# Patient Record
Sex: FEMALE | Race: WHITE | NOT HISPANIC OR LATINO | Employment: OTHER | URBAN - METROPOLITAN AREA
[De-identification: names, ages, dates, MRNs, and addresses within clinical notes are randomized per-mention and may not be internally consistent; named-entity substitution may affect disease eponyms.]

---

## 2017-01-03 ENCOUNTER — APPOINTMENT (OUTPATIENT)
Dept: PHYSICAL THERAPY | Facility: CLINIC | Age: 82
End: 2017-01-03
Payer: COMMERCIAL

## 2017-01-03 PROCEDURE — 97110 THERAPEUTIC EXERCISES: CPT

## 2017-01-03 PROCEDURE — 97112 NEUROMUSCULAR REEDUCATION: CPT

## 2017-01-04 ENCOUNTER — APPOINTMENT (EMERGENCY)
Dept: CT IMAGING | Facility: HOSPITAL | Age: 82
End: 2017-01-04
Payer: COMMERCIAL

## 2017-01-04 ENCOUNTER — HOSPITAL ENCOUNTER (EMERGENCY)
Facility: HOSPITAL | Age: 82
Discharge: HOME | End: 2017-01-04
Attending: EMERGENCY MEDICINE
Payer: COMMERCIAL

## 2017-01-04 ENCOUNTER — APPOINTMENT (EMERGENCY)
Dept: RADIOLOGY | Facility: HOSPITAL | Age: 82
End: 2017-01-04
Payer: COMMERCIAL

## 2017-01-04 ENCOUNTER — APPOINTMENT (OUTPATIENT)
Dept: PHYSICAL THERAPY | Facility: CLINIC | Age: 82
End: 2017-01-04
Payer: COMMERCIAL

## 2017-01-04 VITALS
TEMPERATURE: 98.4 F | OXYGEN SATURATION: 95 % | RESPIRATION RATE: 18 BRPM | DIASTOLIC BLOOD PRESSURE: 74 MMHG | SYSTOLIC BLOOD PRESSURE: 141 MMHG | HEART RATE: 59 BPM

## 2017-01-04 DIAGNOSIS — S50.01XA CONTUSION OF RIGHT ELBOW, INITIAL ENCOUNTER: ICD-10-CM
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
## 2017-01-04 DIAGNOSIS — S81.811A NONINFECTED SKIN TEAR OF LOWER EXTREMITY, RIGHT, INITIAL ENCOUNTER: ICD-10-CM

## 2017-01-04 DIAGNOSIS — S00.33XA CONTUSION OF NOSE, INITIAL ENCOUNTER: ICD-10-CM

## 2017-01-04 PROCEDURE — 73564 X-RAY EXAM KNEE 4 OR MORE: CPT

## 2017-01-04 PROCEDURE — 90715 TDAP VACCINE 7 YRS/> IM: CPT | Performed by: PHYSICIAN ASSISTANT

## 2017-01-04 PROCEDURE — 70160 X-RAY EXAM OF NASAL BONES: CPT

## 2017-01-04 PROCEDURE — 70450 CT HEAD/BRAIN W/O DYE: CPT

## 2017-01-04 PROCEDURE — 99284 EMERGENCY DEPT VISIT MOD MDM: CPT

## 2017-01-04 PROCEDURE — 73080 X-RAY EXAM OF ELBOW: CPT

## 2017-01-04 PROCEDURE — 90471 IMMUNIZATION ADMIN: CPT

## 2017-01-04 RX ADMIN — TETANUS TOXOID, REDUCED DIPHTHERIA TOXOID AND ACELLULAR PERTUSSIS VACCINE, ADSORBED 0.5 ML: 5; 2.5; 8; 8; 2.5 SUSPENSION INTRAMUSCULAR at 16:08

## 2017-01-06 ENCOUNTER — APPOINTMENT (OUTPATIENT)
Dept: PHYSICAL THERAPY | Facility: CLINIC | Age: 82
End: 2017-01-06
Payer: COMMERCIAL

## 2017-01-09 ENCOUNTER — OFFICE VISIT (OUTPATIENT)
Dept: URGENT CARE | Facility: MEDICAL CENTER | Age: 82
End: 2017-01-09
Payer: COMMERCIAL

## 2017-01-09 PROCEDURE — G0463 HOSPITAL OUTPT CLINIC VISIT: HCPCS

## 2017-01-09 PROCEDURE — 99213 OFFICE O/P EST LOW 20 MIN: CPT

## 2017-01-10 ENCOUNTER — APPOINTMENT (OUTPATIENT)
Dept: PHYSICAL THERAPY | Facility: CLINIC | Age: 82
End: 2017-01-10
Payer: COMMERCIAL

## 2017-01-10 ENCOUNTER — LAB REQUISITION (OUTPATIENT)
Dept: LAB | Facility: HOSPITAL | Age: 82
End: 2017-01-10
Payer: COMMERCIAL

## 2017-01-10 DIAGNOSIS — L03.90 CELLULITIS: ICD-10-CM

## 2017-01-10 PROCEDURE — 87147 CULTURE TYPE IMMUNOLOGIC: CPT | Performed by: EMERGENCY MEDICINE

## 2017-01-10 PROCEDURE — 87205 SMEAR GRAM STAIN: CPT | Performed by: EMERGENCY MEDICINE

## 2017-01-10 PROCEDURE — 87186 SC STD MICRODIL/AGAR DIL: CPT | Performed by: EMERGENCY MEDICINE

## 2017-01-10 PROCEDURE — 87070 CULTURE OTHR SPECIMN AEROBIC: CPT | Performed by: EMERGENCY MEDICINE

## 2017-01-11 ENCOUNTER — APPOINTMENT (OUTPATIENT)
Dept: PHYSICAL THERAPY | Facility: CLINIC | Age: 82
End: 2017-01-11
Payer: COMMERCIAL

## 2017-01-13 ENCOUNTER — APPOINTMENT (OUTPATIENT)
Dept: PHYSICAL THERAPY | Facility: CLINIC | Age: 82
End: 2017-01-13
Payer: COMMERCIAL

## 2017-01-13 LAB
BACTERIA WND AEROBE CULT: NORMAL
BACTERIA WND AEROBE CULT: NORMAL
GRAM STN SPEC: NORMAL
GRAM STN SPEC: NORMAL

## 2017-01-17 ENCOUNTER — APPOINTMENT (OUTPATIENT)
Dept: PHYSICAL THERAPY | Facility: CLINIC | Age: 82
End: 2017-01-17
Payer: COMMERCIAL

## 2017-01-17 PROCEDURE — G8990 OTHER PT/OT CURRENT STATUS: HCPCS

## 2017-01-17 PROCEDURE — G8991 OTHER PT/OT GOAL STATUS: HCPCS

## 2017-01-17 PROCEDURE — 97164 PT RE-EVAL EST PLAN CARE: CPT

## 2017-01-18 ENCOUNTER — APPOINTMENT (OUTPATIENT)
Dept: PHYSICAL THERAPY | Facility: CLINIC | Age: 82
End: 2017-01-18
Payer: COMMERCIAL

## 2017-01-18 PROCEDURE — 97110 THERAPEUTIC EXERCISES: CPT

## 2017-01-18 PROCEDURE — 97112 NEUROMUSCULAR REEDUCATION: CPT

## 2017-01-20 ENCOUNTER — APPOINTMENT (OUTPATIENT)
Dept: PHYSICAL THERAPY | Facility: CLINIC | Age: 82
End: 2017-01-20
Payer: COMMERCIAL

## 2017-01-20 PROCEDURE — 97110 THERAPEUTIC EXERCISES: CPT

## 2017-01-20 PROCEDURE — 97112 NEUROMUSCULAR REEDUCATION: CPT

## 2017-01-21 ENCOUNTER — HOSPITAL ENCOUNTER (EMERGENCY)
Facility: HOSPITAL | Age: 82
Discharge: HOME | End: 2017-01-21
Attending: EMERGENCY MEDICINE | Admitting: EMERGENCY MEDICINE
Payer: COMMERCIAL

## 2017-01-21 ENCOUNTER — APPOINTMENT (EMERGENCY)
Dept: RADIOLOGY | Facility: HOSPITAL | Age: 82
End: 2017-01-21
Payer: COMMERCIAL

## 2017-01-21 VITALS
SYSTOLIC BLOOD PRESSURE: 149 MMHG | HEART RATE: 74 BPM | WEIGHT: 134.26 LBS | BODY MASS INDEX: 29.05 KG/M2 | RESPIRATION RATE: 18 BRPM | TEMPERATURE: 98.8 F | OXYGEN SATURATION: 95 % | DIASTOLIC BLOOD PRESSURE: 82 MMHG

## 2017-01-21 DIAGNOSIS — S81.801A OPEN WOUND OF RIGHT LOWER LEG, INITIAL ENCOUNTER: Primary | ICD-10-CM

## 2017-01-21 PROCEDURE — 99283 EMERGENCY DEPT VISIT LOW MDM: CPT

## 2017-01-21 PROCEDURE — 73590 X-RAY EXAM OF LOWER LEG: CPT

## 2017-01-24 ENCOUNTER — APPOINTMENT (OUTPATIENT)
Dept: PHYSICAL THERAPY | Facility: CLINIC | Age: 82
End: 2017-01-24
Payer: COMMERCIAL

## 2017-01-24 PROCEDURE — 97112 NEUROMUSCULAR REEDUCATION: CPT

## 2017-01-24 PROCEDURE — 97110 THERAPEUTIC EXERCISES: CPT

## 2017-01-25 ENCOUNTER — APPOINTMENT (OUTPATIENT)
Dept: PHYSICAL THERAPY | Facility: CLINIC | Age: 82
End: 2017-01-25
Payer: COMMERCIAL

## 2017-01-25 PROCEDURE — 97112 NEUROMUSCULAR REEDUCATION: CPT

## 2017-01-25 PROCEDURE — 97110 THERAPEUTIC EXERCISES: CPT

## 2017-01-27 ENCOUNTER — APPOINTMENT (OUTPATIENT)
Dept: PHYSICAL THERAPY | Facility: CLINIC | Age: 82
End: 2017-01-27
Payer: COMMERCIAL

## 2017-01-27 PROCEDURE — 97110 THERAPEUTIC EXERCISES: CPT

## 2017-01-27 PROCEDURE — 97140 MANUAL THERAPY 1/> REGIONS: CPT

## 2017-01-27 PROCEDURE — 97112 NEUROMUSCULAR REEDUCATION: CPT

## 2017-01-31 ENCOUNTER — APPOINTMENT (OUTPATIENT)
Dept: PHYSICAL THERAPY | Facility: CLINIC | Age: 82
End: 2017-01-31
Payer: COMMERCIAL

## 2017-01-31 PROCEDURE — 97112 NEUROMUSCULAR REEDUCATION: CPT

## 2017-02-01 ENCOUNTER — APPOINTMENT (OUTPATIENT)
Dept: PHYSICAL THERAPY | Facility: CLINIC | Age: 82
End: 2017-02-01
Payer: COMMERCIAL

## 2017-02-01 PROCEDURE — 97110 THERAPEUTIC EXERCISES: CPT

## 2017-02-01 PROCEDURE — 97112 NEUROMUSCULAR REEDUCATION: CPT

## 2017-02-03 ENCOUNTER — APPOINTMENT (OUTPATIENT)
Dept: PHYSICAL THERAPY | Facility: CLINIC | Age: 82
End: 2017-02-03
Payer: COMMERCIAL

## 2017-02-07 ENCOUNTER — APPOINTMENT (OUTPATIENT)
Dept: PHYSICAL THERAPY | Facility: CLINIC | Age: 82
End: 2017-02-07
Payer: COMMERCIAL

## 2017-02-07 PROCEDURE — 97110 THERAPEUTIC EXERCISES: CPT

## 2017-02-07 PROCEDURE — 97112 NEUROMUSCULAR REEDUCATION: CPT

## 2017-02-07 PROCEDURE — 97140 MANUAL THERAPY 1/> REGIONS: CPT

## 2017-02-08 ENCOUNTER — APPOINTMENT (OUTPATIENT)
Dept: PHYSICAL THERAPY | Facility: CLINIC | Age: 82
End: 2017-02-08
Payer: COMMERCIAL

## 2017-02-08 PROCEDURE — 97140 MANUAL THERAPY 1/> REGIONS: CPT

## 2017-02-08 PROCEDURE — 97110 THERAPEUTIC EXERCISES: CPT

## 2017-02-08 PROCEDURE — 97112 NEUROMUSCULAR REEDUCATION: CPT

## 2017-02-10 ENCOUNTER — APPOINTMENT (OUTPATIENT)
Dept: PHYSICAL THERAPY | Facility: CLINIC | Age: 82
End: 2017-02-10
Payer: COMMERCIAL

## 2017-02-10 PROCEDURE — 97112 NEUROMUSCULAR REEDUCATION: CPT

## 2017-02-10 PROCEDURE — 97110 THERAPEUTIC EXERCISES: CPT

## 2017-02-14 ENCOUNTER — APPOINTMENT (OUTPATIENT)
Dept: PHYSICAL THERAPY | Facility: CLINIC | Age: 82
End: 2017-02-14
Payer: COMMERCIAL

## 2017-02-14 PROCEDURE — 97112 NEUROMUSCULAR REEDUCATION: CPT

## 2017-02-14 PROCEDURE — 97140 MANUAL THERAPY 1/> REGIONS: CPT

## 2017-02-14 PROCEDURE — 97110 THERAPEUTIC EXERCISES: CPT

## 2017-02-15 ENCOUNTER — APPOINTMENT (OUTPATIENT)
Dept: PHYSICAL THERAPY | Facility: CLINIC | Age: 82
End: 2017-02-15
Payer: COMMERCIAL

## 2017-02-17 ENCOUNTER — APPOINTMENT (OUTPATIENT)
Dept: PHYSICAL THERAPY | Facility: CLINIC | Age: 82
End: 2017-02-17
Payer: COMMERCIAL

## 2017-02-17 PROCEDURE — 97140 MANUAL THERAPY 1/> REGIONS: CPT

## 2017-02-17 PROCEDURE — 97014 ELECTRIC STIMULATION THERAPY: CPT

## 2017-02-17 PROCEDURE — 97110 THERAPEUTIC EXERCISES: CPT

## 2017-02-17 PROCEDURE — 97112 NEUROMUSCULAR REEDUCATION: CPT

## 2017-02-21 ENCOUNTER — APPOINTMENT (OUTPATIENT)
Dept: PHYSICAL THERAPY | Facility: CLINIC | Age: 82
End: 2017-02-21
Payer: COMMERCIAL

## 2017-02-21 PROCEDURE — 97112 NEUROMUSCULAR REEDUCATION: CPT

## 2017-02-21 PROCEDURE — 97110 THERAPEUTIC EXERCISES: CPT

## 2017-02-21 PROCEDURE — 97140 MANUAL THERAPY 1/> REGIONS: CPT

## 2017-02-21 PROCEDURE — 97014 ELECTRIC STIMULATION THERAPY: CPT

## 2017-02-22 ENCOUNTER — APPOINTMENT (OUTPATIENT)
Dept: PHYSICAL THERAPY | Facility: CLINIC | Age: 82
End: 2017-02-22
Payer: COMMERCIAL

## 2017-02-22 PROCEDURE — 97140 MANUAL THERAPY 1/> REGIONS: CPT

## 2017-02-22 PROCEDURE — 97110 THERAPEUTIC EXERCISES: CPT

## 2017-02-22 PROCEDURE — 97112 NEUROMUSCULAR REEDUCATION: CPT

## 2017-02-24 ENCOUNTER — APPOINTMENT (OUTPATIENT)
Dept: PHYSICAL THERAPY | Facility: CLINIC | Age: 82
End: 2017-02-24
Payer: COMMERCIAL

## 2017-02-28 ENCOUNTER — APPOINTMENT (OUTPATIENT)
Dept: PHYSICAL THERAPY | Facility: CLINIC | Age: 82
End: 2017-02-28
Payer: COMMERCIAL

## 2017-02-28 ENCOUNTER — APPOINTMENT (OUTPATIENT)
Dept: RADIATION ONCOLOGY | Facility: HOSPITAL | Age: 82
End: 2017-02-28
Attending: RADIOLOGY
Payer: COMMERCIAL

## 2017-02-28 ENCOUNTER — GENERIC CONVERSION - ENCOUNTER (OUTPATIENT)
Dept: OTHER | Facility: OTHER | Age: 82
End: 2017-02-28

## 2017-02-28 PROCEDURE — 99213 OFFICE O/P EST LOW 20 MIN: CPT | Performed by: RADIOLOGY

## 2017-03-01 ENCOUNTER — APPOINTMENT (OUTPATIENT)
Dept: PHYSICAL THERAPY | Facility: CLINIC | Age: 82
End: 2017-03-01
Payer: COMMERCIAL

## 2017-03-01 PROCEDURE — 97110 THERAPEUTIC EXERCISES: CPT

## 2017-03-01 PROCEDURE — 97140 MANUAL THERAPY 1/> REGIONS: CPT

## 2017-03-03 ENCOUNTER — APPOINTMENT (OUTPATIENT)
Dept: PHYSICAL THERAPY | Facility: CLINIC | Age: 82
End: 2017-03-03
Payer: COMMERCIAL

## 2017-03-07 ENCOUNTER — APPOINTMENT (OUTPATIENT)
Dept: PHYSICAL THERAPY | Facility: CLINIC | Age: 82
End: 2017-03-07
Payer: COMMERCIAL

## 2017-03-07 PROCEDURE — G8991 OTHER PT/OT GOAL STATUS: HCPCS

## 2017-03-07 PROCEDURE — 97112 NEUROMUSCULAR REEDUCATION: CPT

## 2017-03-07 PROCEDURE — 97110 THERAPEUTIC EXERCISES: CPT

## 2017-03-07 PROCEDURE — G8990 OTHER PT/OT CURRENT STATUS: HCPCS

## 2017-03-08 ENCOUNTER — APPOINTMENT (OUTPATIENT)
Dept: PHYSICAL THERAPY | Facility: CLINIC | Age: 82
End: 2017-03-08
Payer: COMMERCIAL

## 2017-03-08 PROCEDURE — 97110 THERAPEUTIC EXERCISES: CPT

## 2017-03-08 PROCEDURE — 97112 NEUROMUSCULAR REEDUCATION: CPT

## 2017-03-10 ENCOUNTER — APPOINTMENT (OUTPATIENT)
Dept: PHYSICAL THERAPY | Facility: CLINIC | Age: 82
End: 2017-03-10
Payer: COMMERCIAL

## 2017-03-10 PROCEDURE — 97110 THERAPEUTIC EXERCISES: CPT

## 2017-03-10 PROCEDURE — 97112 NEUROMUSCULAR REEDUCATION: CPT

## 2017-03-14 ENCOUNTER — APPOINTMENT (OUTPATIENT)
Dept: PHYSICAL THERAPY | Facility: CLINIC | Age: 82
End: 2017-03-14
Payer: COMMERCIAL

## 2017-03-15 ENCOUNTER — APPOINTMENT (OUTPATIENT)
Dept: PHYSICAL THERAPY | Facility: CLINIC | Age: 82
End: 2017-03-15
Payer: COMMERCIAL

## 2017-03-17 ENCOUNTER — APPOINTMENT (OUTPATIENT)
Dept: PHYSICAL THERAPY | Facility: CLINIC | Age: 82
End: 2017-03-17
Payer: COMMERCIAL

## 2017-03-21 ENCOUNTER — APPOINTMENT (OUTPATIENT)
Dept: PHYSICAL THERAPY | Facility: CLINIC | Age: 82
End: 2017-03-21
Payer: COMMERCIAL

## 2017-03-21 PROCEDURE — 97014 ELECTRIC STIMULATION THERAPY: CPT

## 2017-03-21 PROCEDURE — 97110 THERAPEUTIC EXERCISES: CPT

## 2017-03-22 ENCOUNTER — APPOINTMENT (OUTPATIENT)
Dept: PHYSICAL THERAPY | Facility: CLINIC | Age: 82
End: 2017-03-22
Payer: COMMERCIAL

## 2017-03-24 ENCOUNTER — APPOINTMENT (OUTPATIENT)
Dept: PHYSICAL THERAPY | Facility: CLINIC | Age: 82
End: 2017-03-24
Payer: COMMERCIAL

## 2017-03-28 ENCOUNTER — APPOINTMENT (OUTPATIENT)
Dept: PHYSICAL THERAPY | Facility: CLINIC | Age: 82
End: 2017-03-28
Payer: COMMERCIAL

## 2017-03-29 ENCOUNTER — APPOINTMENT (OUTPATIENT)
Dept: PHYSICAL THERAPY | Facility: CLINIC | Age: 82
End: 2017-03-29
Payer: COMMERCIAL

## 2017-03-31 ENCOUNTER — APPOINTMENT (OUTPATIENT)
Dept: PHYSICAL THERAPY | Facility: CLINIC | Age: 82
End: 2017-03-31
Payer: COMMERCIAL

## 2017-04-02 ENCOUNTER — APPOINTMENT (EMERGENCY)
Dept: RADIOLOGY | Facility: HOSPITAL | Age: 82
DRG: 378 | End: 2017-04-02
Payer: COMMERCIAL

## 2017-04-02 ENCOUNTER — HOSPITAL ENCOUNTER (INPATIENT)
Facility: HOSPITAL | Age: 82
LOS: 6 days | Discharge: NURSING, REHAB OR LONG-TERM CARE | DRG: 378 | End: 2017-04-08
Attending: EMERGENCY MEDICINE | Admitting: COLON & RECTAL SURGERY
Payer: COMMERCIAL

## 2017-04-02 DIAGNOSIS — K92.2 LOWER GI BLEED: ICD-10-CM

## 2017-04-02 DIAGNOSIS — E87.2 LACTIC ACIDOSIS: ICD-10-CM

## 2017-04-02 DIAGNOSIS — R10.9 ABDOMINAL PAIN: Primary | ICD-10-CM

## 2017-04-02 PROBLEM — N17.9 ACUTE KIDNEY INJURY (HCC): Status: ACTIVE | Noted: 2017-04-02

## 2017-04-02 LAB
ABO GROUP BLD: NORMAL
ALBUMIN SERPL BCP-MCNC: 2.8 G/DL (ref 3.5–5)
ALP SERPL-CCNC: 85 U/L (ref 46–116)
ALT SERPL W P-5'-P-CCNC: 14 U/L (ref 12–78)
ANION GAP BLD CALC-SCNC: 19 MMOL/L (ref 4–13)
ANION GAP SERPL CALCULATED.3IONS-SCNC: 14 MMOL/L (ref 4–13)
APTT PPP: 28 SECONDS (ref 24–36)
AST SERPL W P-5'-P-CCNC: 13 U/L (ref 5–45)
ATRIAL RATE: 300 BPM
BASOPHILS # BLD AUTO: 0.03 THOUSANDS/ΜL (ref 0–0.1)
BASOPHILS NFR BLD AUTO: 0 % (ref 0–1)
BILIRUB SERPL-MCNC: 0.41 MG/DL (ref 0.2–1)
BLD GP AB SCN SERPL QL: NEGATIVE
BUN BLD-MCNC: 47 MG/DL (ref 5–25)
BUN SERPL-MCNC: 43 MG/DL (ref 5–25)
CA-I BLD-SCNC: 1.14 MMOL/L (ref 1.12–1.32)
CALCIUM SERPL-MCNC: 8.8 MG/DL (ref 8.3–10.1)
CHLORIDE BLD-SCNC: 113 MMOL/L (ref 100–108)
CHLORIDE SERPL-SCNC: 112 MMOL/L (ref 100–108)
CO2 SERPL-SCNC: 19 MMOL/L (ref 21–32)
CREAT BLD-MCNC: 1.5 MG/DL (ref 0.6–1.3)
CREAT SERPL-MCNC: 1.86 MG/DL (ref 0.6–1.3)
EOSINOPHIL # BLD AUTO: 0.06 THOUSAND/ΜL (ref 0–0.61)
EOSINOPHIL NFR BLD AUTO: 0 % (ref 0–6)
ERYTHROCYTE [DISTWIDTH] IN BLOOD BY AUTOMATED COUNT: 13.5 % (ref 11.6–15.1)
GFR SERPL CREATININE-BSD FRML MDRD: 25.3 ML/MIN/1.73SQ M
GFR SERPL CREATININE-BSD FRML MDRD: 32.4 ML/MIN/1.73SQ M
GLUCOSE SERPL-MCNC: 145 MG/DL (ref 65–140)
GLUCOSE SERPL-MCNC: 150 MG/DL (ref 65–140)
HCT VFR BLD AUTO: 36.1 % (ref 34.8–46.1)
HCT VFR BLD CALC: 36 % (ref 34.8–46.1)
HGB BLD-MCNC: 11.8 G/DL (ref 11.5–15.4)
HGB BLDA-MCNC: 12.2 G/DL (ref 11.5–15.4)
INR PPP: 1.17 (ref 0.86–1.16)
LACTATE SERPL-SCNC: 6.1 MMOL/L (ref 0.5–2)
LYMPHOCYTES # BLD AUTO: 2.34 THOUSANDS/ΜL (ref 0.6–4.47)
LYMPHOCYTES NFR BLD AUTO: 11 % (ref 14–44)
MCH RBC QN AUTO: 31.6 PG (ref 26.8–34.3)
MCHC RBC AUTO-ENTMCNC: 32.7 G/DL (ref 31.4–37.4)
MCV RBC AUTO: 97 FL (ref 82–98)
MONOCYTES # BLD AUTO: 0.8 THOUSAND/ΜL (ref 0.17–1.22)
MONOCYTES NFR BLD AUTO: 4 % (ref 4–12)
NEUTROPHILS # BLD AUTO: 17.3 THOUSANDS/ΜL (ref 1.85–7.62)
NEUTS SEG NFR BLD AUTO: 85 % (ref 43–75)
NRBC BLD AUTO-RTO: 0 /100 WBCS
PCO2 BLD: 18 MMOL/L (ref 21–32)
PLATELET # BLD AUTO: 256 THOUSANDS/UL (ref 149–390)
PMV BLD AUTO: 10.9 FL (ref 8.9–12.7)
POTASSIUM BLD-SCNC: 4.4 MMOL/L (ref 3.5–5.3)
POTASSIUM SERPL-SCNC: 4.3 MMOL/L (ref 3.5–5.3)
PROT SERPL-MCNC: 7.5 G/DL (ref 6.4–8.2)
PROTHROMBIN TIME: 15 SECONDS (ref 12–14.3)
QRS AXIS: -68 DEGREES
QRSD INTERVAL: 80 MS
QT INTERVAL: 344 MS
QTC INTERVAL: 401 MS
RBC # BLD AUTO: 3.73 MILLION/UL (ref 3.81–5.12)
RH BLD: POSITIVE
SODIUM BLD-SCNC: 145 MMOL/L (ref 136–145)
SODIUM SERPL-SCNC: 145 MMOL/L (ref 136–145)
SPECIMEN SOURCE: ABNORMAL
SPECIMEN SOURCE: NORMAL
T WAVE AXIS: -9 DEGREES
TROPONIN I BLD-MCNC: 0.01 NG/ML (ref 0–0.08)
VENTRICULAR RATE: 82 BPM
WBC # BLD AUTO: 20.8 THOUSAND/UL (ref 4.31–10.16)

## 2017-04-02 PROCEDURE — 86901 BLOOD TYPING SEROLOGIC RH(D): CPT | Performed by: EMERGENCY MEDICINE

## 2017-04-02 PROCEDURE — 85014 HEMATOCRIT: CPT

## 2017-04-02 PROCEDURE — 74176 CT ABD & PELVIS W/O CONTRAST: CPT

## 2017-04-02 PROCEDURE — 93005 ELECTROCARDIOGRAM TRACING: CPT | Performed by: EMERGENCY MEDICINE

## 2017-04-02 PROCEDURE — 86850 RBC ANTIBODY SCREEN: CPT | Performed by: EMERGENCY MEDICINE

## 2017-04-02 PROCEDURE — 85025 COMPLETE CBC W/AUTO DIFF WBC: CPT | Performed by: EMERGENCY MEDICINE

## 2017-04-02 PROCEDURE — 85730 THROMBOPLASTIN TIME PARTIAL: CPT | Performed by: EMERGENCY MEDICINE

## 2017-04-02 PROCEDURE — 83605 ASSAY OF LACTIC ACID: CPT | Performed by: EMERGENCY MEDICINE

## 2017-04-02 PROCEDURE — 80053 COMPREHEN METABOLIC PANEL: CPT | Performed by: EMERGENCY MEDICINE

## 2017-04-02 PROCEDURE — 36415 COLL VENOUS BLD VENIPUNCTURE: CPT | Performed by: EMERGENCY MEDICINE

## 2017-04-02 PROCEDURE — 84484 ASSAY OF TROPONIN QUANT: CPT

## 2017-04-02 PROCEDURE — 80047 BASIC METABLC PNL IONIZED CA: CPT

## 2017-04-02 PROCEDURE — 93005 ELECTROCARDIOGRAM TRACING: CPT

## 2017-04-02 PROCEDURE — 86900 BLOOD TYPING SEROLOGIC ABO: CPT | Performed by: EMERGENCY MEDICINE

## 2017-04-02 PROCEDURE — 85610 PROTHROMBIN TIME: CPT | Performed by: EMERGENCY MEDICINE

## 2017-04-02 RX ORDER — ESCITALOPRAM OXALATE 10 MG/1
5 TABLET ORAL DAILY
Status: DISCONTINUED | OUTPATIENT
Start: 2017-04-03 | End: 2017-04-08 | Stop reason: HOSPADM

## 2017-04-02 RX ORDER — ONDANSETRON 2 MG/ML
4 INJECTION INTRAMUSCULAR; INTRAVENOUS EVERY 6 HOURS PRN
Status: DISCONTINUED | OUTPATIENT
Start: 2017-04-02 | End: 2017-04-08 | Stop reason: HOSPADM

## 2017-04-02 RX ORDER — TRAMADOL HYDROCHLORIDE 50 MG/1
50 TABLET ORAL EVERY 6 HOURS PRN
COMMUNITY
End: 2020-07-27 | Stop reason: ALTCHOICE

## 2017-04-02 RX ORDER — ALLOPURINOL 100 MG/1
100 TABLET ORAL DAILY
Status: DISCONTINUED | OUTPATIENT
Start: 2017-04-03 | End: 2017-04-08 | Stop reason: HOSPADM

## 2017-04-02 RX ORDER — POLYETHYLENE GLYCOL 3350 17 G/17G
238 POWDER, FOR SOLUTION ORAL ONCE
Status: COMPLETED | OUTPATIENT
Start: 2017-04-02 | End: 2017-04-03

## 2017-04-02 RX ORDER — SODIUM CHLORIDE 9 MG/ML
125 INJECTION, SOLUTION INTRAVENOUS CONTINUOUS
Status: DISCONTINUED | OUTPATIENT
Start: 2017-04-02 | End: 2017-04-03

## 2017-04-03 ENCOUNTER — ANESTHESIA (INPATIENT)
Dept: GASTROENTEROLOGY | Facility: HOSPITAL | Age: 82
DRG: 378 | End: 2017-04-03
Payer: COMMERCIAL

## 2017-04-03 ENCOUNTER — ANESTHESIA EVENT (INPATIENT)
Dept: GASTROENTEROLOGY | Facility: HOSPITAL | Age: 82
DRG: 378 | End: 2017-04-03
Payer: COMMERCIAL

## 2017-04-03 LAB
ANION GAP SERPL CALCULATED.3IONS-SCNC: 10 MMOL/L (ref 4–13)
BUN SERPL-MCNC: 46 MG/DL (ref 5–25)
CALCIUM SERPL-MCNC: 8.3 MG/DL (ref 8.3–10.1)
CHLORIDE SERPL-SCNC: 117 MMOL/L (ref 100–108)
CO2 SERPL-SCNC: 19 MMOL/L (ref 21–32)
CREAT SERPL-MCNC: 1.51 MG/DL (ref 0.6–1.3)
GFR SERPL CREATININE-BSD FRML MDRD: 32.2 ML/MIN/1.73SQ M
GLUCOSE SERPL-MCNC: 95 MG/DL (ref 65–140)
HCT VFR BLD AUTO: 30 % (ref 34.8–46.1)
HCT VFR BLD AUTO: 30.7 % (ref 34.8–46.1)
HCT VFR BLD AUTO: 31.3 % (ref 34.8–46.1)
HGB BLD-MCNC: 10.1 G/DL (ref 11.5–15.4)
HGB BLD-MCNC: 9.8 G/DL (ref 11.5–15.4)
HGB BLD-MCNC: 9.9 G/DL (ref 11.5–15.4)
LACTATE SERPL-SCNC: 0.9 MMOL/L (ref 0.5–2)
POTASSIUM SERPL-SCNC: 4.3 MMOL/L (ref 3.5–5.3)
SODIUM SERPL-SCNC: 146 MMOL/L (ref 136–145)

## 2017-04-03 PROCEDURE — 0DJD8ZZ INSPECTION OF LOWER INTESTINAL TRACT, VIA NATURAL OR ARTIFICIAL OPENING ENDOSCOPIC: ICD-10-PCS | Performed by: COLON & RECTAL SURGERY

## 2017-04-03 PROCEDURE — 97163 PT EVAL HIGH COMPLEX 45 MIN: CPT

## 2017-04-03 PROCEDURE — 85014 HEMATOCRIT: CPT | Performed by: SURGERY

## 2017-04-03 PROCEDURE — G8979 MOBILITY GOAL STATUS: HCPCS

## 2017-04-03 PROCEDURE — 80048 BASIC METABOLIC PNL TOTAL CA: CPT | Performed by: SURGERY

## 2017-04-03 PROCEDURE — 0DJ08ZZ INSPECTION OF UPPER INTESTINAL TRACT, VIA NATURAL OR ARTIFICIAL OPENING ENDOSCOPIC: ICD-10-PCS | Performed by: INTERNAL MEDICINE

## 2017-04-03 PROCEDURE — 97166 OT EVAL MOD COMPLEX 45 MIN: CPT

## 2017-04-03 PROCEDURE — G8978 MOBILITY CURRENT STATUS: HCPCS

## 2017-04-03 PROCEDURE — 83605 ASSAY OF LACTIC ACID: CPT | Performed by: SURGERY

## 2017-04-03 PROCEDURE — C9113 INJ PANTOPRAZOLE SODIUM, VIA: HCPCS | Performed by: NURSE PRACTITIONER

## 2017-04-03 PROCEDURE — G8988 SELF CARE GOAL STATUS: HCPCS

## 2017-04-03 PROCEDURE — 85018 HEMOGLOBIN: CPT | Performed by: SURGERY

## 2017-04-03 PROCEDURE — 99285 EMERGENCY DEPT VISIT HI MDM: CPT

## 2017-04-03 PROCEDURE — G8987 SELF CARE CURRENT STATUS: HCPCS

## 2017-04-03 RX ORDER — PROPOFOL 10 MG/ML
INJECTION, EMULSION INTRAVENOUS AS NEEDED
Status: DISCONTINUED | OUTPATIENT
Start: 2017-04-03 | End: 2017-04-03 | Stop reason: SURG

## 2017-04-03 RX ORDER — SODIUM CHLORIDE, SODIUM GLUCONATE, SODIUM ACETATE, POTASSIUM CHLORIDE, MAGNESIUM CHLORIDE, SODIUM PHOSPHATE, DIBASIC, AND POTASSIUM PHOSPHATE .53; .5; .37; .037; .03; .012; .00082 G/100ML; G/100ML; G/100ML; G/100ML; G/100ML; G/100ML; G/100ML
100 INJECTION, SOLUTION INTRAVENOUS CONTINUOUS
Status: DISCONTINUED | OUTPATIENT
Start: 2017-04-03 | End: 2017-04-04

## 2017-04-03 RX ORDER — OXYCODONE HYDROCHLORIDE 10 MG/1
10 TABLET ORAL EVERY 4 HOURS PRN
Status: DISCONTINUED | OUTPATIENT
Start: 2017-04-03 | End: 2017-04-08 | Stop reason: HOSPADM

## 2017-04-03 RX ORDER — SODIUM CHLORIDE 9 MG/ML
INJECTION, SOLUTION INTRAVENOUS CONTINUOUS PRN
Status: DISCONTINUED | OUTPATIENT
Start: 2017-04-03 | End: 2017-04-03 | Stop reason: SURG

## 2017-04-03 RX ORDER — OXYCODONE HYDROCHLORIDE 5 MG/1
5 TABLET ORAL EVERY 4 HOURS PRN
Status: DISCONTINUED | OUTPATIENT
Start: 2017-04-03 | End: 2017-04-08 | Stop reason: HOSPADM

## 2017-04-03 RX ORDER — DOCUSATE SODIUM 100 MG/1
100 CAPSULE, LIQUID FILLED ORAL
COMMUNITY
End: 2020-07-27 | Stop reason: ALTCHOICE

## 2017-04-03 RX ORDER — PANTOPRAZOLE SODIUM 40 MG/1
40 INJECTION, POWDER, FOR SOLUTION INTRAVENOUS EVERY 12 HOURS SCHEDULED
Status: DISCONTINUED | OUTPATIENT
Start: 2017-04-03 | End: 2017-04-08 | Stop reason: HOSPADM

## 2017-04-03 RX ORDER — PANTOPRAZOLE SODIUM 40 MG/1
40 INJECTION, POWDER, FOR SOLUTION INTRAVENOUS
Status: DISCONTINUED | OUTPATIENT
Start: 2017-04-03 | End: 2017-04-03

## 2017-04-03 RX ADMIN — PANTOPRAZOLE SODIUM 40 MG: 40 INJECTION, POWDER, FOR SOLUTION INTRAVENOUS at 22:02

## 2017-04-03 RX ADMIN — PROPOFOL 50 MG: 10 INJECTION, EMULSION INTRAVENOUS at 12:43

## 2017-04-03 RX ADMIN — PROPOFOL 30 MG: 10 INJECTION, EMULSION INTRAVENOUS at 13:12

## 2017-04-03 RX ADMIN — ALLOPURINOL 100 MG: 100 TABLET ORAL at 08:30

## 2017-04-03 RX ADMIN — SODIUM CHLORIDE 125 ML/HR: 0.9 INJECTION, SOLUTION INTRAVENOUS at 01:30

## 2017-04-03 RX ADMIN — ESCITALOPRAM OXALATE 5 MG: 10 TABLET ORAL at 08:30

## 2017-04-03 RX ADMIN — PROPOFOL 20 MG: 10 INJECTION, EMULSION INTRAVENOUS at 12:45

## 2017-04-03 RX ADMIN — PANTOPRAZOLE SODIUM 40 MG: 40 INJECTION, POWDER, FOR SOLUTION INTRAVENOUS at 15:00

## 2017-04-03 RX ADMIN — PROPOFOL 30 MG: 10 INJECTION, EMULSION INTRAVENOUS at 12:56

## 2017-04-03 RX ADMIN — PROPOFOL 30 MG: 10 INJECTION, EMULSION INTRAVENOUS at 12:47

## 2017-04-03 RX ADMIN — PROPOFOL 20 MG: 10 INJECTION, EMULSION INTRAVENOUS at 12:53

## 2017-04-03 RX ADMIN — PROPOFOL 20 MG: 10 INJECTION, EMULSION INTRAVENOUS at 12:51

## 2017-04-03 RX ADMIN — PROPOFOL 30 MG: 10 INJECTION, EMULSION INTRAVENOUS at 13:04

## 2017-04-03 RX ADMIN — POLYETHYLENE GLYCOL 3350 238 G: 17 POWDER, FOR SOLUTION ORAL at 01:05

## 2017-04-03 RX ADMIN — PROPOFOL 30 MG: 10 INJECTION, EMULSION INTRAVENOUS at 13:09

## 2017-04-03 RX ADMIN — SODIUM CHLORIDE, SODIUM GLUCONATE, SODIUM ACETATE, POTASSIUM CHLORIDE, MAGNESIUM CHLORIDE, SODIUM PHOSPHATE, DIBASIC, AND POTASSIUM PHOSPHATE 100 ML/HR: .53; .5; .37; .037; .03; .012; .00082 INJECTION, SOLUTION INTRAVENOUS at 15:00

## 2017-04-03 RX ADMIN — PROPOFOL 30 MG: 10 INJECTION, EMULSION INTRAVENOUS at 12:49

## 2017-04-03 RX ADMIN — SODIUM CHLORIDE: 0.9 INJECTION, SOLUTION INTRAVENOUS at 12:05

## 2017-04-04 ENCOUNTER — GENERIC CONVERSION - ENCOUNTER (OUTPATIENT)
Dept: OTHER | Facility: OTHER | Age: 82
End: 2017-04-04

## 2017-04-04 LAB
ANION GAP SERPL CALCULATED.3IONS-SCNC: 8 MMOL/L (ref 4–13)
BUN SERPL-MCNC: 23 MG/DL (ref 5–25)
CALCIUM SERPL-MCNC: 8.1 MG/DL (ref 8.3–10.1)
CHLORIDE SERPL-SCNC: 116 MMOL/L (ref 100–108)
CO2 SERPL-SCNC: 24 MMOL/L (ref 21–32)
CREAT SERPL-MCNC: 1.18 MG/DL (ref 0.6–1.3)
ERYTHROCYTE [DISTWIDTH] IN BLOOD BY AUTOMATED COUNT: 13.8 % (ref 11.6–15.1)
GFR SERPL CREATININE-BSD FRML MDRD: 42.7 ML/MIN/1.73SQ M
GLUCOSE SERPL-MCNC: 93 MG/DL (ref 65–140)
HCT VFR BLD AUTO: 26 % (ref 34.8–46.1)
HGB BLD-MCNC: 8.7 G/DL (ref 11.5–15.4)
MCH RBC QN AUTO: 32 PG (ref 26.8–34.3)
MCHC RBC AUTO-ENTMCNC: 33.5 G/DL (ref 31.4–37.4)
MCV RBC AUTO: 96 FL (ref 82–98)
PLATELET # BLD AUTO: 178 THOUSANDS/UL (ref 149–390)
PMV BLD AUTO: 10.8 FL (ref 8.9–12.7)
POTASSIUM SERPL-SCNC: 4 MMOL/L (ref 3.5–5.3)
RBC # BLD AUTO: 2.72 MILLION/UL (ref 3.81–5.12)
SODIUM SERPL-SCNC: 148 MMOL/L (ref 136–145)
WBC # BLD AUTO: 12.86 THOUSAND/UL (ref 4.31–10.16)

## 2017-04-04 PROCEDURE — C9113 INJ PANTOPRAZOLE SODIUM, VIA: HCPCS | Performed by: NURSE PRACTITIONER

## 2017-04-04 PROCEDURE — 80048 BASIC METABOLIC PNL TOTAL CA: CPT | Performed by: SURGERY

## 2017-04-04 PROCEDURE — 97530 THERAPEUTIC ACTIVITIES: CPT

## 2017-04-04 PROCEDURE — 85027 COMPLETE CBC AUTOMATED: CPT | Performed by: SURGERY

## 2017-04-04 PROCEDURE — 97116 GAIT TRAINING THERAPY: CPT

## 2017-04-04 PROCEDURE — 97110 THERAPEUTIC EXERCISES: CPT

## 2017-04-04 RX ADMIN — ALLOPURINOL 100 MG: 100 TABLET ORAL at 08:24

## 2017-04-04 RX ADMIN — PANTOPRAZOLE SODIUM 40 MG: 40 INJECTION, POWDER, FOR SOLUTION INTRAVENOUS at 21:18

## 2017-04-04 RX ADMIN — ESCITALOPRAM OXALATE 5 MG: 10 TABLET ORAL at 08:22

## 2017-04-04 RX ADMIN — PANTOPRAZOLE SODIUM 40 MG: 40 INJECTION, POWDER, FOR SOLUTION INTRAVENOUS at 08:22

## 2017-04-04 RX ADMIN — SODIUM CHLORIDE, SODIUM GLUCONATE, SODIUM ACETATE, POTASSIUM CHLORIDE, MAGNESIUM CHLORIDE, SODIUM PHOSPHATE, DIBASIC, AND POTASSIUM PHOSPHATE 100 ML/HR: .53; .5; .37; .037; .03; .012; .00082 INJECTION, SOLUTION INTRAVENOUS at 01:08

## 2017-04-05 LAB
ANION GAP SERPL CALCULATED.3IONS-SCNC: 6 MMOL/L (ref 4–13)
BASOPHILS # BLD AUTO: 0.03 THOUSANDS/ΜL (ref 0–0.1)
BASOPHILS NFR BLD AUTO: 0 % (ref 0–1)
BUN SERPL-MCNC: 20 MG/DL (ref 5–25)
CALCIUM SERPL-MCNC: 8.3 MG/DL (ref 8.3–10.1)
CHLORIDE SERPL-SCNC: 113 MMOL/L (ref 100–108)
CO2 SERPL-SCNC: 23 MMOL/L (ref 21–32)
CREAT SERPL-MCNC: 1.14 MG/DL (ref 0.6–1.3)
EOSINOPHIL # BLD AUTO: 0.16 THOUSAND/ΜL (ref 0–0.61)
EOSINOPHIL NFR BLD AUTO: 1 % (ref 0–6)
ERYTHROCYTE [DISTWIDTH] IN BLOOD BY AUTOMATED COUNT: 13.6 % (ref 11.6–15.1)
GFR SERPL CREATININE-BSD FRML MDRD: 44.5 ML/MIN/1.73SQ M
GLUCOSE SERPL-MCNC: 116 MG/DL (ref 65–140)
HCT VFR BLD AUTO: 21.3 % (ref 34.8–46.1)
HCT VFR BLD AUTO: 21.9 % (ref 34.8–46.1)
HCT VFR BLD AUTO: 23 % (ref 34.8–46.1)
HGB BLD-MCNC: 7.2 G/DL (ref 11.5–15.4)
HGB BLD-MCNC: 7.2 G/DL (ref 11.5–15.4)
HGB BLD-MCNC: 7.6 G/DL (ref 11.5–15.4)
LYMPHOCYTES # BLD AUTO: 2.93 THOUSANDS/ΜL (ref 0.6–4.47)
LYMPHOCYTES NFR BLD AUTO: 21 % (ref 14–44)
MCH RBC QN AUTO: 31.7 PG (ref 26.8–34.3)
MCHC RBC AUTO-ENTMCNC: 33 G/DL (ref 31.4–37.4)
MCV RBC AUTO: 96 FL (ref 82–98)
MONOCYTES # BLD AUTO: 1.16 THOUSAND/ΜL (ref 0.17–1.22)
MONOCYTES NFR BLD AUTO: 8 % (ref 4–12)
NEUTROPHILS # BLD AUTO: 9.46 THOUSANDS/ΜL (ref 1.85–7.62)
NEUTS SEG NFR BLD AUTO: 70 % (ref 43–75)
NRBC BLD AUTO-RTO: 0 /100 WBCS
PLATELET # BLD AUTO: 172 THOUSANDS/UL (ref 149–390)
PMV BLD AUTO: 10.7 FL (ref 8.9–12.7)
POTASSIUM SERPL-SCNC: 4.4 MMOL/L (ref 3.5–5.3)
RBC # BLD AUTO: 2.4 MILLION/UL (ref 3.81–5.12)
SODIUM SERPL-SCNC: 142 MMOL/L (ref 136–145)
WBC # BLD AUTO: 13.79 THOUSAND/UL (ref 4.31–10.16)

## 2017-04-05 PROCEDURE — 85018 HEMOGLOBIN: CPT | Performed by: SURGERY

## 2017-04-05 PROCEDURE — 80048 BASIC METABOLIC PNL TOTAL CA: CPT | Performed by: COLON & RECTAL SURGERY

## 2017-04-05 PROCEDURE — 85014 HEMATOCRIT: CPT | Performed by: SURGERY

## 2017-04-05 PROCEDURE — 85025 COMPLETE CBC W/AUTO DIFF WBC: CPT | Performed by: COLON & RECTAL SURGERY

## 2017-04-05 PROCEDURE — 97116 GAIT TRAINING THERAPY: CPT

## 2017-04-05 PROCEDURE — C9113 INJ PANTOPRAZOLE SODIUM, VIA: HCPCS | Performed by: NURSE PRACTITIONER

## 2017-04-05 RX ORDER — SODIUM CHLORIDE 9 MG/ML
40 INJECTION, SOLUTION INTRAVENOUS CONTINUOUS
Status: DISCONTINUED | OUTPATIENT
Start: 2017-04-05 | End: 2017-04-06

## 2017-04-05 RX ADMIN — PANTOPRAZOLE SODIUM 40 MG: 40 INJECTION, POWDER, FOR SOLUTION INTRAVENOUS at 08:53

## 2017-04-05 RX ADMIN — PANTOPRAZOLE SODIUM 40 MG: 40 INJECTION, POWDER, FOR SOLUTION INTRAVENOUS at 21:02

## 2017-04-05 RX ADMIN — SODIUM CHLORIDE 40 ML/HR: 0.9 INJECTION, SOLUTION INTRAVENOUS at 07:29

## 2017-04-06 ENCOUNTER — APPOINTMENT (INPATIENT)
Dept: OCCUPATIONAL THERAPY | Facility: HOSPITAL | Age: 82
DRG: 378 | End: 2017-04-06
Payer: COMMERCIAL

## 2017-04-06 LAB
ANION GAP SERPL CALCULATED.3IONS-SCNC: 8 MMOL/L (ref 4–13)
BUN SERPL-MCNC: 20 MG/DL (ref 5–25)
CALCIUM SERPL-MCNC: 8.9 MG/DL (ref 8.3–10.1)
CHLORIDE SERPL-SCNC: 110 MMOL/L (ref 100–108)
CO2 SERPL-SCNC: 24 MMOL/L (ref 21–32)
CREAT SERPL-MCNC: 1.21 MG/DL (ref 0.6–1.3)
ERYTHROCYTE [DISTWIDTH] IN BLOOD BY AUTOMATED COUNT: 13.8 % (ref 11.6–15.1)
GFR SERPL CREATININE-BSD FRML MDRD: 41.5 ML/MIN/1.73SQ M
GLUCOSE SERPL-MCNC: 87 MG/DL (ref 65–140)
HCT VFR BLD AUTO: 21.9 % (ref 34.8–46.1)
HGB BLD-MCNC: 7.2 G/DL (ref 11.5–15.4)
MCH RBC QN AUTO: 31.6 PG (ref 26.8–34.3)
MCHC RBC AUTO-ENTMCNC: 32.9 G/DL (ref 31.4–37.4)
MCV RBC AUTO: 96 FL (ref 82–98)
PLATELET # BLD AUTO: 174 THOUSANDS/UL (ref 149–390)
PMV BLD AUTO: 11.1 FL (ref 8.9–12.7)
POTASSIUM SERPL-SCNC: 4.3 MMOL/L (ref 3.5–5.3)
RBC # BLD AUTO: 2.28 MILLION/UL (ref 3.81–5.12)
SODIUM SERPL-SCNC: 142 MMOL/L (ref 136–145)
WBC # BLD AUTO: 11 THOUSAND/UL (ref 4.31–10.16)

## 2017-04-06 PROCEDURE — C9113 INJ PANTOPRAZOLE SODIUM, VIA: HCPCS | Performed by: NURSE PRACTITIONER

## 2017-04-06 PROCEDURE — 80048 BASIC METABOLIC PNL TOTAL CA: CPT | Performed by: SURGERY

## 2017-04-06 PROCEDURE — 97530 THERAPEUTIC ACTIVITIES: CPT

## 2017-04-06 PROCEDURE — 97535 SELF CARE MNGMENT TRAINING: CPT

## 2017-04-06 PROCEDURE — 85027 COMPLETE CBC AUTOMATED: CPT | Performed by: SURGERY

## 2017-04-06 RX ADMIN — OXYCODONE HYDROCHLORIDE 10 MG: 10 TABLET ORAL at 10:23

## 2017-04-06 RX ADMIN — SODIUM CHLORIDE 40 ML/HR: 0.9 INJECTION, SOLUTION INTRAVENOUS at 06:09

## 2017-04-06 RX ADMIN — ESCITALOPRAM OXALATE 5 MG: 10 TABLET ORAL at 10:12

## 2017-04-06 RX ADMIN — PANTOPRAZOLE SODIUM 40 MG: 40 INJECTION, POWDER, FOR SOLUTION INTRAVENOUS at 10:12

## 2017-04-06 RX ADMIN — ALLOPURINOL 100 MG: 100 TABLET ORAL at 10:12

## 2017-04-06 RX ADMIN — PANTOPRAZOLE SODIUM 40 MG: 40 INJECTION, POWDER, FOR SOLUTION INTRAVENOUS at 21:53

## 2017-04-07 ENCOUNTER — APPOINTMENT (INPATIENT)
Dept: PHYSICAL THERAPY | Facility: HOSPITAL | Age: 82
DRG: 378 | End: 2017-04-07
Payer: COMMERCIAL

## 2017-04-07 LAB
ABO GROUP BLD: NORMAL
BASOPHILS # BLD AUTO: 0.03 THOUSANDS/ΜL (ref 0–0.1)
BASOPHILS NFR BLD AUTO: 0 % (ref 0–1)
BLD GP AB SCN SERPL QL: NEGATIVE
EOSINOPHIL # BLD AUTO: 0.18 THOUSAND/ΜL (ref 0–0.61)
EOSINOPHIL NFR BLD AUTO: 2 % (ref 0–6)
ERYTHROCYTE [DISTWIDTH] IN BLOOD BY AUTOMATED COUNT: 13.8 % (ref 11.6–15.1)
HCT VFR BLD AUTO: 21.1 % (ref 34.8–46.1)
HCT VFR BLD AUTO: 21.6 % (ref 34.8–46.1)
HGB BLD-MCNC: 6.9 G/DL (ref 11.5–15.4)
HGB BLD-MCNC: 7 G/DL (ref 11.5–15.4)
LYMPHOCYTES # BLD AUTO: 1.82 THOUSANDS/ΜL (ref 0.6–4.47)
LYMPHOCYTES NFR BLD AUTO: 21 % (ref 14–44)
MCH RBC QN AUTO: 31.2 PG (ref 26.8–34.3)
MCHC RBC AUTO-ENTMCNC: 32.7 G/DL (ref 31.4–37.4)
MCV RBC AUTO: 96 FL (ref 82–98)
MONOCYTES # BLD AUTO: 0.65 THOUSAND/ΜL (ref 0.17–1.22)
MONOCYTES NFR BLD AUTO: 8 % (ref 4–12)
NEUTROPHILS # BLD AUTO: 5.92 THOUSANDS/ΜL (ref 1.85–7.62)
NEUTS SEG NFR BLD AUTO: 69 % (ref 43–75)
NRBC BLD AUTO-RTO: 0 /100 WBCS
PLATELET # BLD AUTO: 209 THOUSANDS/UL (ref 149–390)
PMV BLD AUTO: 10.7 FL (ref 8.9–12.7)
RBC # BLD AUTO: 2.21 MILLION/UL (ref 3.81–5.12)
RH BLD: POSITIVE
WBC # BLD AUTO: 8.67 THOUSAND/UL (ref 4.31–10.16)

## 2017-04-07 PROCEDURE — 97535 SELF CARE MNGMENT TRAINING: CPT

## 2017-04-07 PROCEDURE — 85018 HEMOGLOBIN: CPT | Performed by: SURGERY

## 2017-04-07 PROCEDURE — 85014 HEMATOCRIT: CPT | Performed by: SURGERY

## 2017-04-07 PROCEDURE — 86900 BLOOD TYPING SEROLOGIC ABO: CPT | Performed by: SURGERY

## 2017-04-07 PROCEDURE — 86901 BLOOD TYPING SEROLOGIC RH(D): CPT | Performed by: SURGERY

## 2017-04-07 PROCEDURE — 85025 COMPLETE CBC W/AUTO DIFF WBC: CPT | Performed by: SURGERY

## 2017-04-07 PROCEDURE — 97530 THERAPEUTIC ACTIVITIES: CPT

## 2017-04-07 PROCEDURE — C9113 INJ PANTOPRAZOLE SODIUM, VIA: HCPCS | Performed by: NURSE PRACTITIONER

## 2017-04-07 PROCEDURE — 86850 RBC ANTIBODY SCREEN: CPT | Performed by: SURGERY

## 2017-04-07 PROCEDURE — 97116 GAIT TRAINING THERAPY: CPT

## 2017-04-07 RX ORDER — POLYETHYLENE GLYCOL 3350 17 G/17G
17 POWDER, FOR SOLUTION ORAL DAILY
Status: DISCONTINUED | OUTPATIENT
Start: 2017-04-07 | End: 2017-04-08 | Stop reason: HOSPADM

## 2017-04-07 RX ORDER — ACETAMINOPHEN 325 MG/1
650 TABLET ORAL EVERY 6 HOURS PRN
Status: DISCONTINUED | OUTPATIENT
Start: 2017-04-07 | End: 2017-04-08 | Stop reason: HOSPADM

## 2017-04-07 RX ORDER — DOCUSATE SODIUM 100 MG/1
100 CAPSULE, LIQUID FILLED ORAL 2 TIMES DAILY
Status: DISCONTINUED | OUTPATIENT
Start: 2017-04-07 | End: 2017-04-08 | Stop reason: HOSPADM

## 2017-04-07 RX ADMIN — OXYCODONE HYDROCHLORIDE 10 MG: 10 TABLET ORAL at 08:33

## 2017-04-07 RX ADMIN — PANTOPRAZOLE SODIUM 40 MG: 40 INJECTION, POWDER, FOR SOLUTION INTRAVENOUS at 21:33

## 2017-04-07 RX ADMIN — DOCUSATE SODIUM 100 MG: 100 CAPSULE, LIQUID FILLED ORAL at 08:33

## 2017-04-07 RX ADMIN — ESCITALOPRAM OXALATE 5 MG: 10 TABLET ORAL at 08:34

## 2017-04-07 RX ADMIN — ACETAMINOPHEN 650 MG: 325 TABLET, FILM COATED ORAL at 22:36

## 2017-04-07 RX ADMIN — ALLOPURINOL 100 MG: 100 TABLET ORAL at 08:33

## 2017-04-07 RX ADMIN — DOCUSATE SODIUM 100 MG: 100 CAPSULE, LIQUID FILLED ORAL at 17:56

## 2017-04-08 VITALS
HEIGHT: 58 IN | BODY MASS INDEX: 25.87 KG/M2 | RESPIRATION RATE: 20 BRPM | TEMPERATURE: 99.3 F | HEART RATE: 66 BPM | WEIGHT: 123.24 LBS | SYSTOLIC BLOOD PRESSURE: 142 MMHG | OXYGEN SATURATION: 95 % | DIASTOLIC BLOOD PRESSURE: 61 MMHG

## 2017-04-08 PROCEDURE — C9113 INJ PANTOPRAZOLE SODIUM, VIA: HCPCS | Performed by: NURSE PRACTITIONER

## 2017-04-08 RX ADMIN — DOCUSATE SODIUM 100 MG: 100 CAPSULE, LIQUID FILLED ORAL at 09:41

## 2017-04-08 RX ADMIN — POLYETHYLENE GLYCOL 3350 17 G: 17 POWDER, FOR SOLUTION ORAL at 09:40

## 2017-04-08 RX ADMIN — PANTOPRAZOLE SODIUM 40 MG: 40 INJECTION, POWDER, FOR SOLUTION INTRAVENOUS at 09:41

## 2017-04-08 RX ADMIN — ESCITALOPRAM OXALATE 5 MG: 10 TABLET ORAL at 09:41

## 2017-04-08 RX ADMIN — ALLOPURINOL 100 MG: 100 TABLET ORAL at 09:41

## 2017-08-29 ENCOUNTER — GENERIC CONVERSION - ENCOUNTER (OUTPATIENT)
Dept: OTHER | Facility: OTHER | Age: 82
End: 2017-08-29

## 2017-10-03 ENCOUNTER — GENERIC CONVERSION - ENCOUNTER (OUTPATIENT)
Dept: OTHER | Facility: OTHER | Age: 82
End: 2017-10-03

## 2017-10-03 ENCOUNTER — APPOINTMENT (OUTPATIENT)
Dept: RADIATION ONCOLOGY | Facility: HOSPITAL | Age: 82
End: 2017-10-03
Attending: RADIOLOGY
Payer: COMMERCIAL

## 2017-10-03 PROCEDURE — 99214 OFFICE O/P EST MOD 30 MIN: CPT | Performed by: RADIOLOGY

## 2018-01-09 NOTE — PROGRESS NOTES
Discussion/Summary  Social [facility] Patient is being seen for a distress screenning assessment  LSW reviewed pt's distress thermometer completed by pt on 6/21/2016  Pt rated their distress as a 6/10 and named the following problems: transportation, insurance/financial, dealing with close friend/relative, depression, dears, sadness, and worry  LSW met with pt in person on 6/28/2016 to asses pt's psychosocial needs  LSW introduced herself and her role as a cancer care counselor  Pt denied insurance/financial problems are currently are problem  LSW encouraged pt to contact LSW if these problems arise  In regards to her transportation problem, pt is using [facility] [employer] therefore this issue is resolved  In regards to her reports of dealing with close friend//realtive, pt reports her problems is related to caring for her dogs when her daughter is at work  Pt is a retired psychologist and professor  She currently lives with her adult daughter  Pt reports feeling well supported by her daughter  In regards to her emotional problems, pt discussed how these problems are related to aging, problems hearing and getting around  LSW offered emotional support during this visit  It does not appear pt has challenges coping with her current cancer treatment  Pt stated she has a "normal amount" of negative feelings  Pt denied interest in counseling at this time  LSW's information was provided to pt  LSW is available if pt expresses interest in cancer care counseling        Signatures   Electronically signed by : RAGHU Moore; Jun 29 2016 11:08AM EST                       (Author)

## 2018-01-13 VITALS
HEIGHT: 55 IN | SYSTOLIC BLOOD PRESSURE: 120 MMHG | OXYGEN SATURATION: 96 % | BODY MASS INDEX: 29.85 KG/M2 | WEIGHT: 129 LBS | HEART RATE: 60 BPM | RESPIRATION RATE: 18 BRPM | DIASTOLIC BLOOD PRESSURE: 64 MMHG | TEMPERATURE: 98 F

## 2018-01-13 VITALS
OXYGEN SATURATION: 96 % | BODY MASS INDEX: 27.77 KG/M2 | RESPIRATION RATE: 16 BRPM | SYSTOLIC BLOOD PRESSURE: 110 MMHG | DIASTOLIC BLOOD PRESSURE: 60 MMHG | TEMPERATURE: 97.3 F | HEART RATE: 71 BPM | WEIGHT: 120 LBS | HEIGHT: 55 IN

## 2018-01-13 NOTE — MISCELLANEOUS
Message   Recorded as Task   Date: 04/03/2017 03:42 PM, Created By: Madeline Miles   Task Name: Care Coordination   Assigned To: [facility],Team   Regarding Patient: Frank Almaguer, Status: In Progress   Nora Green - 03 Apr 2017 3:42 PM     TASK CREATED  received message from answering service to call pt about scheduling an appt  LMOM for return call  Juana Quintero - 03 Apr 2017 3:42 PM     TASK IN PROGRESS   Junaa Daniels - 04 Apr 2017 2:27 PM     TASK EDITED  Pt is currently in the hospital   Pts daughter will c/b when pt is released  Active Problems    1  Actinic keratosis (702 0) (L57 0)   2  Breast cancer, left (174 9) (C50 912)   3  Cellulitis (682 9) (L03 90)   4  Chronic kidney disease, stage 3 (585 3) (N18 3)   5  DJD (degenerative joint disease) (715 90) (M19 90)   6  Encounter for pessary maintenance (V53 99) (Z46 89)   7  Eye infection, left (360 00) (H44 002)   8  Hearing loss (389 9) (H91 90)   9  Heart murmur (785 2) (R01 1)   10  Hyperlipidemia (272 4) (E78 5)   11  Osteoarthritis (715 90) (M19 90)   12  Osteoporosis (733 00) (M81 0)   13  Squamous cell carcinoma of skin (173 92) (C44 92)   14  Vitamin D deficiency (268 9) (E55 9)    Current Meds   1  Allopurinol 100 MG Oral Tablet; TAKE TABLET Every morning; Therapy: (Recorded:25Oct2016) to Recorded   2  Escitalopram Oxalate 5 MG Oral Tablet; TAKE 1 TABLET DAILY; Therapy: (Recorded:[id number]) to Recorded   3  Lisinopril 10 MG Oral Tablet; TAKE 1 TABLET DAILY; Therapy: (Recorded:[id number]) to Recorded   4  Prolia 60 MG/ML Subcutaneous Solution; INJECT SUBCUTANEOUSLY  60 MG / 1 ML   EVERY 6 MONTHS; Therapy: (Recorded:34Lyg0975) to Recorded   5  TobraDex ST SUSP; Therapy: (Recorded:52Ozh9389) to Recorded    Allergies    1  No Known Drug Allergies    Signatures   Electronically signed by :  Aury Berrios, ; Apr 4 2017  2:27PM EST                       (Author)

## 2020-04-28 RX ORDER — METHYLPHENIDATE HYDROCHLORIDE 5 MG/1
TABLET ORAL
COMMUNITY
Start: 2020-02-17 | End: 2020-05-04

## 2020-04-28 RX ORDER — FEBUXOSTAT 40 MG/1
TABLET, FILM COATED ORAL
COMMUNITY
Start: 2020-02-14 | End: 2020-08-13

## 2020-04-28 RX ORDER — LISINOPRIL 10 MG/1
1 TABLET ORAL DAILY
COMMUNITY
End: 2020-07-27 | Stop reason: ALTCHOICE

## 2020-04-28 RX ORDER — DONEPEZIL HYDROCHLORIDE 10 MG/1
TABLET, FILM COATED ORAL
COMMUNITY
Start: 2020-04-16 | End: 2020-09-17 | Stop reason: HOSPADM

## 2020-04-28 RX ORDER — ACETAMINOPHEN AND CODEINE PHOSPHATE 300; 60 MG/1; MG/1
TABLET ORAL
COMMUNITY
Start: 2020-03-03 | End: 2020-07-27 | Stop reason: ALTCHOICE

## 2020-04-28 RX ORDER — CEPHALEXIN 500 MG/1
CAPSULE ORAL
COMMUNITY
Start: 2020-03-27 | End: 2020-07-27 | Stop reason: ALTCHOICE

## 2020-04-28 RX ORDER — OXYCODONE HYDROCHLORIDE 5 MG/1
TABLET ORAL
COMMUNITY
Start: 2020-02-03 | End: 2020-07-27 | Stop reason: ALTCHOICE

## 2020-04-28 RX ORDER — MEMANTINE HYDROCHLORIDE 10 MG/1
TABLET ORAL
COMMUNITY
Start: 2020-03-13

## 2020-04-28 RX ORDER — OXYCODONE HYDROCHLORIDE AND ACETAMINOPHEN 5; 325 MG/1; MG/1
TABLET ORAL
COMMUNITY
Start: 2020-02-11 | End: 2020-07-27 | Stop reason: ALTCHOICE

## 2020-04-28 RX ORDER — LINACLOTIDE 72 UG/1
CAPSULE, GELATIN COATED ORAL
COMMUNITY
Start: 2020-02-14 | End: 2020-07-13

## 2020-04-28 RX ORDER — FLUOXETINE HYDROCHLORIDE 20 MG/1
CAPSULE ORAL
COMMUNITY
Start: 2020-02-06 | End: 2020-05-04

## 2020-05-04 DIAGNOSIS — F32.A DEPRESSION, UNSPECIFIED DEPRESSION TYPE: Primary | ICD-10-CM

## 2020-05-04 RX ORDER — NIFEDIPINE 30 MG/1
TABLET, EXTENDED RELEASE ORAL
COMMUNITY
End: 2020-07-27 | Stop reason: ALTCHOICE

## 2020-05-04 RX ORDER — SIMVASTATIN 5 MG
TABLET ORAL
COMMUNITY
End: 2020-07-27 | Stop reason: ALTCHOICE

## 2020-05-04 RX ORDER — FLUOXETINE HYDROCHLORIDE 20 MG/1
CAPSULE ORAL
Qty: 90 CAPSULE | Refills: 0 | Status: SHIPPED | OUTPATIENT
Start: 2020-05-04 | End: 2020-07-06

## 2020-05-04 RX ORDER — OMEGA-3-ACID ETHYL ESTERS 1 G/1
CAPSULE, LIQUID FILLED ORAL
COMMUNITY
End: 2020-07-27 | Stop reason: ALTCHOICE

## 2020-05-04 RX ORDER — HYDROCODONE BITARTRATE AND ACETAMINOPHEN 5; 325 MG/1; MG/1
TABLET ORAL
COMMUNITY
End: 2020-07-27 | Stop reason: ALTCHOICE

## 2020-05-04 RX ORDER — METHYLPHENIDATE HYDROCHLORIDE 5 MG/1
TABLET ORAL
Qty: 90 TABLET | Refills: 0 | Status: SHIPPED | OUTPATIENT
Start: 2020-05-04 | End: 2020-07-13

## 2020-05-04 RX ORDER — ACYCLOVIR 50 MG/G
CREAM TOPICAL
COMMUNITY
End: 2020-07-27 | Stop reason: ALTCHOICE

## 2020-05-04 RX ORDER — FLUTICASONE FUROATE AND VILANTEROL 100; 25 UG/1; UG/1
POWDER RESPIRATORY (INHALATION)
COMMUNITY
End: 2020-07-27 | Stop reason: ALTCHOICE

## 2020-05-04 RX ORDER — CIPROFLOXACIN 500 MG/1
500 TABLET, FILM COATED ORAL 2 TIMES DAILY
COMMUNITY
End: 2020-07-27 | Stop reason: ALTCHOICE

## 2020-05-04 RX ORDER — OMEPRAZOLE 20 MG/1
CAPSULE, DELAYED RELEASE ORAL
COMMUNITY
End: 2020-07-27 | Stop reason: ALTCHOICE

## 2020-05-04 RX ORDER — METRONIDAZOLE 500 MG/1
TABLET ORAL
COMMUNITY
End: 2020-07-27 | Stop reason: ALTCHOICE

## 2020-05-04 RX ORDER — FLUCONAZOLE 100 MG/1
TABLET ORAL
COMMUNITY
End: 2020-07-27 | Stop reason: ALTCHOICE

## 2020-05-04 RX ORDER — NEOMYCIN AND POLYMYXIN B SULFATES AND BACITRACIN ZINC 400; 3.5; 1 [USP'U]/G; MG/G; [USP'U]/G
OINTMENT OPHTHALMIC
COMMUNITY
End: 2020-07-27 | Stop reason: ALTCHOICE

## 2020-05-04 RX ORDER — VALACYCLOVIR HYDROCHLORIDE 1 G/1
TABLET, FILM COATED ORAL
COMMUNITY
End: 2020-07-27 | Stop reason: ALTCHOICE

## 2020-05-04 RX ORDER — MIRTAZAPINE 15 MG/1
TABLET, FILM COATED ORAL
COMMUNITY
End: 2020-07-27 | Stop reason: ALTCHOICE

## 2020-05-04 RX ORDER — FLUTICASONE PROPIONATE 50 MCG
SPRAY, SUSPENSION (ML) NASAL
COMMUNITY
End: 2020-07-27 | Stop reason: ALTCHOICE

## 2020-05-04 RX ORDER — NEOMYCIN SULFATE, POLYMYXIN B SULFATE, AND DEXAMETHASONE 3.5; 10000; 1 MG/G; [USP'U]/G; MG/G
OINTMENT OPHTHALMIC
COMMUNITY
End: 2020-07-27 | Stop reason: ALTCHOICE

## 2020-05-04 RX ORDER — LABETALOL 100 MG/1
TABLET, FILM COATED ORAL
COMMUNITY
End: 2020-07-27 | Stop reason: ALTCHOICE

## 2020-05-04 RX ORDER — LIDOCAINE 50 MG/G
PATCH TOPICAL
COMMUNITY
End: 2020-07-27 | Stop reason: ALTCHOICE

## 2020-06-25 ENCOUNTER — APPOINTMENT (OUTPATIENT)
Dept: LAB | Facility: CLINIC | Age: 85
End: 2020-06-25
Payer: COMMERCIAL

## 2020-06-25 ENCOUNTER — TRANSCRIBE ORDERS (OUTPATIENT)
Dept: LAB | Facility: CLINIC | Age: 85
End: 2020-06-25

## 2020-06-25 DIAGNOSIS — M10.00 ACUTE IDIOPATHIC GOUT, UNSPECIFIED SITE: ICD-10-CM

## 2020-06-25 DIAGNOSIS — E55.9 VITAMIN D DEFICIENCY DISEASE: ICD-10-CM

## 2020-06-25 DIAGNOSIS — M81.0 SENILE OSTEOPOROSIS: ICD-10-CM

## 2020-06-25 DIAGNOSIS — M81.0 SENILE OSTEOPOROSIS: Primary | ICD-10-CM

## 2020-06-25 LAB
25(OH)D3 SERPL-MCNC: 25.9 NG/ML (ref 30–100)
ALBUMIN SERPL BCP-MCNC: 3.3 G/DL (ref 3.5–5)
ALP SERPL-CCNC: 68 U/L (ref 46–116)
ALT SERPL W P-5'-P-CCNC: 14 U/L (ref 12–78)
ANION GAP SERPL CALCULATED.3IONS-SCNC: 7 MMOL/L (ref 4–13)
AST SERPL W P-5'-P-CCNC: 16 U/L (ref 5–45)
BASOPHILS # BLD AUTO: 0.07 THOUSANDS/ΜL (ref 0–0.1)
BASOPHILS NFR BLD AUTO: 1 % (ref 0–1)
BILIRUB SERPL-MCNC: 0.5 MG/DL (ref 0.2–1)
BUN SERPL-MCNC: 23 MG/DL (ref 5–25)
CALCIUM ALBUM COR SERPL-MCNC: 10.9 MG/DL (ref 8.3–10.1)
CALCIUM SERPL-MCNC: 10.3 MG/DL (ref 8.3–10.1)
CHLORIDE SERPL-SCNC: 111 MMOL/L (ref 100–108)
CO2 SERPL-SCNC: 23 MMOL/L (ref 21–32)
CREAT SERPL-MCNC: 1.91 MG/DL (ref 0.6–1.3)
EOSINOPHIL # BLD AUTO: 0.11 THOUSAND/ΜL (ref 0–0.61)
EOSINOPHIL NFR BLD AUTO: 1 % (ref 0–6)
ERYTHROCYTE [DISTWIDTH] IN BLOOD BY AUTOMATED COUNT: 15.7 % (ref 11.6–15.1)
GFR SERPL CREATININE-BSD FRML MDRD: 22 ML/MIN/1.73SQ M
GLUCOSE SERPL-MCNC: 153 MG/DL (ref 65–140)
HCT VFR BLD AUTO: 40.6 % (ref 34.8–46.1)
HGB BLD-MCNC: 12.2 G/DL (ref 11.5–15.4)
IMM GRANULOCYTES # BLD AUTO: 0.04 THOUSAND/UL (ref 0–0.2)
IMM GRANULOCYTES NFR BLD AUTO: 0 % (ref 0–2)
LYMPHOCYTES # BLD AUTO: 4.58 THOUSANDS/ΜL (ref 0.6–4.47)
LYMPHOCYTES NFR BLD AUTO: 38 % (ref 14–44)
MCH RBC QN AUTO: 28.6 PG (ref 26.8–34.3)
MCHC RBC AUTO-ENTMCNC: 30 G/DL (ref 31.4–37.4)
MCV RBC AUTO: 95 FL (ref 82–98)
MONOCYTES # BLD AUTO: 1.07 THOUSAND/ΜL (ref 0.17–1.22)
MONOCYTES NFR BLD AUTO: 9 % (ref 4–12)
NEUTROPHILS # BLD AUTO: 6.24 THOUSANDS/ΜL (ref 1.85–7.62)
NEUTS SEG NFR BLD AUTO: 51 % (ref 43–75)
NRBC BLD AUTO-RTO: 0 /100 WBCS
PLATELET # BLD AUTO: 191 THOUSANDS/UL (ref 149–390)
PMV BLD AUTO: 11.8 FL (ref 8.9–12.7)
POTASSIUM SERPL-SCNC: 3.6 MMOL/L (ref 3.5–5.3)
PROT SERPL-MCNC: 8.2 G/DL (ref 6.4–8.2)
RBC # BLD AUTO: 4.26 MILLION/UL (ref 3.81–5.12)
SODIUM SERPL-SCNC: 141 MMOL/L (ref 136–145)
URATE SERPL-MCNC: 3.9 MG/DL (ref 2–6.8)
WBC # BLD AUTO: 12.11 THOUSAND/UL (ref 4.31–10.16)

## 2020-06-25 PROCEDURE — 80053 COMPREHEN METABOLIC PANEL: CPT

## 2020-06-25 PROCEDURE — 82306 VITAMIN D 25 HYDROXY: CPT

## 2020-06-25 PROCEDURE — 85025 COMPLETE CBC W/AUTO DIFF WBC: CPT

## 2020-06-25 PROCEDURE — 84550 ASSAY OF BLOOD/URIC ACID: CPT

## 2020-06-25 PROCEDURE — 36415 COLL VENOUS BLD VENIPUNCTURE: CPT

## 2020-07-06 ENCOUNTER — TELEPHONE (OUTPATIENT)
Dept: FAMILY MEDICINE CLINIC | Facility: CLINIC | Age: 85
End: 2020-07-06

## 2020-07-06 DIAGNOSIS — F32.A DEPRESSION, UNSPECIFIED DEPRESSION TYPE: Primary | ICD-10-CM

## 2020-07-06 DIAGNOSIS — F32.A DEPRESSION, UNSPECIFIED DEPRESSION TYPE: ICD-10-CM

## 2020-07-06 RX ORDER — FLUOXETINE HYDROCHLORIDE 20 MG/1
CAPSULE ORAL
Qty: 90 CAPSULE | Refills: 0 | Status: SHIPPED | OUTPATIENT
Start: 2020-07-06 | End: 2020-07-06

## 2020-07-06 RX ORDER — FLUOXETINE HYDROCHLORIDE 40 MG/1
40 CAPSULE ORAL DAILY
Qty: 90 CAPSULE | Refills: 1 | Status: SHIPPED | OUTPATIENT
Start: 2020-07-06

## 2020-07-06 RX ORDER — PANTOPRAZOLE SODIUM 40 MG/1
TABLET, DELAYED RELEASE ORAL
Status: ON HOLD | COMMUNITY
Start: 2020-06-05 | End: 2020-08-18 | Stop reason: SDUPTHER

## 2020-07-06 NOTE — TELEPHONE ENCOUNTER
Flaco Maynard from [facility] called and said the script you sent over was for 20mg but patient's daughter said the last you told her was to be on 40mg? He just wanted to clarify and if so can you send a new script over please

## 2020-07-13 ENCOUNTER — TELEPHONE (OUTPATIENT)
Dept: FAMILY MEDICINE CLINIC | Facility: CLINIC | Age: 85
End: 2020-07-13

## 2020-07-13 DIAGNOSIS — F32.A DEPRESSION, UNSPECIFIED DEPRESSION TYPE: Primary | ICD-10-CM

## 2020-07-13 RX ORDER — METHYLPHENIDATE HYDROCHLORIDE 10 MG/1
10 TABLET ORAL
Qty: 90 TABLET | Refills: 0 | Status: SHIPPED | OUTPATIENT
Start: 2020-07-13 | End: 2020-08-18 | Stop reason: HOSPADM

## 2020-07-13 NOTE — TELEPHONE ENCOUNTER
July Paredes is requesting a refill for Linzess 145 mcg and Methylphenidate 10mg since the dosage of both meds was increased

## 2020-07-13 NOTE — TELEPHONE ENCOUNTER
Carroll Urena, patient's daughter called to let the doctor know how Genevive Marine is doing  She states that Genevive Marine is down to 123/124 lbs and that she is giving them a hard time about eating again  She is impossible to get up, she said since she started doubling up on the medication Linzest and methylphenidate

## 2020-07-24 ENCOUNTER — TELEPHONE (OUTPATIENT)
Dept: FAMILY MEDICINE CLINIC | Facility: CLINIC | Age: 85
End: 2020-07-24

## 2020-07-24 NOTE — TELEPHONE ENCOUNTER
Connor Ruiz (daughter) is calling for Donnaadry Brando  She states that Garrick Michaels is not eating  When they give her the food she throws it on the floor  Would like to know if there is a medication she can take to help her appetite

## 2020-07-27 ENCOUNTER — HOSPITAL ENCOUNTER (EMERGENCY)
Facility: HOSPITAL | Age: 85
Discharge: HOME | End: 2020-07-27
Attending: EMERGENCY MEDICINE | Admitting: EMERGENCY MEDICINE
Payer: COMMERCIAL

## 2020-07-27 ENCOUNTER — TELEPHONE (OUTPATIENT)
Dept: FAMILY MEDICINE CLINIC | Facility: CLINIC | Age: 85
End: 2020-07-27

## 2020-07-27 VITALS
HEART RATE: 51 BPM | SYSTOLIC BLOOD PRESSURE: 170 MMHG | TEMPERATURE: 98.8 F | DIASTOLIC BLOOD PRESSURE: 72 MMHG | RESPIRATION RATE: 18 BRPM | OXYGEN SATURATION: 96 %

## 2020-07-27 DIAGNOSIS — R82.90 URINE ABNORMALITY: ICD-10-CM

## 2020-07-27 DIAGNOSIS — F03.90 DEMENTIA (HCC): ICD-10-CM

## 2020-07-27 DIAGNOSIS — E86.0 MILD DEHYDRATION: Primary | ICD-10-CM

## 2020-07-27 LAB
ALBUMIN SERPL BCP-MCNC: 3.2 G/DL (ref 3.5–5)
ALP SERPL-CCNC: 71 U/L (ref 46–116)
ALT SERPL W P-5'-P-CCNC: 24 U/L (ref 12–78)
ANION GAP SERPL CALCULATED.3IONS-SCNC: 10 MMOL/L (ref 4–13)
APTT PPP: 31 SECONDS (ref 23–37)
AST SERPL W P-5'-P-CCNC: 47 U/L (ref 5–45)
BACTERIA UR QL AUTO: ABNORMAL /HPF
BASOPHILS # BLD AUTO: 0.07 THOUSANDS/ΜL (ref 0–0.1)
BASOPHILS NFR BLD AUTO: 1 % (ref 0–1)
BILIRUB SERPL-MCNC: 0.56 MG/DL (ref 0.2–1)
BILIRUB UR QL STRIP: NEGATIVE
BUN SERPL-MCNC: 32 MG/DL (ref 5–25)
CALCIUM SERPL-MCNC: 9.3 MG/DL (ref 8.3–10.1)
CHLORIDE SERPL-SCNC: 103 MMOL/L (ref 100–108)
CLARITY UR: ABNORMAL
CO2 SERPL-SCNC: 25 MMOL/L (ref 21–32)
COLOR UR: YELLOW
CREAT SERPL-MCNC: 2.12 MG/DL (ref 0.6–1.3)
EOSINOPHIL # BLD AUTO: 0.09 THOUSAND/ΜL (ref 0–0.61)
EOSINOPHIL NFR BLD AUTO: 1 % (ref 0–6)
ERYTHROCYTE [DISTWIDTH] IN BLOOD BY AUTOMATED COUNT: 15.2 % (ref 11.6–15.1)
GFR SERPL CREATININE-BSD FRML MDRD: 19 ML/MIN/1.73SQ M
GLUCOSE SERPL-MCNC: 124 MG/DL (ref 65–140)
GLUCOSE UR STRIP-MCNC: NEGATIVE MG/DL
HCT VFR BLD AUTO: 42.1 % (ref 34.8–46.1)
HGB BLD-MCNC: 12.8 G/DL (ref 11.5–15.4)
HGB UR QL STRIP.AUTO: ABNORMAL
IMM GRANULOCYTES # BLD AUTO: 0.02 THOUSAND/UL (ref 0–0.2)
IMM GRANULOCYTES NFR BLD AUTO: 0 % (ref 0–2)
INR PPP: 1.17 (ref 0.84–1.19)
KETONES UR STRIP-MCNC: NEGATIVE MG/DL
LACTATE SERPL-SCNC: 1.9 MMOL/L (ref 0.5–2)
LEUKOCYTE ESTERASE UR QL STRIP: NEGATIVE
LYMPHOCYTES # BLD AUTO: 2.31 THOUSANDS/ΜL (ref 0.6–4.47)
LYMPHOCYTES NFR BLD AUTO: 25 % (ref 14–44)
MCH RBC QN AUTO: 29.4 PG (ref 26.8–34.3)
MCHC RBC AUTO-ENTMCNC: 30.4 G/DL (ref 31.4–37.4)
MCV RBC AUTO: 97 FL (ref 82–98)
MONOCYTES # BLD AUTO: 0.82 THOUSAND/ΜL (ref 0.17–1.22)
MONOCYTES NFR BLD AUTO: 9 % (ref 4–12)
NEUTROPHILS # BLD AUTO: 5.97 THOUSANDS/ΜL (ref 1.85–7.62)
NEUTS SEG NFR BLD AUTO: 64 % (ref 43–75)
NITRITE UR QL STRIP: NEGATIVE
NON-SQ EPI CELLS URNS QL MICRO: ABNORMAL /HPF
NRBC BLD AUTO-RTO: 0 /100 WBCS
PH UR STRIP.AUTO: 5.5 [PH]
PLATELET # BLD AUTO: 207 THOUSANDS/UL (ref 149–390)
PMV BLD AUTO: 10.9 FL (ref 8.9–12.7)
POTASSIUM SERPL-SCNC: 4 MMOL/L (ref 3.5–5.3)
PROT SERPL-MCNC: 7.8 G/DL (ref 6.4–8.2)
PROT UR STRIP-MCNC: NEGATIVE MG/DL
PROTHROMBIN TIME: 14.2 SECONDS (ref 11.6–14.5)
RBC # BLD AUTO: 4.36 MILLION/UL (ref 3.81–5.12)
RBC #/AREA URNS AUTO: ABNORMAL /HPF
SODIUM SERPL-SCNC: 138 MMOL/L (ref 136–145)
SP GR UR STRIP.AUTO: 1.02 (ref 1–1.03)
UROBILINOGEN UR QL STRIP.AUTO: 0.2 E.U./DL
WBC # BLD AUTO: 9.28 THOUSAND/UL (ref 4.31–10.16)
WBC #/AREA URNS AUTO: ABNORMAL /HPF

## 2020-07-27 PROCEDURE — 80053 COMPREHEN METABOLIC PANEL: CPT | Performed by: EMERGENCY MEDICINE

## 2020-07-27 PROCEDURE — 36415 COLL VENOUS BLD VENIPUNCTURE: CPT | Performed by: EMERGENCY MEDICINE

## 2020-07-27 PROCEDURE — 96361 HYDRATE IV INFUSION ADD-ON: CPT

## 2020-07-27 PROCEDURE — 87040 BLOOD CULTURE FOR BACTERIA: CPT | Performed by: EMERGENCY MEDICINE

## 2020-07-27 PROCEDURE — 83605 ASSAY OF LACTIC ACID: CPT | Performed by: EMERGENCY MEDICINE

## 2020-07-27 PROCEDURE — 85610 PROTHROMBIN TIME: CPT | Performed by: EMERGENCY MEDICINE

## 2020-07-27 PROCEDURE — 85730 THROMBOPLASTIN TIME PARTIAL: CPT | Performed by: EMERGENCY MEDICINE

## 2020-07-27 PROCEDURE — 99284 EMERGENCY DEPT VISIT MOD MDM: CPT | Performed by: EMERGENCY MEDICINE

## 2020-07-27 PROCEDURE — 87086 URINE CULTURE/COLONY COUNT: CPT | Performed by: EMERGENCY MEDICINE

## 2020-07-27 PROCEDURE — 96360 HYDRATION IV INFUSION INIT: CPT

## 2020-07-27 PROCEDURE — 84145 PROCALCITONIN (PCT): CPT | Performed by: EMERGENCY MEDICINE

## 2020-07-27 PROCEDURE — 99283 EMERGENCY DEPT VISIT LOW MDM: CPT

## 2020-07-27 PROCEDURE — 81001 URINALYSIS AUTO W/SCOPE: CPT | Performed by: EMERGENCY MEDICINE

## 2020-07-27 PROCEDURE — 85025 COMPLETE CBC W/AUTO DIFF WBC: CPT | Performed by: EMERGENCY MEDICINE

## 2020-07-27 RX ORDER — ERGOCALCIFEROL 1.25 MG/1
CAPSULE ORAL
COMMUNITY
Start: 2020-07-06

## 2020-07-27 RX ADMIN — SODIUM CHLORIDE 1000 ML: 0.9 INJECTION, SOLUTION INTRAVENOUS at 18:45

## 2020-07-27 NOTE — ED PROVIDER NOTES
History  Chief Complaint   Patient presents with    Possible UTI     PT presents w/UTI x several days  urine sample gave to NP @ [facility], tested +, then sent here     Patient is a 66-year-old female who presents to the emergency department for evaluation with her daughter  Daughter provides history and notes that in the past 2 days the patient has consumed only 1 in Ensure can and 4 cups of beverage-mainly orange crush  She will not eat or drink anything and has a UTI  Yesterday she was extremely confused-consistent with that experienced with prior urinary tract infections  Yesterday she sat on the toilet for 4 hours repeatedly stating that she needed to urinate  A urine specimen was obtained today and brought into her primary care physician's office where urine dip was done and found to be abnormal   (Results unavailable for review)  Daughter notes that the provider said she had a UTI and that she should go to the emergency department  Patient relates that in the past she would call the office when a UTI was present and antibiotic (oral) would be prescribed  Patient's daughter notes that PCP is on paternity leave  Patient has advanced Alzheimer's and has had significant decrease in appetite and oral intake for quite some time  This is progressively worsening  When foods and beverages are put in front of her she often starts gagging right away despite being coaxed and  to eat and drink  She has been resting in bed for gradually longer quantities of time-most recently up to 18 hours a day  She has been much weaker than usual and required 2 people to assist her back to bed yesterday  Daughter notes that patient was hospitalized for close to 2 weeks in the fall with the UTI and dehydration  Hospice services have been pursued on multiple occasions-most recently early this year prior to Angela ram    Daughter notes the patient has been declined services having been informed she does not meet criteria  She has not lost 20 lb and does not have other changes in status necessary to enroll her in this  Daughter expresses concern that her only calories are coming from sugar  Even favorite foods such as ice cream are often declined and patient will fold these up and put them in her pocket  When patient is questioned how she is feeling she indicates that she is fine  She denies having any pain or nausea  (Daughter notes that she always indicates that she is fine to medical staff )    Daughter initially expressed that she wished an antibiotic had just been prescribed for her mother today  She expresses understanding that her mother's advanced Alzheimer's is causing her lack of appetite /declining interest in eating for which palliative or hospice care would be appropriate  She also expresses concern for dehydration & desires that patient be hydrated  She is fearful for mother to return home and worsen  She desires reinforcement from staff as she informs her mother she needs to eat & drink more  She notes pt  May be more receptive to this  Prior to Admission Medications   Prescriptions Last Dose Informant Patient Reported? Taking?    FLUoxetine (PROzac) 40 MG capsule 7/26/2020 at Unknown time  No Yes   Sig: Take 1 capsule (40 mg total) by mouth daily   donepezil (ARICEPT) 10 mg tablet 7/26/2020 at Unknown time  Yes Yes   ergocalciferol (VITAMIN D2) 50,000 units   Yes Yes   febuxostat (ULORIC) 40 mg tablet 7/26/2020 at Unknown time  Yes Yes   linaCLOtide 145 MCG CAPS 7/26/2020 at Unknown time  No Yes   Sig: Take 1 capsule (145 mcg total) by mouth daily   memantine (NAMENDA) 10 mg tablet 7/26/2020 at Unknown time  Yes Yes   methylphenidate (RITALIN) 10 mg tablet 7/26/2020 at Unknown time  No Yes   Sig: Take 1 tablet (10 mg total) by mouth 2 (two) times a day before breakfast and lunchMax Daily Amount: 20 mg   pantoprazole (PROTONIX) 40 mg tablet 7/26/2020 at Unknown time  Yes Yes Facility-Administered Medications: None       Past Medical History:   Diagnosis Date    Arthritis     reynaulds arthritis    Cancer ([facility] [address] )     Hyperlipidemia     Hypertension     Raynaud's disease     Renal disorder     pt states she had kidney disorders long ago, but cant recall the doctor or the problem       Past Surgical History:   Procedure Laterality Date    BREAST LUMPECTOMY  01/01/1970    lumpectomy    BREAST RECONSTRUCTION  01/01/1980    Bilaterally    CHOLECYSTECTOMY      COLONOSCOPY N/A 4/3/2017    Procedure: COLONOSCOPY;  Surgeon: Nelly Sal MD;  [facility]; Service:     ELBOW SURGERY Right     ESOPHAGOGASTRODUODENOSCOPY N/A 4/3/2017    Procedure: ESOPHAGOGASTRODUODENOSCOPY (EGD); Surgeon: Nelly Sal MD;  [facility]; Service:     FEMUR SURGERY Right     FRACTURE SURGERY      prem to r femur    JOINT REPLACEMENT Right 01/01/2004    knee    JOINT REPLACEMENT Right     hip    OTHER SURGICAL HISTORY  09/2018    Squamous cells removed from nose    REPLACEMENT TOTAL KNEE Right        Family History   Problem Relation Age of Onset    Cancer Mother     Cancer Father     Other Father         prostate disorder     I have reviewed and agree with the history as documented  E-Cigarette/Vaping    E-Cigarette Use Never User      E-Cigarette/Vaping Substances     Social History     Tobacco Use    Smoking status: Never Smoker    Smokeless tobacco: Never Used   Substance Use Topics    Alcohol use: Yes     Comment: occationally    Drug use: No       Review of Systems   Constitutional: Positive for activity change (has gradually been more sedentary)  Negative for fever and unexpected weight change (Patient weight was 124 lb 2 weeks ago at home  )  HENT: Negative for rhinorrhea  Respiratory: Negative for shortness of breath  Cardiovascular: Negative for leg swelling     Gastrointestinal: Positive for abdominal pain ( lower abdominal) and constipation ( small amount of stool passed within the last day , on Linzess)  Negative for blood in stool  Genitourinary: Negative for dysuria  Musculoskeletal: Negative for back pain  Skin: Negative for rash  All other systems reviewed and are negative  Physical Exam  Physical Exam   Constitutional: She appears well-developed and well-nourished  HENT:   Head: Normocephalic  Hard of hearing  Has hearing aides in  Eyes: Conjunctivae and EOM are normal    Cardiovascular: Normal rate and regular rhythm  Pulmonary/Chest: Effort normal and breath sounds normal    Abdominal: Soft  Bowel sounds are normal  There is tenderness (mild suprapubic)  There is no guarding  Musculoskeletal: Normal range of motion  She exhibits no edema or tenderness  Neurological: She is alert  Skin: Skin is warm and dry  Psychiatric: She has a normal mood and affect  Her behavior is normal    Nursing note and vitals reviewed        Vital Signs  ED Triage Vitals [07/27/20 1539]   Temperature Pulse Respirations Blood Pressure SpO2   98 8 °F (37 1 °C) 64 18 (!) 188/79 95 %      Temp Source Heart Rate Source Patient Position - Orthostatic VS BP Location FiO2 (%)   Oral Monitor Sitting Right arm --      Pain Score       --           Vitals:    07/27/20 1539 07/27/20 1900   BP: (!) 188/79 170/72   Pulse: 64 (!) 51   Patient Position - Orthostatic VS: Sitting Lying         Visual Acuity      ED Medications  Medications   sodium chloride 0 9 % bolus 1,000 mL (0 mL Intravenous Stopped 7/27/20 2030)       Diagnostic Studies  Results Reviewed     Procedure Component Value Units Date/Time    Urine Microscopic [[id number]]  (Abnormal) Collected:  07/27/20 1900    Lab Status:  Final result Specimen:  Urine, Straight Cath Updated:  07/27/20 2000     RBC, UA 4-10 /hpf      WBC, UA None Seen /hpf      Epithelial Cells Moderate /hpf      Bacteria, UA Occasional /hpf     Lactic acid [[id number]]  (Normal) Collected:  07/27/20 1831    Lab Status:  Final result Specimen:  Blood from Arm, Right Updated:  07/27/20 1915     LACTIC ACID 1 9 mmol/L     Narrative:       Result may be elevated if tourniquet was used during collection  UA (URINE) with reflex to Scope [[id number]]  (Abnormal) Collected:  07/27/20 1900    Lab Status:  Final result Specimen:  Urine, Straight Cath Updated:  07/27/20 1914     Color, UA Yellow     Clarity, UA Slightly Cloudy     Specific Gravity, UA 1 025     pH, UA 5 5     Leukocytes, UA Negative     Nitrite, UA Negative     Protein, UA Negative mg/dl      Glucose, UA Negative mg/dl      Ketones, UA Negative mg/dl      Urobilinogen, UA 0 2 E U /dl      Bilirubin, UA Negative     Blood, UA Trace-Intact    Comprehensive metabolic panel [[id number]]  (Abnormal) Collected:  07/27/20 1831    Lab Status:  Final result Specimen:  Blood from Arm, Right Updated:  07/27/20 1912     Sodium 138 mmol/L      Potassium 4 0 mmol/L      Chloride 103 mmol/L      CO2 25 mmol/L      ANION GAP 10 mmol/L      BUN 32 mg/dL      Creatinine 2 12 mg/dL      Glucose 124 mg/dL      Calcium 9 3 mg/dL      AST 47 U/L      ALT 24 U/L      Alkaline Phosphatase 71 U/L      Total Protein 7 8 g/dL      Albumin 3 2 g/dL      Total Bilirubin 0 56 mg/dL      eGFR 19 ml/min/1 73sq m     Narrative:       Taisha guidelines for Chronic Kidney Disease (CKD):     Stage 1 with normal or high GFR (GFR > 90 mL/min/1 73 square meters)    Stage 2 Mild CKD (GFR = 60-89 mL/min/1 73 square meters)    Stage 3A Moderate CKD (GFR = 45-59 mL/min/1 73 square meters)    Stage 3B Moderate CKD (GFR = 30-44 mL/min/1 73 square meters)    Stage 4 Severe CKD (GFR = 15-29 mL/min/1 73 square meters)    Stage 5 End Stage CKD (GFR <15 mL/min/1 73 square meters)  Note: GFR calculation is accurate only with a steady state creatinine    Urine culture [[id number]] Collected:  07/27/20 1900    Lab Status:   In process Specimen:  Urine, Other Updated:  07/27/20 1904    Protime-INR [[id number]]  (Normal) Collected:  07/27/20 1831    Lab Status:  Final result Specimen:  Blood from Arm, Right Updated:  07/27/20 1901     Protime 14 2 seconds      INR 1 17    APTT [[id number]]  (Normal) Collected:  07/27/20 1831    Lab Status:  Final result Specimen:  Blood from Arm, Right Updated:  07/27/20 1901     PTT 31 seconds     CBC and differential [[id number]]  (Abnormal) Collected:  07/27/20 1831    Lab Status:  Final result Specimen:  Blood from Arm, Right Updated:  07/27/20 1852     WBC 9 28 Thousand/uL      RBC 4 36 Million/uL      Hemoglobin 12 8 g/dL      Hematocrit 42 1 %      MCV 97 fL      MCH 29 4 pg      MCHC 30 4 g/dL      RDW 15 2 %      MPV 10 9 fL      Platelets 456 Thousands/uL      nRBC 0 /100 WBCs      Neutrophils Relative 64 %      Immat GRANS % 0 %      Lymphocytes Relative 25 %      Monocytes Relative 9 %      Eosinophils Relative 1 %      Basophils Relative 1 %      Neutrophils Absolute 5 97 Thousands/µL      Immature Grans Absolute 0 02 Thousand/uL      Lymphocytes Absolute 2 31 Thousands/µL      Monocytes Absolute 0 82 Thousand/µL      Eosinophils Absolute 0 09 Thousand/µL      Basophils Absolute 0 07 Thousands/µL     Procalcitonin [[id number]] Collected:  07/27/20 1837    Lab Status: In process Specimen:  Blood from Arm, Right Updated:  07/27/20 1848    Blood culture #2 [[id number]] Collected:  07/27/20 1831    Lab Status: In process Specimen:  Blood from Arm, Right Updated:  07/27/20 1846                 No orders to display              Procedures  Procedures         ED Course         ED Course as of Jul 27 2234 Mon Jul 27, 2020 1851 Blood work obtained and sent to lab  Straight catheterization will be performed to obtain urine specimen as patient was unable to provide this  1919 Urine dip is remarkable only for presence of trace blood  Leukocyte esterase and nitrite negative - not suggestive of UTI  Specific gravity wnl & ketones are absent  Urine microscopy pending  CBCs unremarkable  CMP is notable for creatinine of 2 1  This is only slightly higher than creatinine from 1 month ago  200 Study results reviewed with patient and daughter  Daughter pleased that chemistry and CBC are unremarkable as well as the fact that urine is not concentrated nor containing ketones  She notes that she was ultimately able to get her mother to drink a few cups fluid after providing the urine specimen at the doctor's office  I explained the discrepancy in urine testing could be attributable to the amount of concentration there earlier or potential contamination as patient had provided earlier specimen by voiding into a container and easily skin cells and other contamination from the skin could be in that urine  Daughter is comfortable with patient discharge to home and follow-up contact if urine does reveal presence of infection  Microscopy and culture remain pending  She tried emphasizing to her mother the need for her to drink fluid and eat food and encouraged me to try conveying this to her  She expresses frustration over her mother's minimal activity which contributes to her constipation  She states that she understands that this is from the Alzheimer's but based on prior experiences with attempt at Enrolling her in hospice does not feel that she would be accepted there or to palliative care  She states she last tried pursuing palliative care w/ [facility] in early March  She is unable to get her mother out to appointments easily  3183 I did contact the on-call provider for palliative care, Marilee Art and expressed that I feel she would benefit from palliative services/ consult  Office staff will reach out to daughter  A virtual appointment can be held  Daughter was appreciative of Palliative information    I began differentiating between palliative vs  Hospice services and explained that even if her mother did not meet criteria for hospice care that there would definitely be a role for palliative measures  I explained the provider would talk about this more  MDM      Disposition  Final diagnoses:   Mild dehydration   Urine abnormality   Dementia ([address] )     Time reflects when diagnosis was documented in both MDM as applicable and the Disposition within this note     Time User Action Codes Description Comment    7/27/2020  7:41 PM Mckenna Duarte A Add [E86 0] Mild dehydration     7/27/2020  7:42 PM Mckenna Duarte A Add [R82 90] Urine abnormality     7/27/2020  7:51 PM Mckenna Duarte A Add [F03 90] Dementia [facility])       ED Disposition     ED Disposition Condition Date/Time Comment    Discharge Stable Mon Jul 27, 2020  7:41 PM [place] discharge to home/self care              Follow-up Information     Follow up With Specialties Details Why Contact Info Additional Information    [address]  Call  To inquire regarding [address], [address], [place], [place], [address]    Brain MD [facility] Schedule an appointment as soon as possible for a visit   [address] [address]  [address]  [address]  [phone]             Discharge Medication List as of 7/27/2020  7:51 PM      CONTINUE these medications which have NOT CHANGED    Details   donepezil (ARICEPT) 10 mg tablet Starting u 4/16/2020, Historical Med      ergocalciferol (VITAMIN D2) 50,000 units Starting Mon 7/6/2020, Historical Med      febuxostat (ULORIC) 40 mg tablet Starting Fri 2/14/2020, Historical Med      FLUoxetine (PROzac) 40 MG capsule Take 1 capsule (40 mg total) by mouth daily, Starting Mon 7/6/2020, Normal      linaCLOtide 145 MCG CAPS Take 1 capsule (145 mcg total) by mouth daily, Starting Mon 7/13/2020, Normal      memantine (NAMENDA) 10 mg tablet Starting Fri 3/13/2020, Historical Med methylphenidate (RITALIN) 10 mg tablet Take 1 tablet (10 mg total) by mouth 2 (two) times a day before breakfast and lunchMax Daily Amount: 20 mg, Starting Mon 7/13/2020, Normal      pantoprazole (PROTONIX) 40 mg tablet Starting Fri 6/5/2020, Historical Med           No discharge procedures on file      PDMP Review       Value Time User    PDMP Reviewed  Yes 7/13/2020  2:48 PM Jessee Ortiz MD          ED Provider  Electronically Signed by           Danny Ibrahim MD  07/27/20 6643

## 2020-07-27 NOTE — DISCHARGE INSTRUCTIONS
Drink Ensure daily  Drink 6 glasses of beverage daily along with eating foods  As discussed, urine has been sent for extra testing  If a urinary tract infection is found you will be called at home and a prescription for antibiotic called in to your pharmacy

## 2020-07-27 NOTE — ED NOTES
Per Dr Jerrica Villarreal, hold on second set of blood cultures       [facility], RN  07/27/20 6623

## 2020-07-27 NOTE — TELEPHONE ENCOUNTER
Pt's daughter Dana Storey called back and is now concerned she has a UTI  She sits on the toilet for 4 hours, she's delusional and she cannot physically get her to an urgent care or doctors office  She said normally an antibiotic is called in for her?

## 2020-07-27 NOTE — TELEPHONE ENCOUNTER
Pt's daughter brought in a urine sample and I ran it through but was wondering if you could check it for me and I have to call the daughter back as well

## 2020-07-28 LAB
BACTERIA UR CULT: NORMAL
PROCALCITONIN SERPL-MCNC: <0.05 NG/ML

## 2020-07-28 NOTE — ED NOTES
PT awake and alert, no distress noted  No other questions upon d/c       April Lakesha Watts, RN  07/27/20 2041

## 2020-07-28 NOTE — ED NOTES
Patient had bowel movement on bedpan  Patient was cleaned and brief was put back on       April Masha Epstien, DALE  07/27/20 2041

## 2020-08-02 LAB — BACTERIA BLD CULT: NORMAL

## 2020-08-05 ENCOUNTER — TRANSCRIBE ORDERS (OUTPATIENT)
Dept: ADMINISTRATIVE | Facility: HOSPITAL | Age: 85
End: 2020-08-05

## 2020-08-05 DIAGNOSIS — M81.0 SENILE OSTEOPOROSIS: Primary | ICD-10-CM

## 2020-08-10 ENCOUNTER — TELEPHONE (OUTPATIENT)
Dept: FAMILY MEDICINE CLINIC | Facility: CLINIC | Age: 85
End: 2020-08-10

## 2020-08-10 NOTE — TELEPHONE ENCOUNTER
Pt's daughter wants to discuss with you her eating/drinking habits (they're getting worse about less than 500 calories a day)  The pharmacist gave her two recommendations that she could possibly take to increase her appetite  She also gets very nasty towards them  Questions regarding that as well   You have no visits available so she requested I send a message

## 2020-08-12 ENCOUNTER — HOSPITAL ENCOUNTER (OUTPATIENT)
Dept: RADIOLOGY | Facility: HOSPITAL | Age: 85
Discharge: HOME | End: 2020-08-12
Payer: COMMERCIAL

## 2020-08-12 DIAGNOSIS — M81.0 SENILE OSTEOPOROSIS: ICD-10-CM

## 2020-08-12 PROCEDURE — 77080 DXA BONE DENSITY AXIAL: CPT

## 2020-08-12 NOTE — TELEPHONE ENCOUNTER
Daughter called again in regards to this    I did schedule her for an OV 8/21    that was the first available appointment

## 2020-08-13 ENCOUNTER — APPOINTMENT (EMERGENCY)
Dept: CT IMAGING | Facility: HOSPITAL | Age: 85
DRG: 641 | End: 2020-08-13
Payer: COMMERCIAL

## 2020-08-13 ENCOUNTER — HOSPITAL ENCOUNTER (INPATIENT)
Facility: HOSPITAL | Age: 85
LOS: 5 days | Discharge: HOME | DRG: 641 | End: 2020-08-18
Attending: EMERGENCY MEDICINE | Admitting: INTERNAL MEDICINE
Payer: COMMERCIAL

## 2020-08-13 ENCOUNTER — APPOINTMENT (EMERGENCY)
Dept: RADIOLOGY | Facility: HOSPITAL | Age: 85
DRG: 641 | End: 2020-08-13
Payer: COMMERCIAL

## 2020-08-13 DIAGNOSIS — R62.51 FAILURE TO THRIVE (0-17): Primary | ICD-10-CM

## 2020-08-13 DIAGNOSIS — R53.1 ASTHENIA: ICD-10-CM

## 2020-08-13 DIAGNOSIS — R00.1 BRADYCARDIA: ICD-10-CM

## 2020-08-13 DIAGNOSIS — R62.7 FAILURE TO THRIVE IN ADULT: ICD-10-CM

## 2020-08-13 DIAGNOSIS — N28.9 RENAL INSUFFICIENCY: ICD-10-CM

## 2020-08-13 DIAGNOSIS — R10.13 EPIGASTRIC PAIN: ICD-10-CM

## 2020-08-13 DIAGNOSIS — E86.0 DEHYDRATION: ICD-10-CM

## 2020-08-13 DIAGNOSIS — F03.90 DEMENTIA (HCC): ICD-10-CM

## 2020-08-13 DIAGNOSIS — M10.9 GOUT, UNSPECIFIED CAUSE, UNSPECIFIED CHRONICITY, UNSPECIFIED SITE: Primary | ICD-10-CM

## 2020-08-13 DIAGNOSIS — R10.13 DYSPEPSIA: ICD-10-CM

## 2020-08-13 PROBLEM — N18.4 CKD (CHRONIC KIDNEY DISEASE) STAGE 4, GFR 15-29 ML/MIN (HCC): Status: ACTIVE | Noted: 2020-08-13

## 2020-08-13 LAB
ALBUMIN SERPL BCP-MCNC: 3.2 G/DL (ref 3.5–5)
ALP SERPL-CCNC: 65 U/L (ref 46–116)
ALT SERPL W P-5'-P-CCNC: 18 U/L (ref 12–78)
AMORPH URATE CRY URNS QL MICRO: ABNORMAL /HPF
ANION GAP SERPL CALCULATED.3IONS-SCNC: 11 MMOL/L (ref 4–13)
APTT PPP: 29 SECONDS (ref 23–37)
AST SERPL W P-5'-P-CCNC: 27 U/L (ref 5–45)
BACTERIA UR QL AUTO: ABNORMAL /HPF
BASOPHILS # BLD AUTO: 0.05 THOUSANDS/ΜL (ref 0–0.1)
BASOPHILS NFR BLD AUTO: 1 % (ref 0–1)
BILIRUB SERPL-MCNC: 0.35 MG/DL (ref 0.2–1)
BILIRUB UR QL STRIP: NEGATIVE
BUN SERPL-MCNC: 26 MG/DL (ref 5–25)
CALCIUM SERPL-MCNC: 9.5 MG/DL (ref 8.3–10.1)
CHLORIDE SERPL-SCNC: 109 MMOL/L (ref 100–108)
CLARITY UR: CLEAR
CO2 SERPL-SCNC: 24 MMOL/L (ref 21–32)
COLOR UR: YELLOW
CREAT SERPL-MCNC: 2.03 MG/DL (ref 0.6–1.3)
EOSINOPHIL # BLD AUTO: 0.05 THOUSAND/ΜL (ref 0–0.61)
EOSINOPHIL NFR BLD AUTO: 1 % (ref 0–6)
ERYTHROCYTE [DISTWIDTH] IN BLOOD BY AUTOMATED COUNT: 15.2 % (ref 11.6–15.1)
GFR SERPL CREATININE-BSD FRML MDRD: 20 ML/MIN/1.73SQ M
GLUCOSE SERPL-MCNC: 102 MG/DL (ref 65–140)
GLUCOSE UR STRIP-MCNC: NEGATIVE MG/DL
HCT VFR BLD AUTO: 41.4 % (ref 34.8–46.1)
HGB BLD-MCNC: 12.9 G/DL (ref 11.5–15.4)
HGB UR QL STRIP.AUTO: ABNORMAL
HYALINE CASTS #/AREA URNS LPF: ABNORMAL /LPF
IMM GRANULOCYTES # BLD AUTO: 0.02 THOUSAND/UL (ref 0–0.2)
IMM GRANULOCYTES NFR BLD AUTO: 0 % (ref 0–2)
INR PPP: 1.06 (ref 0.84–1.19)
KETONES UR STRIP-MCNC: NEGATIVE MG/DL
LACTATE SERPL-SCNC: 1.1 MMOL/L (ref 0.5–2)
LEUKOCYTE ESTERASE UR QL STRIP: NEGATIVE
LYMPHOCYTES # BLD AUTO: 1.32 THOUSANDS/ΜL (ref 0.6–4.47)
LYMPHOCYTES NFR BLD AUTO: 14 % (ref 14–44)
MAGNESIUM SERPL-MCNC: 1.8 MG/DL (ref 1.6–2.6)
MCH RBC QN AUTO: 29.5 PG (ref 26.8–34.3)
MCHC RBC AUTO-ENTMCNC: 31.2 G/DL (ref 31.4–37.4)
MCV RBC AUTO: 95 FL (ref 82–98)
MONOCYTES # BLD AUTO: 0.97 THOUSAND/ΜL (ref 0.17–1.22)
MONOCYTES NFR BLD AUTO: 11 % (ref 4–12)
MUCOUS THREADS UR QL AUTO: ABNORMAL
NEUTROPHILS # BLD AUTO: 6.86 THOUSANDS/ΜL (ref 1.85–7.62)
NEUTS SEG NFR BLD AUTO: 73 % (ref 43–75)
NITRITE UR QL STRIP: NEGATIVE
NON-SQ EPI CELLS URNS QL MICRO: ABNORMAL /HPF
NRBC BLD AUTO-RTO: 0 /100 WBCS
PH UR STRIP.AUTO: 5.5 [PH] (ref 4.5–8)
PLATELET # BLD AUTO: 211 THOUSANDS/UL (ref 149–390)
PMV BLD AUTO: 11.4 FL (ref 8.9–12.7)
POTASSIUM SERPL-SCNC: 3.8 MMOL/L (ref 3.5–5.3)
PROT SERPL-MCNC: 7.7 G/DL (ref 6.4–8.2)
PROT UR STRIP-MCNC: ABNORMAL MG/DL
PROTHROMBIN TIME: 13.9 SECONDS (ref 11.6–14.5)
RBC # BLD AUTO: 4.38 MILLION/UL (ref 3.81–5.12)
RBC #/AREA URNS AUTO: ABNORMAL /HPF
SODIUM SERPL-SCNC: 144 MMOL/L (ref 136–145)
SP GR UR STRIP.AUTO: 1.02 (ref 1–1.03)
TROPONIN I SERPL-MCNC: <0.02 NG/ML
TSH SERPL DL<=0.05 MIU/L-ACNC: 0.36 UIU/ML (ref 0.36–3.74)
UROBILINOGEN UR QL STRIP.AUTO: 0.2 E.U./DL
WBC # BLD AUTO: 9.27 THOUSAND/UL (ref 4.31–10.16)
WBC #/AREA URNS AUTO: ABNORMAL /HPF

## 2020-08-13 PROCEDURE — 80053 COMPREHEN METABOLIC PANEL: CPT | Performed by: PHYSICIAN ASSISTANT

## 2020-08-13 PROCEDURE — 36415 COLL VENOUS BLD VENIPUNCTURE: CPT | Performed by: PHYSICIAN ASSISTANT

## 2020-08-13 PROCEDURE — G1004 CDSM NDSC: HCPCS

## 2020-08-13 PROCEDURE — 93005 ELECTROCARDIOGRAM TRACING: CPT

## 2020-08-13 PROCEDURE — 84443 ASSAY THYROID STIM HORMONE: CPT | Performed by: PHYSICIAN ASSISTANT

## 2020-08-13 PROCEDURE — 83605 ASSAY OF LACTIC ACID: CPT | Performed by: PHYSICIAN ASSISTANT

## 2020-08-13 PROCEDURE — 96361 HYDRATE IV INFUSION ADD-ON: CPT

## 2020-08-13 PROCEDURE — 74176 CT ABD & PELVIS W/O CONTRAST: CPT

## 2020-08-13 PROCEDURE — 99223 1ST HOSP IP/OBS HIGH 75: CPT | Performed by: NURSE PRACTITIONER

## 2020-08-13 PROCEDURE — 87040 BLOOD CULTURE FOR BACTERIA: CPT | Performed by: PHYSICIAN ASSISTANT

## 2020-08-13 PROCEDURE — 85730 THROMBOPLASTIN TIME PARTIAL: CPT | Performed by: PHYSICIAN ASSISTANT

## 2020-08-13 PROCEDURE — 96360 HYDRATION IV INFUSION INIT: CPT

## 2020-08-13 PROCEDURE — 83735 ASSAY OF MAGNESIUM: CPT | Performed by: PHYSICIAN ASSISTANT

## 2020-08-13 PROCEDURE — 71045 X-RAY EXAM CHEST 1 VIEW: CPT

## 2020-08-13 PROCEDURE — 99285 EMERGENCY DEPT VISIT HI MDM: CPT | Performed by: PHYSICIAN ASSISTANT

## 2020-08-13 PROCEDURE — 85610 PROTHROMBIN TIME: CPT | Performed by: PHYSICIAN ASSISTANT

## 2020-08-13 PROCEDURE — 85025 COMPLETE CBC W/AUTO DIFF WBC: CPT | Performed by: PHYSICIAN ASSISTANT

## 2020-08-13 PROCEDURE — 99285 EMERGENCY DEPT VISIT HI MDM: CPT

## 2020-08-13 PROCEDURE — 84484 ASSAY OF TROPONIN QUANT: CPT | Performed by: PHYSICIAN ASSISTANT

## 2020-08-13 PROCEDURE — 81001 URINALYSIS AUTO W/SCOPE: CPT

## 2020-08-13 RX ORDER — DONEPEZIL HYDROCHLORIDE 5 MG/1
10 TABLET, FILM COATED ORAL
Status: DISCONTINUED | OUTPATIENT
Start: 2020-08-13 | End: 2020-08-18 | Stop reason: HOSPADM

## 2020-08-13 RX ORDER — SODIUM CHLORIDE 9 MG/ML
50 INJECTION, SOLUTION INTRAVENOUS CONTINUOUS
Status: DISCONTINUED | OUTPATIENT
Start: 2020-08-13 | End: 2020-08-17

## 2020-08-13 RX ORDER — PANTOPRAZOLE SODIUM 40 MG/1
40 TABLET, DELAYED RELEASE ORAL
Status: DISCONTINUED | OUTPATIENT
Start: 2020-08-14 | End: 2020-08-14

## 2020-08-13 RX ORDER — ACETAMINOPHEN 325 MG/1
650 TABLET ORAL EVERY 6 HOURS PRN
Status: DISCONTINUED | OUTPATIENT
Start: 2020-08-13 | End: 2020-08-18 | Stop reason: HOSPADM

## 2020-08-13 RX ORDER — FLUOXETINE HYDROCHLORIDE 20 MG/1
40 CAPSULE ORAL DAILY
Status: DISCONTINUED | OUTPATIENT
Start: 2020-08-14 | End: 2020-08-18 | Stop reason: HOSPADM

## 2020-08-13 RX ORDER — FEBUXOSTAT 40 MG/1
TABLET, FILM COATED ORAL
Qty: 90 TABLET | Refills: 0 | Status: SHIPPED | OUTPATIENT
Start: 2020-08-13

## 2020-08-13 RX ORDER — MEMANTINE HYDROCHLORIDE 10 MG/1
10 TABLET ORAL DAILY
Status: DISCONTINUED | OUTPATIENT
Start: 2020-08-14 | End: 2020-08-18 | Stop reason: HOSPADM

## 2020-08-13 RX ORDER — HEPARIN SODIUM 5000 [USP'U]/ML
5000 INJECTION, SOLUTION INTRAVENOUS; SUBCUTANEOUS EVERY 8 HOURS SCHEDULED
Status: DISCONTINUED | OUTPATIENT
Start: 2020-08-13 | End: 2020-08-18 | Stop reason: HOSPADM

## 2020-08-13 RX ORDER — SODIUM CHLORIDE 9 MG/ML
50 INJECTION, SOLUTION INTRAVENOUS CONTINUOUS
Status: DISCONTINUED | OUTPATIENT
Start: 2020-08-13 | End: 2020-08-13

## 2020-08-13 RX ORDER — METHYLPHENIDATE HYDROCHLORIDE 10 MG/1
10 TABLET ORAL
Status: DISCONTINUED | OUTPATIENT
Start: 2020-08-14 | End: 2020-08-16

## 2020-08-13 RX ADMIN — SODIUM CHLORIDE 1000 ML: 0.9 INJECTION, SOLUTION INTRAVENOUS at 17:14

## 2020-08-13 RX ADMIN — SODIUM CHLORIDE 50 ML/HR: 0.9 INJECTION, SOLUTION INTRAVENOUS at 22:56

## 2020-08-13 RX ADMIN — DONEPEZIL HYDROCHLORIDE 10 MG: 5 TABLET, FILM COATED ORAL at 22:30

## 2020-08-13 NOTE — ED PROVIDER NOTES
History  Chief Complaint   Patient presents with    Failure To Thrive     per ems pt lives with family they states pt did not ear or drink for 24 hrs      Patient presents emergency room via squad from home  She resides with her family  She has been increasingly weak over the last 24 hours  She has been refusing to eat or drink  She has a history of dementia and is confused at times  Patient has not been recently ill with any recent illnesses  The family, daughter Dhruv Benavidez denies any history of fever chills, coughing, nasal congestion, postnasal drip, coughing  She denies any history of her complaining of a sore throat  Patient denies any chest or abdominal pain  She denies any shortness of breath  Daughter states that all she drink in the last 24 hours was 1 ulcer and a bottle of soda  Has been history is positive arthritis, breast cancer, hyperlipidemia, hypertension, Raynaud disease, renal disorder  Differential diagnosis includes but is not limited to failure to thrive, sepsis, dehydration, intra-abdominal process, colitis, diverticulitis, ischemic bowel  History provided by:  Patient  Fatigue   Severity:  Moderate  Onset quality:  Gradual  Duration:  1 day  Timing:  Constant  Progression:  Worsening  Chronicity:  New  Context: dehydration    Context: not alcohol use, not allergies, not change in medication, not decreased sleep, not drug use, not increased activity, not pinched nerve, not recent infection, not stress and not urinary tract infection    Relieved by:  None tried  Worsened by:   Activity  Ineffective treatments:  None tried  Associated symptoms: anorexia and lethargy    Associated symptoms: no abdominal pain, no aphasia, no arthralgias, no ataxia, no chest pain, no cough, no diarrhea, no difficulty walking, no dizziness, no drooling, no dysphagia, no dysuria, no numbness in extremities, no falls, no fever, no foul-smelling urine, no frequency, no headaches, no hematochezia, no loss of consciousness, no melena, no myalgias, no nausea, no near-syncope, no sensory-motor deficit, no shortness of breath, no stroke symptoms, no syncope, no urgency, no vision change and no vomiting    Risk factors: no anemia, no congestive heart failure, no coronary artery disease, no diabetes, no excessive menstruation, no family hx of stroke, no heart disease, no neurologic disease, no new medications and no recent stressors        Prior to Admission Medications   Prescriptions Last Dose Informant Patient Reported? Taking? FLUoxetine (PROzac) 40 MG capsule   No Yes   Sig: Take 1 capsule (40 mg total) by mouth daily   donepezil (ARICEPT) 10 mg tablet   Yes Yes   ergocalciferol (VITAMIN D2) 50,000 units   Yes Yes   febuxostat (ULORIC) 40 mg tablet   No Yes   Sig: TAKE ONE TABLET BY MOUTH EVERY DAY   linaCLOtide 145 MCG CAPS   No Yes   Sig: Take 1 capsule (145 mcg total) by mouth daily   memantine (NAMENDA) 10 mg tablet   Yes Yes   methylphenidate (RITALIN) 10 mg tablet   No Yes   Sig: Take 1 tablet (10 mg total) by mouth 2 (two) times a day before breakfast and lunchMax Daily Amount: 20 mg   pantoprazole (PROTONIX) 40 mg tablet   Yes Yes      Facility-Administered Medications: None       Past Medical History:   Diagnosis Date    Arthritis     reynaulds arthritis    Cancer ([facility] [address] )     Hyperlipidemia     Hypertension     Raynaud's disease     Renal disorder     pt states she had kidney disorders long ago, but cant recall the doctor or the problem       Past Surgical History:   Procedure Laterality Date    BREAST LUMPECTOMY  01/01/1970    lumpectomy    BREAST RECONSTRUCTION  01/01/1980    Bilaterally    CHOLECYSTECTOMY      COLONOSCOPY N/A 4/3/2017    Procedure: COLONOSCOPY;  Surgeon: Jimenez Shirley MD;  [facility]: [facility]; Service:     ELBOW SURGERY Right     ESOPHAGOGASTRODUODENOSCOPY N/A 4/3/2017    Procedure: ESOPHAGOGASTRODUODENOSCOPY (EGD);   Surgeon: Jimenez Shirley MD;  [facility]; Service:     FEMUR SURGERY Right     FRACTURE SURGERY      prem to r femur    JOINT REPLACEMENT Right 01/01/2004    knee    JOINT REPLACEMENT Right     hip    OTHER SURGICAL HISTORY  09/2018    Squamous cells removed from nose    REPLACEMENT TOTAL KNEE Right        Family History   Problem Relation Age of Onset    Cancer Mother     Cancer Father     Other Father         prostate disorder     I have reviewed and agree with the history as documented  E-Cigarette/Vaping    E-Cigarette Use Never User      E-Cigarette/Vaping Substances     Social History     Tobacco Use    Smoking status: Never Smoker    Smokeless tobacco: Never Used   Substance Use Topics    Alcohol use: Yes     Comment: occationally    Drug use: No       Review of Systems   Constitutional: Positive for activity change, appetite change and fatigue  Negative for chills, diaphoresis and fever  HENT: Negative for congestion, drooling, ear discharge, ear pain, mouth sores, postnasal drip, rhinorrhea and sore throat  Eyes: Negative for pain, discharge, redness and itching  Respiratory: Negative for cough, chest tightness and shortness of breath  Cardiovascular: Negative for chest pain, syncope and near-syncope  Gastrointestinal: Positive for anorexia  Negative for abdominal pain, diarrhea, dysphagia, hematochezia, melena, nausea and vomiting  Genitourinary: Negative for dysuria, frequency, hematuria and urgency  Musculoskeletal: Negative for arthralgias, falls and myalgias  Skin: Negative for color change, pallor, rash and wound  Neurological: Negative for dizziness, loss of consciousness, weakness and headaches  Psychiatric/Behavioral: Positive for confusion  All other systems reviewed and are negative  Physical Exam  Physical Exam  Vitals signs and nursing note reviewed  Constitutional:       General: She is not in acute distress  Appearance: Normal appearance   She is not ill-appearing, toxic-appearing or diaphoretic  HENT:      Head: Normocephalic and atraumatic  Right Ear: Tympanic membrane and ear canal normal       Left Ear: Tympanic membrane and ear canal normal       Nose: Nose normal       Mouth/Throat:      Pharynx: No oropharyngeal exudate or posterior oropharyngeal erythema  Eyes:      General:         Right eye: No discharge  Left eye: No discharge  Conjunctiva/sclera: Conjunctivae normal    Neck:      Musculoskeletal: No neck rigidity or muscular tenderness  Cardiovascular:      Rate and Rhythm: Normal rate and regular rhythm  Heart sounds: Normal heart sounds  No murmur  Pulmonary:      Effort: Pulmonary effort is normal       Breath sounds: Normal breath sounds  Abdominal:      Palpations: Abdomen is soft  Tenderness: There is no abdominal tenderness  There is no guarding or rebound  Musculoskeletal: Normal range of motion  Lymphadenopathy:      Cervical: No cervical adenopathy  Skin:     General: Skin is warm  Capillary Refill: Capillary refill takes less than 2 seconds  Neurological:      General: No focal deficit present  Mental Status: She is alert  Psychiatric:         Mood and Affect: Mood normal          Behavior: Behavior normal          Thought Content:  Thought content normal          Judgment: Judgment normal          Vital Signs  ED Triage Vitals   Temperature Pulse Respirations Blood Pressure SpO2   08/13/20 1619 08/13/20 1619 08/13/20 1619 08/13/20 1619 08/13/20 1619   98 7 °F (37 1 °C) (!) 41 16 169/70 94 %      Temp Source Heart Rate Source Patient Position - Orthostatic VS BP Location FiO2 (%)   08/13/20 1619 08/13/20 2055 08/13/20 2055 08/13/20 2055 --   Oral Monitor Lying Left arm       Pain Score       08/13/20 1619       No Pain           Vitals:    08/13/20 1619 08/13/20 1838 08/13/20 2055   BP: 169/70 138/65 141/63   Pulse: (!) 41 (!) 44 67   Patient Position - Orthostatic VS:   Lying         Visual Acuity      ED Medications  Medications   sodium chloride 0 9 % bolus 1,000 mL (0 mL Intravenous Stopped 8/13/20 1941)       Diagnostic Studies  Results Reviewed     Procedure Component Value Units Date/Time    Blood culture #2 [[id number]] Collected:  08/13/20 2047    Lab Status: In process Specimen:  Blood from Arm, Right Updated:  08/13/20 2056    Urine Microscopic [[id number]]  (Abnormal) Collected:  08/13/20 1953    Lab Status:  Final result Specimen:  Urine, Other Updated:  08/13/20 2018     RBC, UA 0-1 /hpf      WBC, UA 0-1 /hpf      Epithelial Cells Occasional /hpf      Bacteria, UA Occasional /hpf      Hyaline Casts, UA 20-30 /lpf      AMORPH URATES Moderate /hpf      MUCUS THREADS Occasional    Blood culture #1 [[id number]] Collected:  08/13/20 1659    Lab Status:  Preliminary result Specimen:  Blood from Arm, Right Updated:  08/13/20 2001     Blood Culture Received in Microbiology Lab  Culture in Progress  Urine Macroscopic, POC [[id number]]  (Abnormal) Collected:  08/13/20 1953    Lab Status:  Final result Specimen:  Urine Updated:  08/13/20 1936     Color, UA Yellow     Clarity, UA Clear     pH, UA 5 5     Leukocytes, UA Negative     Nitrite, UA Negative     Protein, UA 30 (1+) mg/dl      Glucose, UA Negative mg/dl      Ketones, UA Negative mg/dl      Urobilinogen, UA 0 2 E U /dl      Bilirubin, UA Negative     Blood, UA Trace     Specific [place], UA 1 025    Narrative:       CLINITEK RESULT    TSH [[id number]]  (Normal) Collected:  08/13/20 1659    Lab Status:  Final result Specimen:  Blood from Arm, Right Updated:  08/13/20 1735     TSH 3RD GENERATON 0 360 uIU/mL     Narrative:       Patients undergoing fluorescein dye angiography may retain small amounts of fluorescein in the body for 48-72 hours post procedure  Samples containing fluorescein can produce falsely depressed TSH values  If the patient had this procedure,a specimen should be resubmitted post fluorescein clearance        Magnesium [[id number]]  (Normal) Collected:  08/13/20 1659    Lab Status:  Final result Specimen:  Blood from Arm, Right Updated:  08/13/20 1735     Magnesium 1 8 mg/dL     Comprehensive metabolic panel [[id number]]  (Abnormal) Collected:  08/13/20 1659    Lab Status:  Final result Specimen:  Blood from Arm, Right Updated:  08/13/20 1731     Sodium 144 mmol/L      Potassium 3 8 mmol/L      Chloride 109 mmol/L      CO2 24 mmol/L      ANION GAP 11 mmol/L      BUN 26 mg/dL      Creatinine 2 03 mg/dL      Glucose 102 mg/dL      Calcium 9 5 mg/dL      AST 27 U/L      ALT 18 U/L      Alkaline Phosphatase 65 U/L      Total Protein 7 7 g/dL      Albumin 3 2 g/dL      Total Bilirubin 0 35 mg/dL      eGFR 20 ml/min/1 73sq m     Narrative:       [place] guidelines for Chronic Kidney Disease (CKD):     Stage 1 with normal or high GFR (GFR > 90 mL/min/1 73 square meters)    Stage 2 Mild CKD (GFR = 60-89 mL/min/1 73 square meters)    Stage 3A Moderate CKD (GFR = 45-59 mL/min/1 73 square meters)    Stage 3B Moderate CKD (GFR = 30-44 mL/min/1 73 square meters)    Stage 4 Severe CKD (GFR = 15-29 mL/min/1 73 square meters)    Stage 5 End Stage CKD (GFR <15 mL/min/1 73 square meters)  Note: GFR calculation is accurate only with a steady state creatinine    Troponin I [[id number]]  (Normal) Collected:  08/13/20 1659    Lab Status:  Final result Specimen:  Blood from Arm, Right Updated:  08/13/20 1731     Troponin I <0 02 ng/mL     Lactic acid [[id number]]  (Normal) Collected:  08/13/20 1659    Lab Status:  Final result Specimen:  Blood from Arm, Right Updated:  08/13/20 1730     LACTIC ACID 1 1 mmol/L     Narrative:       Result may be elevated if tourniquet was used during collection      Jamel De La Paz [[id number]]  (Normal) Collected:  08/13/20 1659    Lab Status:  Final result Specimen:  Blood from Arm, Right Updated:  08/13/20 1720     Protime 13 9 seconds      INR 1 06    APTT [[id number]]  (Normal) Collected:  08/13/20 1659    Lab Status:  Final result Specimen:  Blood from Arm, Right Updated:  08/13/20 1720     PTT 29 seconds     CBC and differential [[id number]]  (Abnormal) Collected:  08/13/20 1659    Lab Status:  Final result Specimen:  Blood from Arm, Right Updated:  08/13/20 1709     WBC 9 27 Thousand/uL      RBC 4 38 Million/uL      Hemoglobin 12 9 g/dL      Hematocrit 41 4 %      MCV 95 fL      MCH 29 5 pg      MCHC 31 2 g/dL      RDW 15 2 %      MPV 11 4 fL      Platelets 237 Thousands/uL      nRBC 0 /100 WBCs      Neutrophils Relative 73 %      Immat GRANS % 0 %      Lymphocytes Relative 14 %      Monocytes Relative 11 %      Eosinophils Relative 1 %      Basophils Relative 1 %      Neutrophils Absolute 6 86 Thousands/µL      Immature Grans Absolute 0 02 Thousand/uL      Lymphocytes Absolute 1 32 Thousands/µL      Monocytes Absolute 0 97 Thousand/µL      Eosinophils Absolute 0 05 Thousand/µL      Basophils Absolute 0 05 Thousands/µL                  CT abdomen pelvis wo contrast   ED Interpretation by Hal Puente PA-C (08/13 1840)   Extensive diverticular changes as discussed above  Imitation given lack of the intravenous and contrast   No evidence for acute inflammatory obstructive abnormalities  Final Result by Jak Murphy MD (08/13 1825)      Extensive diverticular changes as discussed above  Limitations given lack of both intravenous and oral contrast   No evidence for acute inflammatory or obstructive abnormality  Workstation performed: [id number]         XR chest portable   ED Interpretation by Hal Puente PA-C (24/58 0611)   No acute cardiopulmonary disease      Final Result by Jami Islas MD (08/13 1727)      No acute cardiopulmonary disease              Workstation performed: [id number]                    Procedures  ECG 12 Lead Documentation Only    Date/Time: 8/13/2020 5:15 PM  Performed by: Hal Puente PA-C  Authorized by: Hal Puente PA-C     Indications / Diagnosis: Failure to thrive  ECG reviewed by me, the ED Provider: yes    Patient location:  ED  Previous ECG:     Previous ECG:  Compared to current    Comparison ECG info:  4/2/17    Similarity:  Changes noted    Comparison to cardiac monitor: Yes    Interpretation:     Interpretation: abnormal    Rate:     ECG rate:  65    ECG rate assessment: normal    Rhythm:     Rhythm: sinus rhythm    Ectopy:     Ectopy: PAC    QRS:     QRS axis:  Normal    QRS intervals: Wide  Conduction:     Conduction: abnormal      Abnormal conduction: 1st degree      Abnormal conduction comment:  Left anterior fascicular block  ST segments:     ST segments:  Normal  T waves:     T waves: normal    Comments:      Left ventricular hypertrophy  Nonspecific T-wave abnormalities have replaced inverted T-waves in the inferior leads  This could be a new sign of the ischemia  Independently interpreted by me             ED Course  ED Course as of Aug 13 2103   Thu Aug 13, 2020   1704 I spoke to the patient's daughter in regards to the lab work for evaluation of her failure to thrive  She understands that there is certain tests that we need to do to rule out sepsis, weakness, dehydration  I she was concerned that she was not going to be intravenously hydrated  IV fluids have been ordered for the patient  I did explain to her that the chest x-rays to rule out pneumonia as it is 1 of the causes of weakness and sepsis  I also told her that we are going to get a CT scan of her abdomen from the obstipation may show the there was not an acute process going on in her abdomen      1911 Patient bradys down to 41  Plan 12 lead to rule out heart block                              Initial Sepsis Screening     Row Name 08/13/20 2102                Is the patient's history suggestive of a new or worsening infection?           Suspected source of infection  acute abdominal infection  -JG        Are two or more of the following signs & symptoms of infection both present and new to the patient? No  -JG        Indicate SIRS criteria          If the answer is yes to both questions, suspicion of sepsis is present          If severe sepsis is present AND tissue hypoperfusion perists in the hour after fluid resuscitation or lactate > 4, the patient meets criteria for SEPTIC SHOCK          Are any of the following organ dysfunction criteria present within 6 hours of suspected infection and SIRS criteria that are NOT considered to be chronic conditions? No  -JG        Organ dysfunction          Date of presentation of severe sepsis          Time of presentation of severe sepsis          Tissue hypoperfusion persists in the hour after crystalloid fluid administration, evidenced, by either:          Was hypotension present within one hour of the conclusion of crystalloid fluid administration?         Date of presentation of septic shock          Time of presentation of septic shock            User Key  (r) = Recorded By, (t) = Taken By, (c) = Cosigned By    [address] Name Provider Type    [address] Maria Taylor PA-C Physician Assistant                        MDM  Number of Diagnoses or Management Options  Asthenia: new and requires workup  Bradycardia: new and requires workup  Dehydration: new and requires workup  Dementia [facility]): new and requires workup  Failure to thrive (0-17): new and requires workup  Renal insufficiency: new and requires workup     Amount and/or Complexity of Data Reviewed  Clinical lab tests: ordered and reviewed  Tests in the radiology section of CPT®: ordered and reviewed  Tests in the medicine section of CPT®: ordered and reviewed    Risk of Complications, Morbidity, and/or Mortality  Presenting problems: high  Diagnostic procedures: high  Management options: high  General comments: Patient presents emergency room with history of failure to thrive  She was not eating or drinking at home  Her family was concerned that she was dehydrated  She was seen and evaluated  Chest x-ray did not demonstrate any acute cardiopulmonary pathology  A CT scan of her abdomen and pelvis demonstrated diverticulosis but no evidence of diverticulitis although it was a limited study due to the fact that she could not receive contrast secondary to chronic renal insufficiency  She was intravenously hydrated for clinical dehydration  Her laboratory studies were taken and were reviewed  She did have a heart rate that dropped down into the 40s  This may be a cause of her weakness  Patient was admitted to the [facility] service on telemetry for further evaluation and management  After discussing with the family, they elected to make her a full resuscitation  Patient Progress  Patient progress: stable        Disposition  Final diagnoses:   Failure to thrive (0-17)   Bradycardia - Symptomatic   Asthenia   Renal insufficiency   Dehydration   Dementia ([facility] [address] )     Time reflects when diagnosis was documented in both MDM as applicable and the Disposition within this note     Time User Action Codes Description Comment    8/13/2020  8:17 PM Gene Ponce Add [R62 51] Failure to thrive (0-17)     8/13/2020  8:19 PM Suella Ripa [R00 1] Bradycardia     8/13/2020  8:19 PM Rasheed Held [R00 1] Bradycardia Symptomatic    8/13/2020  8:20 PM Suella Ripa [R53 1] Asthenia     8/13/2020  8:20 PM Suella Ripa [N28 9] Renal insufficiency     8/13/2020  8:20 PM Suella Ripa [E86 0] Dehydration     8/13/2020  8:20 PM Gene Ponce Add [F03 90] Dementia [facility])       ED Disposition     ED Disposition Condition Date/Time Comment    Admit Stable Thu Aug 13, 2020  8:19 PM Case was discussed with Dr Catalina Dove and the patient's admission status was agreed to be Admission Status: inpatient status to the service of Dr Catalina Dove           Follow-up Information    None         Patient's Medications   Discharge Prescriptions    No medications on file     No discharge procedures on file      PDMP Review       Value Time User    PDMP Reviewed  Yes 7/13/2020  2:48 PM Pk Conner MD          ED Provider  Electronically Signed by           Emilio Boxer, PA-C  08/13/20 3153

## 2020-08-14 PROBLEM — R10.13 DYSPEPSIA: Status: ACTIVE | Noted: 2020-08-14

## 2020-08-14 PROBLEM — R13.10 DYSPHAGIA: Status: ACTIVE | Noted: 2020-08-14

## 2020-08-14 PROBLEM — I44.0 1ST DEGREE AV BLOCK: Status: ACTIVE | Noted: 2020-08-14

## 2020-08-14 LAB
ANION GAP SERPL CALCULATED.3IONS-SCNC: 14 MMOL/L (ref 4–13)
ATRIAL RATE: 57 BPM
ATRIAL RATE: 93 BPM
BUN SERPL-MCNC: 26 MG/DL (ref 5–25)
CALCIUM SERPL-MCNC: 9.1 MG/DL (ref 8.3–10.1)
CHLORIDE SERPL-SCNC: 112 MMOL/L (ref 100–108)
CO2 SERPL-SCNC: 17 MMOL/L (ref 21–32)
CREAT SERPL-MCNC: 1.8 MG/DL (ref 0.6–1.3)
ERYTHROCYTE [DISTWIDTH] IN BLOOD BY AUTOMATED COUNT: 15.5 % (ref 11.6–15.1)
GFR SERPL CREATININE-BSD FRML MDRD: 23 ML/MIN/1.73SQ M
GLUCOSE SERPL-MCNC: 94 MG/DL (ref 65–140)
HCT VFR BLD AUTO: 40.2 % (ref 34.8–46.1)
HGB BLD-MCNC: 12.4 G/DL (ref 11.5–15.4)
MCH RBC QN AUTO: 29.7 PG (ref 26.8–34.3)
MCHC RBC AUTO-ENTMCNC: 30.8 G/DL (ref 31.4–37.4)
MCV RBC AUTO: 96 FL (ref 82–98)
P AXIS: 33 DEGREES
P AXIS: 38 DEGREES
PLATELET # BLD AUTO: 199 THOUSANDS/UL (ref 149–390)
PMV BLD AUTO: 10.9 FL (ref 8.9–12.7)
POTASSIUM SERPL-SCNC: 4.6 MMOL/L (ref 3.5–5.3)
PR INTERVAL: 216 MS
PR INTERVAL: 232 MS
QRS AXIS: -50 DEGREES
QRS AXIS: -51 DEGREES
QRSD INTERVAL: 102 MS
QRSD INTERVAL: 102 MS
QT INTERVAL: 336 MS
QT INTERVAL: 428 MS
QTC INTERVAL: 349 MS
QTC INTERVAL: 416 MS
RBC # BLD AUTO: 4.17 MILLION/UL (ref 3.81–5.12)
SODIUM SERPL-SCNC: 143 MMOL/L (ref 136–145)
T WAVE AXIS: 102 DEGREES
T WAVE AXIS: 13 DEGREES
VENTRICULAR RATE: 57 BPM
VENTRICULAR RATE: 65 BPM
WBC # BLD AUTO: 11.84 THOUSAND/UL (ref 4.31–10.16)

## 2020-08-14 PROCEDURE — 99222 1ST HOSP IP/OBS MODERATE 55: CPT | Performed by: INTERNAL MEDICINE

## 2020-08-14 PROCEDURE — 80048 BASIC METABOLIC PNL TOTAL CA: CPT | Performed by: NURSE PRACTITIONER

## 2020-08-14 PROCEDURE — 99232 SBSQ HOSP IP/OBS MODERATE 35: CPT | Performed by: INTERNAL MEDICINE

## 2020-08-14 PROCEDURE — 85027 COMPLETE CBC AUTOMATED: CPT | Performed by: NURSE PRACTITIONER

## 2020-08-14 PROCEDURE — 93010 ELECTROCARDIOGRAM REPORT: CPT | Performed by: INTERNAL MEDICINE

## 2020-08-14 PROCEDURE — 97163 PT EVAL HIGH COMPLEX 45 MIN: CPT

## 2020-08-14 RX ORDER — SENNOSIDES 8.6 MG
2 TABLET ORAL
Status: DISCONTINUED | OUTPATIENT
Start: 2020-08-14 | End: 2020-08-14

## 2020-08-14 RX ORDER — PANTOPRAZOLE SODIUM 40 MG/1
40 INJECTION, POWDER, FOR SOLUTION INTRAVENOUS EVERY 12 HOURS SCHEDULED
Status: DISCONTINUED | OUTPATIENT
Start: 2020-08-14 | End: 2020-08-14

## 2020-08-14 RX ORDER — SUCRALFATE ORAL 1 G/10ML
1000 SUSPENSION ORAL EVERY 6 HOURS SCHEDULED
Status: DISCONTINUED | OUTPATIENT
Start: 2020-08-14 | End: 2020-08-18 | Stop reason: HOSPADM

## 2020-08-14 RX ORDER — PANTOPRAZOLE SODIUM 40 MG/1
40 TABLET, DELAYED RELEASE ORAL 2 TIMES DAILY
Status: DISCONTINUED | OUTPATIENT
Start: 2020-08-14 | End: 2020-08-18 | Stop reason: HOSPADM

## 2020-08-14 RX ADMIN — METHYLPHENIDATE HYDROCHLORIDE 10 MG: 10 TABLET ORAL at 12:31

## 2020-08-14 RX ADMIN — PANTOPRAZOLE SODIUM 40 MG: 40 TABLET, DELAYED RELEASE ORAL at 05:22

## 2020-08-14 RX ADMIN — HEPARIN SODIUM 5000 UNITS: 5000 INJECTION INTRAVENOUS; SUBCUTANEOUS at 14:31

## 2020-08-14 RX ADMIN — SUCRALFATE 1000 MG: 1 SUSPENSION ORAL at 17:53

## 2020-08-14 RX ADMIN — PANTOPRAZOLE SODIUM 40 MG: 40 TABLET, DELAYED RELEASE ORAL at 17:53

## 2020-08-14 RX ADMIN — METHYLPHENIDATE HYDROCHLORIDE 10 MG: 10 TABLET ORAL at 06:11

## 2020-08-14 RX ADMIN — HEPARIN SODIUM 5000 UNITS: 5000 INJECTION INTRAVENOUS; SUBCUTANEOUS at 21:26

## 2020-08-14 RX ADMIN — MEMANTINE 10 MG: 10 TABLET ORAL at 08:37

## 2020-08-14 RX ADMIN — SUCRALFATE 1000 MG: 1 SUSPENSION ORAL at 23:58

## 2020-08-14 RX ADMIN — DONEPEZIL HYDROCHLORIDE 10 MG: 5 TABLET, FILM COATED ORAL at 21:26

## 2020-08-14 RX ADMIN — FLUOXETINE 40 MG: 20 CAPSULE ORAL at 08:37

## 2020-08-14 NOTE — ASSESSMENT & PLAN NOTE
· EKG displayed sinus bradycardia with first-degree AV block with PACs, heart rate 57  · Troponin <0 02  · Monitor on telemetry    · Cardiology consult

## 2020-08-14 NOTE — SEPSIS NOTE
Sepsis Note   Sriram Ralph 80 y o  female MRN: [id number]  Unit/Bed#: [facility]ITHA Encounter: [id number]      qSOFA     [address] Name 08/13/20 2055 08/13/20 1838 08/13/20 1619 08/13/20 1610       Altered mental status GCS < 15        1     Respiratory Rate > / =22  0  0  0       Systolic BP < / =595  0  0  0       Q Sofa Score  0  0  0           Initial Sepsis Screening     Row Name 08/13/20 2102                Is the patient's history suggestive of a new or worsening infection?         Suspected source of infection  acute abdominal infection  -JG        Are two or more of the following signs & symptoms of infection both present and new to the patient? No  -JG        Indicate SIRS criteria          If the answer is yes to both questions, suspicion of sepsis is present          If severe sepsis is present AND tissue hypoperfusion perists in the hour after fluid resuscitation or lactate > 4, the patient meets criteria for SEPTIC SHOCK          Are any of the following organ dysfunction criteria present within 6 hours of suspected infection and SIRS criteria that are NOT considered to be chronic conditions? No  -JG        Organ dysfunction          Date of presentation of severe sepsis          Time of presentation of severe sepsis          Tissue hypoperfusion persists in the hour after crystalloid fluid administration, evidenced, by either:          Was hypotension present within one hour of the conclusion of crystalloid fluid administration?           Date of presentation of septic shock          Time of presentation of septic shock            User Key  (r) = Recorded By, (t) = Taken By, (c) = Cosigned By    [address] Name Provider Type    [address] Maria Taylor PA-C Physician Assistant

## 2020-08-14 NOTE — PLAN OF CARE
Problem: Potential for Falls  Goal: Patient will remain free of falls  Description: INTERVENTIONS:  - Assess patient frequently for physical needs  -  Identify cognitive and physical deficits and behaviors that affect risk of falls  -  [place] fall precautions as indicated by assessment   - Educate patient/family on patient safety including physical limitations  - Instruct patient to call for assistance with activity based on assessment  - Modify environment to reduce risk of injury  - Consider OT/PT consult to assist with strengthening/mobility  Outcome: Progressing     Problem: Prexisting or High Potential for Compromised Skin Integrity  Goal: Skin integrity is maintained or improved  Description: INTERVENTIONS:  - Identify patients at risk for skin breakdown  - Assess and monitor skin integrity  - Assess and monitor nutrition and hydration status  - Monitor labs   - Assess for incontinence   - Turn and reposition patient  - Assist with mobility/ambulation  - Relieve pressure over bony prominences  - Avoid friction and shearing  - Provide appropriate hygiene as needed including keeping skin clean and dry  - Evaluate need for skin moisturizer/barrier cream  - Collaborate with interdisciplinary team   - Patient/family teaching  - Consider wound care consult   Outcome: Progressing     Problem: Nutrition/Hydration-ADULT  Goal: Nutrient/Hydration intake appropriate for improving, restoring or maintaining nutritional needs  Description: Monitor and assess patient's nutrition/hydration status for malnutrition  Collaborate with interdisciplinary team and initiate plan and interventions as ordered  Monitor patient's weight and dietary intake as ordered or per policy  Utilize nutrition screening tool and intervene as necessary  Determine patient's food preferences and provide high-protein, high-caloric foods as appropriate       INTERVENTIONS:  - Monitor oral intake, urinary output, labs, and treatment plans  - Assess nutrition and hydration status and recommend course of action  - Evaluate amount of meals eaten  - Assist patient with eating if necessary   - Allow adequate time for meals  - Recommend/ encourage appropriate diets, oral nutritional supplements, and vitamin/mineral supplements  - Order, calculate, and assess calorie counts as needed  - Recommend, monitor, and adjust tube feedings and TPN/PPN based on assessed needs  - Assess need for intravenous fluids  - Provide specific nutrition/hydration education as appropriate  - Include patient/family/caregiver in decisions related to nutrition  Outcome: Progressing     Problem: PAIN - ADULT  Goal: Verbalizes/displays adequate comfort level or baseline comfort level  Description: Interventions:  - Encourage patient to monitor pain and request assistance  - Assess pain using appropriate pain scale  - Administer analgesics based on type and severity of pain and evaluate response  - Implement non-pharmacological measures as appropriate and evaluate response  - Consider cultural and social influences on pain and pain management  - Notify physician/advanced practitioner if interventions unsuccessful or patient reports new pain  Outcome: Progressing     Problem: INFECTION - ADULT  Goal: Absence or prevention of progression during hospitalization  Description: INTERVENTIONS:  - Assess and monitor for signs and symptoms of infection  - Monitor lab/diagnostic results  - Monitor all insertion sites, i e  indwelling lines, tubes, and drains  - Monitor endotracheal if appropriate and nasal secretions for changes in amount and color  - [place] appropriate cooling/warming therapies per order  - Administer medications as ordered  - Instruct and encourage patient and family to use good hand hygiene technique  - Identify and instruct in appropriate isolation precautions for identified infection/condition  Outcome: Progressing  Goal: Absence of fever/infection during neutropenic period  Description: INTERVENTIONS:  - Monitor WBC    Outcome: Progressing     Problem: SAFETY ADULT  Goal: Maintain or return to baseline ADL function  Description: INTERVENTIONS:  -  Assess patient's ability to carry out ADLs; assess patient's baseline for ADL function and identify physical deficits which impact ability to perform ADLs (bathing, care of mouth/teeth, toileting, grooming, dressing, etc )  - Assess/evaluate cause of self-care deficits   - Assess range of motion  - Assess patient's mobility; develop plan if impaired  - Assess patient's need for assistive devices and provide as appropriate  - Encourage maximum independence but intervene and supervise when necessary  - Involve family in performance of ADLs  - Assess for home care needs following discharge   - Consider OT consult to assist with ADL evaluation and planning for discharge  - Provide patient education as appropriate  Outcome: Progressing  Goal: Maintain or return mobility status to optimal level  Description: INTERVENTIONS:  - Assess patient's baseline mobility status (ambulation, transfers, stairs, etc )    - Identify cognitive and physical deficits and behaviors that affect mobility  - Identify mobility aids required to assist with transfers and/or ambulation (gait belt, sit-to-stand, lift, walker, cane, etc )  - [place] fall precautions as indicated by assessment  - Record patient progress and toleration of activity level on Mobility SBAR; progress patient to next Phase/Stage  - Instruct patient to call for assistance with activity based on assessment  - Consider rehabilitation consult to assist with strengthening/weightbearing, etc   Outcome: Progressing     Problem: DISCHARGE PLANNING  Goal: Discharge to home or other facility with appropriate resources  Description: INTERVENTIONS:  - Identify barriers to discharge w/patient and caregiver  - Arrange for needed discharge resources and transportation as appropriate  - Identify discharge learning needs (meds, wound care, etc )  - Arrange for interpretive services to assist at discharge as needed  - Refer to Case Management Department for coordinating discharge planning if the patient needs post-hospital services based on physician/advanced practitioner order or complex needs related to functional status, cognitive ability, or social support system  Outcome: Progressing     Problem: Knowledge Deficit  Goal: Patient/family/caregiver demonstrates understanding of disease process, treatment plan, medications, and discharge instructions  Description: Complete learning assessment and assess knowledge base    Interventions:  - Provide teaching at level of understanding  - Provide teaching via preferred learning methods  Outcome: Progressing     Problem: SKIN/TISSUE INTEGRITY - ADULT  Goal: Skin integrity remains intact  Description: INTERVENTIONS  - Identify patients at risk for skin breakdown  - Assess and monitor skin integrity  - Assess and monitor nutrition and hydration status  - Monitor labs (i e  albumin)  - Assess for incontinence   - Turn and reposition patient  - Assist with mobility/ambulation  - Relieve pressure over bony prominences  - Avoid friction and shearing  - Provide appropriate hygiene as needed including keeping skin clean and dry  - Evaluate need for skin moisturizer/barrier cream  - Collaborate with interdisciplinary team (i e  Nutrition, Rehabilitation, etc )   - Patient/family teaching  Outcome: Progressing     Problem: MUSCULOSKELETAL - ADULT  Goal: Maintain or return mobility to safest level of function  Description: INTERVENTIONS:  - Assess patient's ability to carry out ADLs; assess patient's baseline for ADL function and identify physical deficits which impact ability to perform ADLs (bathing, care of mouth/teeth, toileting, grooming, dressing, etc )  - Assess/evaluate cause of self-care deficits   - Assess range of motion  - Assess patient's mobility  - Assess patient's need for assistive devices and provide as appropriate  - Encourage maximum independence but intervene and supervise when necessary  - Involve family in performance of ADLs  - Assess for home care needs following discharge   - Consider OT consult to assist with ADL evaluation and planning for discharge  - Provide patient education as appropriate  Outcome: Progressing

## 2020-08-14 NOTE — H&P
[address] [facility]    H&P- Meaghan Cee 3/14/1924, 80 y o  female MRN: [id number]    Unit/Bed#: SUNITHA Encounter: [id number]    Primary Care Provider: Brittany Arango MD   Date and time admitted to hospital: 8/13/2020  4:03 PM      * Failure to thrive in adult  Assessment & Plan  · Patient presents from home with family due to patient's decrease in oral intake and generalized weakness  · Nutrition consult  · Supportive care  · PT OT evaluations  · Case management consult  Bradycardia  Assessment & Plan  · EKG displayed sinus bradycardia with first-degree AV block with PACs, heart rate 57  · Troponin <0 02  · Monitor on telemetry  · Cardiology consult     [facility])  Assessment & Plan  · Continue Aricept, Prozac, Ritalin and Namenda  · Supportive care  CKD (chronic kidney disease) stage 4, GFR 15-29 ml/min ([facility])  Assessment & Plan   Creatinine upon admission: 2 03  o Baseline: 1 9-2 0   Monitor BMP  Hypertension  Assessment & Plan   Blood pressure is reviewed and acceptable   o Last recorded pressure: 141/63   Patient is currently not on any antihypertensive medications   Monitor blood pressure  VTE Prophylaxis: Heparin  / reason for no mechanical VTE prophylaxis not indicated   Code Status:  Full  POLST: POLST form is not discussed and not completed at this time  Discussion with family:  Attempted to call daughter, Karin Restrepo, via phone however no answer  Anticipated Length of Stay:  Patient will be admitted on an Inpatient basis with an anticipated length of stay of  > 2 midnights  Justification for Hospital Stay:  PT OT evaluations, case management consult, nutrition consult, Cardiology consult    Total Time for Visit, including Counseling / Coordination of Care: 1 hour  Greater than 50% of this total time spent on direct patient counseling and coordination of care      Chief Complaint:   Decreased oral intake and generalized weakness    History of Present Illness:    Don Lacey Cale Fine is a 80 y o  female with past medical history of dementia, CKD, hypertension, GERD who presents with decreased oral intake and generalized weakness  Patient is a poor historian at baseline due to dementia  History of present illness was limited due to dementia  Spoke to daughter via phone  Her daughter reports that she has no interest in eating or drinking  She reports that she has been able to get 1 ensure and a scoop of ice cream per day  Patient reports no complaints at this time  Review of Systems:    Review of Systems   Unable to perform ROS: Dementia       Past Medical and Surgical History:     Past Medical History:   Diagnosis Date    Arthritis     reynaulds arthritis    Cancer ([facility] [address] )     Hyperlipidemia     Hypertension     Raynaud's disease     Renal disorder     pt states she had kidney disorders long ago, but cant recall the doctor or the problem       Past Surgical History:   Procedure Laterality Date    BREAST LUMPECTOMY  01/01/1970    lumpectomy    BREAST RECONSTRUCTION  01/01/1980    Bilaterally    CHOLECYSTECTOMY      COLONOSCOPY N/A 4/3/2017    Procedure: COLONOSCOPY;  Surgeon: Nelly Sal MD;  [facility]; Service:     ELBOW SURGERY Right     ESOPHAGOGASTRODUODENOSCOPY N/A 4/3/2017    Procedure: ESOPHAGOGASTRODUODENOSCOPY (EGD); Surgeon: Nelly Sal MD;  [facility]; Service:     FEMUR SURGERY Right     FRACTURE SURGERY      prem to r femur    JOINT REPLACEMENT Right 01/01/2004    knee    JOINT REPLACEMENT Right     hip    OTHER SURGICAL HISTORY  09/2018    Squamous cells removed from nose    REPLACEMENT TOTAL KNEE Right        Meds/Allergies:    Prior to Admission medications    Medication Sig Start Date End Date Taking?  Authorizing Provider   donepezil (ARICEPT) 10 mg tablet  4/16/20  Yes Historical Provider, MD   ergocalciferol (VITAMIN D2) 50,000 units  7/6/20  Yes Historical Provider, MD   febuxostat (ULORIC) 40 mg tablet TAKE ONE TABLET BY MOUTH EVERY DAY 8/13/20  Yes Rocky Lopez MD   FLUoxetine (PROzac) 40 MG capsule Take 1 capsule (40 mg total) by mouth daily 7/6/20  Yes Rocky Lopez MD   linaCLOtide 145 MCG CAPS Take 1 capsule (145 mcg total) by mouth daily 7/13/20  Yes Rocky Lopez MD   memantine [facility]) 10 mg tablet  3/13/20  Yes Historical Provider, MD   methylphenidate (RITALIN) 10 mg tablet Take 1 tablet (10 mg total) by mouth 2 (two) times a day before breakfast and lunchMax Daily Amount: 20 mg 7/13/20  Yes Rocky Lopez MD   pantoprazole (PROTONIX) 40 mg tablet  6/5/20  Yes Historical Provider, MD   febuxostat (ULORIC) 40 mg tablet  2/14/20 8/13/20  Historical Provider, MD     I have reviewed home medications with patient family member  Allergies: Allergies   Allergen Reactions    Aspirin      Possible? Unsure of rx          Social History:     Marital Status:    Occupation:  Unknown  Patient Pre-hospital Living Situation:  Private residence of family  Patient Pre-hospital Level of Mobility:  Assistance  Patient Pre-hospital Diet Restrictions:  None  Substance Use History:   Social History     Substance and Sexual Activity   Alcohol Use Yes    Comment: occationally     Social History     Tobacco Use   Smoking Status Never Smoker   Smokeless Tobacco Never Used     Social History     Substance and Sexual Activity   Drug Use No       Family History:    non-contributory    Physical Exam:     Vitals:   Blood Pressure: 141/63 (08/13/20 2055)  Pulse: 67 (08/13/20 2055)  Temperature: 98 7 °F (37 1 °C) (08/13/20 1619)  Temp Source: Oral (08/13/20 1619)  Respirations: 17 (08/13/20 2055)  Weight - Scale: 57 7 kg (127 lb 3 3 oz) (08/13/20 2056)  SpO2: 94 % (08/13/20 2055)    Physical Exam  Vitals signs and nursing note reviewed  Constitutional:       General: She is not in acute distress  HENT:      Head: Normocephalic and atraumatic  Right Ear: Decreased hearing noted  Left Ear: Decreased hearing noted  Eyes:      General: No scleral icterus  Conjunctiva/sclera: Conjunctivae normal    Cardiovascular:      Rate and Rhythm: Regular rhythm  Bradycardia present  Pulses: Normal pulses  Heart sounds: Murmur present  Pulmonary:      Effort: Pulmonary effort is normal  No respiratory distress  Breath sounds: Normal breath sounds  No wheezing or rales  Abdominal:      General: Bowel sounds are normal  There is no distension  Tenderness: There is no abdominal tenderness  Musculoskeletal:         General: No swelling or deformity  Skin:     General: Skin is warm and dry  Capillary Refill: Capillary refill takes 2 to 3 seconds  Findings: No erythema or rash  Neurological:      Mental Status: She is alert  She is disoriented  GCS: GCS eye subscore is 4  GCS verbal subscore is 5  GCS motor subscore is 6  Psychiatric:         Speech: Speech normal              Additional Data:     Lab Results: I have personally reviewed pertinent reports  Results from last 7 days   Lab Units 08/13/20  1659   WBC Thousand/uL 9 27   HEMOGLOBIN g/dL 12 9   HEMATOCRIT % 41 4   PLATELETS Thousands/uL 211   NEUTROS PCT % 73   LYMPHS PCT % 14   MONOS PCT % 11   EOS PCT % 1     Results from last 7 days   Lab Units 08/13/20  1659   SODIUM mmol/L 144   POTASSIUM mmol/L 3 8   CHLORIDE mmol/L 109*   CO2 mmol/L 24   BUN mg/dL 26*   CREATININE mg/dL 2 03*   ANION GAP mmol/L 11   CALCIUM mg/dL 9 5   ALBUMIN g/dL 3 2*   TOTAL BILIRUBIN mg/dL 0 35   ALK PHOS U/L 65   ALT U/L 18   AST U/L 27   GLUCOSE RANDOM mg/dL 102     Results from last 7 days   Lab Units 08/13/20  1659   INR  1 06             Results from last 7 days   Lab Units 08/13/20  1659   LACTIC ACID mmol/L 1 1       Imaging: I have personally reviewed pertinent reports  CT abdomen pelvis wo contrast   ED Interpretation by Amalia Quevedo PA-C (08/13 1840)   Extensive diverticular changes as discussed above    Imitation given lack of the intravenous and contrast   No evidence for acute inflammatory obstructive abnormalities  Final Result by Jenna Milian MD (08/13 3455)      Extensive diverticular changes as discussed above  Limitations given lack of both intravenous and oral contrast   No evidence for acute inflammatory or obstructive abnormality  Workstation performed: [id number]         XR chest portable   ED Interpretation by Ilene Couch PA-C (69/43 [id number])   No acute cardiopulmonary disease      Final Result by Asher Wills MD (08/13 7567)      No acute cardiopulmonary disease  Workstation performed: [id number]             EKG, Pathology, and Other Studies Reviewed on Admission:   · EKG: Sinus bradycardia with first-degree AV block AV block with PACs, heart rate 57  Allscripts / Epic Records Reviewed: Yes     ** Please Note: This note has been constructed using a voice recognition system   **

## 2020-08-14 NOTE — ASSESSMENT & PLAN NOTE
 Blood pressure is reviewed and acceptable   o Last recorded pressure: 141/63   Patient is currently not on any antihypertensive medications   Monitor blood pressure

## 2020-08-14 NOTE — ASSESSMENT & PLAN NOTE
· Patient complains of epigastric discomfort to her children  · She only appears to be consuming Ensure supplements, ice cream, and soda

## 2020-08-14 NOTE — UTILIZATION REVIEW
Initial Clinical Review    Admission: Date/Time/Statement:   Admission Orders (From admission, onward)     Ordered        08/13/20 2021  Inpatient Admission (expected length of stay for this patient Order details is greater than two midnights)  Once                   Orders Placed This Encounter   Procedures    Inpatient Admission (expected length of stay for this patient Order details is greater than two midnights)     Standing Status:   Standing     Number of Occurrences:   1     Order Specific Question:   Admitting Physician     Answer:   Trena Craig [[id number]]     Order Specific Question:   Level of Care     Answer:   Med Surg [[id number]]     Order Specific Question:   Estimated length of stay     Answer:   More than 2 Midnights     Order Specific Question:   Certification     Answer:   I certify that inpatient services are medically necessary for this patient for a duration of greater than two midnights  See H&P and MD Progress Notes for additional information about the patient's course of treatment  ED Arrival Information     Expected Arrival Acuity Means of Arrival Escorted By Service Admission Type    - 8/13/2020 16:03 Emergent Ambulance [facility] EMS Hospitalist Emergency    Arrival Complaint    FAILURE TO THRIVE        Chief Complaint   Patient presents with    Failure To Thrive     per ems pt lives with family they states pt did not ear or drink for 24 hrs      Assessment/Plan: 81 yo female to ED by EMS from home admitted as Inpatient due to decreased oral intake with generalize weakness  PMHx Dementia,  positive arthritis, breast cancer, hyperlipidemia, hypertension, Raynaud disease, renal disorder  Pt is poor historian due to Dementia, hx from daughter on phone  Daughter reports patein is  increasingly weak over last 24 hr refusing to eat or drink  Daughter denies pt with any fever, chills, coughing or sore throat  IN ED: she received IV hydration  Cont gentle IVF    EKG reveals first degree av block with PACs, consult cardiology & maintain telemetry  Monitor BMP  Failure to thrive in adult: Nutrition consult, PT/OT,   8/14/2020 per Cardiology : ecg sinus bradycardia; does not appear new or associated with any compromise  Does not appear symptomatic  Aricept can cause sinus bradycardia- if unlikely to be helpful can DC> Avoid any medications causing sinus bradycardia  8/14/2020 per PT skilled inpt [place] is recommended  8/14/2020 per  Attending: son states pt lost 10 lb over last 6 months  PO intake noticeably decreased to 1 ensure supplement, and ice cream with soda daily  GI consult, nutrition consult, cont IVF, consider DC Ritalin         ED Triage Vitals   Temperature Pulse Respirations Blood Pressure SpO2   08/13/20 1619 08/13/20 1619 08/13/20 1619 08/13/20 1619 08/13/20 1619   98 7 °F (37 1 °C) (!) 41 16 169/70 94 %      Temp Source Heart Rate Source Patient Position - Orthostatic VS BP Location FiO2 (%)   08/13/20 1619 08/13/20 2055 08/13/20 2055 08/13/20 2055 --   Oral Monitor Lying Left arm       Pain Score       08/13/20 1619       No Pain          Wt Readings from Last 1 Encounters:   08/13/20 57 kg (125 lb 10 6 oz)     Additional Vital Signs:   Date/Time   Temp   Pulse   Resp   BP   MAP (mmHg)   SpO2   O2 Device   Patient Position - Orthostatic VS    08/14/20 0815                     None (Room air)       08/14/20 0700   97 6 °F (36 4 °C)   53Abnormal     18   133/73   95   94 %   None (Room air)   Lying    08/13/20 2230                     None (Room air)       08/13/20 2150   98 8 °F (37 1 °C)   60   18   135/65      95 %   None (Room air)   Lying    08/13/20 2055      67   17   141/63      94 %   None (Room air)   Lying    08/13/20 1838      44Abnormal     16   138/65      96 %          08/13/20 1619   98 7 °F (37 1 °C)   41Abnormal     16   169/70      94 %             Weights (last 14 days)     Date/Time   Weight   Weight Method   Height    08/13/20 2150   57 kg (125 lb 10 6 oz) Bed scale   4' 6" (1 372 m)    08/13/20 2056   57 7 kg (127 lb 3 3 oz)              Pertinent Labs/Diagnostic Test Results:       Results from last 7 days   Lab Units 08/14/20  0521 08/13/20  1659   WBC Thousand/uL 11 84* 9 27   HEMOGLOBIN g/dL 12 4 12 9   HEMATOCRIT % 40 2 41 4   PLATELETS Thousands/uL 199 211   NEUTROS ABS Thousands/µL  --  6 86         Results from last 7 days   Lab Units 08/14/20  0521 08/13/20  1659   SODIUM mmol/L 143 144   POTASSIUM mmol/L 4 6 3 8   CHLORIDE mmol/L 112* 109*   CO2 mmol/L 17* 24   ANION GAP mmol/L 14* 11   BUN mg/dL 26* 26*   CREATININE mg/dL 1 80* 2 03*   EGFR ml/min/1 73sq m 23 20   CALCIUM mg/dL 9 1 9 5   MAGNESIUM mg/dL  --  1 8     Results from last 7 days   Lab Units 08/13/20  1659   AST U/L 27   ALT U/L 18   ALK PHOS U/L 65   TOTAL PROTEIN g/dL 7 7   ALBUMIN g/dL 3 2*   TOTAL BILIRUBIN mg/dL 0 35         Results from last 7 days   Lab Units 08/14/20  0521 08/13/20  1659   GLUCOSE RANDOM mg/dL 94 102           Results from last 7 days   Lab Units 08/13/20  1659   TROPONIN I ng/mL <0 02         Results from last 7 days   Lab Units 08/13/20  1659   PROTIME seconds 13 9   INR  1 06   PTT seconds 29     Results from last 7 days   Lab Units 08/13/20  1659   TSH 3RD GENERATON uIU/mL 0 360         Results from last 7 days   Lab Units 08/13/20  1659   LACTIC ACID mmol/L 1 1           Results from last 7 days   Lab Units 08/13/20  1953   CLARITY UA  Clear   COLOR UA  Yellow   SPEC GRAV UA  1 025   PH UA  5 5   GLUCOSE UA mg/dl Negative   KETONES UA mg/dl Negative   BLOOD UA  Trace*   PROTEIN UA mg/dl 30 (1+)*   NITRITE UA  Negative   BILIRUBIN UA  Negative   UROBILINOGEN UA E U /dl 0 2   LEUKOCYTES UA  Negative   WBC UA /hpf 0-1*   RBC UA /hpf 0-1*   BACTERIA UA /hpf Occasional   EPITHELIAL CELLS WET PREP /hpf Occasional   MUCUS THREADS  Occasional*         Results from last 7 days   Lab Units 08/13/20  2047 08/13/20  1659   BLOOD CULTURE  Received in Microbiology Lab   Culture in Progress  Received in Microbiology Lab  Culture in Progress  8/13/2020  CT abdomen pelvis wo contrast   Extensive diverticular changes as discussed above  Imitation given lack of the intravenous and contrast   No evidence for acute inflammatory obstructive abnormalities  Extensive diverticular changes as discussed above  Limitations given lack of both intravenous and oral contrast   No evidence for acute inflammatory or obstructive abnormality  XR chest portable   No acute cardiopulmonary disease       Final Result by Netta Mcgrath MD (08/13 1727)       No acute cardiopulmonary disease       · EKG: Sinus bradycardia with first-degree AV block AV block with PACs, heart rate 57  ED Treatment:   Medication Administration from 08/13/2020 1602 to 08/13/2020 2114       Date/Time Order Dose Route Action     08/13/2020 1714 sodium chloride 0 9 % bolus 1,000 mL 1,000 mL Intravenous New Bag        Past Medical History:   Diagnosis Date    Arthritis     reynaulds arthritis    Cancer ([facility] )     Hyperlipidemia     Hypertension     Raynaud's disease     Renal disorder     pt states she had kidney disorders long ago, but cant recall the doctor or the problem     Present on Admission:   Hypertension      Admitting Diagnosis: Adult failure to thrive [R62 7]  Dehydration [E86 0]  Bradycardia [R00 1]  Asthenia [R53 1]  Renal insufficiency [N28 9]  D[facility] ([facility] ) [F03 90]  Age/Sex: 80 y o  female  Admission Orders:  Telemetry  Up w assist  Fall precautions  Scheduled Medications:  donepezil, 10 mg, Oral, HS  FLUoxetine, 40 mg, Oral, Daily  heparin (porcine), 5,000 Units, Subcutaneous, Q8H RENZO  linaCLOtide, 1 capsule, Oral, Daily  memantine, 10 mg, Oral, Daily  methylphenidate, 10 mg, Oral, BID before breakfast/lunch  pantoprazole, 40 mg, Oral, Early Morning      Continuous IV Infusions:  sodium chloride, 50 mL/hr, Intravenous, Continuous      PRN Meds:  acetaminophen, 650 mg, Oral, Q6H PRN        IP CONSULT TO CASE MANAGEMENT  IP CONSULT TO NUTRITION SERVICES  IP CONSULT TO CARDIOLOGY    Network Utilization Review Department  [email or web address]  ATTENTION: Please call with any questions or concerns to [phone] and carefully listen to the prompts so that you are directed to the right person  All voicemails are confidential   [facility] all requests for admission clinical reviews, approved or denied determinations and any other requests to dedicated fax number below belonging to the campus where the patient is receiving treatment   List of dedicated fax numbers for the Facilities:  [address] DENIALS (Administrative/Medical Necessity) [phone]   [address] (Maternity/NICU/Pediatrics) [phone]   [facility] [phone]   Tania Ocasio [phone]   [facility] [phone]   Patricia Taylor [phone]   [address] [place] [address] [phone]   [facility]  [phone]   [address] [facility] [address]  [address] [address] [address] [facility] [phone]

## 2020-08-14 NOTE — ASSESSMENT & PLAN NOTE
 Creatinine upon admission: 2 03; Baseline: 1 9-2 0 over the last few months   Creatinine 1 8 this morning    Plan:   Monitor BMP

## 2020-08-14 NOTE — ASSESSMENT & PLAN NOTE
· Heart rate in the 40s on arrival to ED  · EKG on admission: sinus bradycardia with first-degree AV block with PACs, heart rate 57  · Troponin <0 02  · Son states that her baseline heart rate is in the 50s to 60s  · Patient does not appear to be symptomatic  · Normotensive and hemodynamically stable  · Sinus bradycardia on telemetry    Plan:  · Patient follows with palliative care as an outpatient  · Consider discontinuing Aricept as it can cause sinus bradycardia

## 2020-08-14 NOTE — ASSESSMENT & PLAN NOTE
· Patient presents from home with family due to patient's decrease in oral intake and generalized weakness  · Nutrition consult  · Supportive care  · PT OT evaluations  · Case management consult

## 2020-08-14 NOTE — ASSESSMENT & PLAN NOTE
· Patient has had decreased oral intake over the last 6 months with approximately 10 lb weight loss over that time  · She has decreased p o   Intake, and son states that she complains of epigastric pain    Plan:  · Protonix 40 mg daily  · GI evaluation

## 2020-08-14 NOTE — PLAN OF CARE
Problem: PHYSICAL THERAPY ADULT  Goal: Performs mobility at highest level of function for planned discharge setting  See evaluation for individualized goals  Description: Treatment/Interventions: Functional transfer training, LE strengthening/ROM, Therapeutic exercise, Endurance training, Cognitive reorientation, Patient/family training, Equipment eval/education, Bed mobility, Gait training, Elevations  Equipment Recommended: Walker       See flowsheet documentation for full assessment, interventions and recommendations  Note: Prognosis: Fair  Problem List: Decreased strength, Decreased endurance, Impaired balance, Decreased mobility, Decreased cognition, Impaired hearing, Obesity  Assessment: Sean Gandara is a 79 y/o Female who [facility] from home w/ c/o decreased appetite, generalized weakness, with a diagnosis of failure to thrive  Received orders for PT eval and treat, with activity order(s) of up w/ A and fall precautions  This patient presents w/ comorbidities of bradycardia, dementia, CKD stage 4, HTN, GERD and has personal factors of advanced age, living in 2 story house, mobilizing w/ assistive device, stair(s) to enter home, decreased cognition, hearing impairments, inability to perform IADLs and inability to perform ADLs  Patient presents with the following impairments at time of evaluation including: weakness, decreased endurance, impaired cognition, impaired balance, gait deviations, decreased safety awareness and fall risk  These impairments are evident in findings from the physical examination weakness, mobility assessment (need for supervision to min assist w/ all phases of mobility when usually mobilizing independently, tolerance to only 2 x 20 feet of ambulation and need for cueing for mobility technique), and objective measure(s) Barthel Index: 40/100  During session, pt needed input for task input and mobility technique   Due to physical and cognition deficits, patient is at an increased risk for falls  This patient's clinical presentation is unstable/unpredictable, which is evident in need for assist w/ all phases of mobility when usually mobilizing independently, tolerance to only 2 x 20 feet of ambulation and need for input for task focus and mobility technique  At this time patient would benefit from skilled inpatient PT in order to address abovementioned deficits in order to improve overall function and mobility  Discharge recommendation is for home w/ family support and home PT pending mobility progress and stairs in order to reduce fall risk and maximize level of functional independence  PT Discharge Recommendation: Home with skilled therapy, Other (Comment)(HHPT)          See flowsheet documentation for full assessment

## 2020-08-14 NOTE — ASSESSMENT & PLAN NOTE
· Patient is on Aricept, Prozac, Ritalin and Namenda    Plan:  · Supportive care  · Continue home medication  · Continue discontinuing Aricept as it can cause bradycardia  · Can consider mirtazapine instead of Prozac pending patient's GI evaluation  · Can considering discontinue Ritalin as that can cause decreased appetite

## 2020-08-14 NOTE — PLAN OF CARE
Problem: Potential for Falls  Goal: Patient will remain free of falls  Description: INTERVENTIONS:  - Assess patient frequently for physical needs  -  Identify cognitive and physical deficits and behaviors that affect risk of falls  -  [place] fall precautions as indicated by assessment   - Educate patient/family on patient safety including physical limitations  - Instruct patient to call for assistance with activity based on assessment  - Modify environment to reduce risk of injury  - Consider OT/PT consult to assist with strengthening/mobility  Outcome: Progressing     Problem: Prexisting or High Potential for Compromised Skin Integrity  Goal: Skin integrity is maintained or improved  Description: INTERVENTIONS:  - Identify patients at risk for skin breakdown  - Assess and monitor skin integrity  - Assess and monitor nutrition and hydration status  - Monitor labs   - Assess for incontinence   - Turn and reposition patient  - Assist with mobility/ambulation  - Relieve pressure over bony prominences  - Avoid friction and shearing  - Provide appropriate hygiene as needed including keeping skin clean and dry  - Evaluate need for skin moisturizer/barrier cream  - Collaborate with interdisciplinary team   - Patient/family teaching  - Consider wound care consult   Outcome: Progressing     Problem: Nutrition/Hydration-ADULT  Goal: Nutrient/Hydration intake appropriate for improving, restoring or maintaining nutritional needs  Description: Monitor and assess patient's nutrition/hydration status for malnutrition  Collaborate with interdisciplinary team and initiate plan and interventions as ordered  Monitor patient's weight and dietary intake as ordered or per policy  Utilize nutrition screening tool and intervene as necessary  Determine patient's food preferences and provide high-protein, high-caloric foods as appropriate       INTERVENTIONS:  - Monitor oral intake, urinary output, labs, and treatment plans  - Assess nutrition and hydration status and recommend course of action  - Evaluate amount of meals eaten  - Assist patient with eating if necessary   - Allow adequate time for meals  - Recommend/ encourage appropriate diets, oral nutritional supplements, and vitamin/mineral supplements  - Order, calculate, and assess calorie counts as needed  - Recommend, monitor, and adjust tube feedings and TPN/PPN based on assessed needs  - Assess need for intravenous fluids  - Provide specific nutrition/hydration education as appropriate  - Include patient/family/caregiver in decisions related to nutrition  Outcome: Progressing     Problem: PAIN - ADULT  Goal: Verbalizes/displays adequate comfort level or baseline comfort level  Description: Interventions:  - Encourage patient to monitor pain and request assistance  - Assess pain using appropriate pain scale  - Administer analgesics based on type and severity of pain and evaluate response  - Implement non-pharmacological measures as appropriate and evaluate response  - Consider cultural and social influences on pain and pain management  - Notify physician/advanced practitioner if interventions unsuccessful or patient reports new pain  Outcome: Progressing     Problem: INFECTION - ADULT  Goal: Absence or prevention of progression during hospitalization  Description: INTERVENTIONS:  - Assess and monitor for signs and symptoms of infection  - Monitor lab/diagnostic results  - Monitor all insertion sites, i e  indwelling lines, tubes, and drains  - Monitor endotracheal if appropriate and nasal secretions for changes in amount and color  - [place] appropriate cooling/warming therapies per order  - Administer medications as ordered  - Instruct and encourage patient and family to use good hand hygiene technique  - Identify and instruct in appropriate isolation precautions for identified infection/condition  Outcome: Progressing  Goal: Absence of fever/infection during neutropenic period  Description: INTERVENTIONS:  - Monitor WBC    Outcome: Progressing     Problem: SAFETY ADULT  Goal: Maintain or return to baseline ADL function  Description: INTERVENTIONS:  -  Assess patient's ability to carry out ADLs; assess patient's baseline for ADL function and identify physical deficits which impact ability to perform ADLs (bathing, care of mouth/teeth, toileting, grooming, dressing, etc )  - Assess/evaluate cause of self-care deficits   - Assess range of motion  - Assess patient's mobility; develop plan if impaired  - Assess patient's need for assistive devices and provide as appropriate  - Encourage maximum independence but intervene and supervise when necessary  - Involve family in performance of ADLs  - Assess for home care needs following discharge   - Consider OT consult to assist with ADL evaluation and planning for discharge  - Provide patient education as appropriate  Outcome: Progressing  Goal: Maintain or return mobility status to optimal level  Description: INTERVENTIONS:  - Assess patient's baseline mobility status (ambulation, transfers, stairs, etc )    - Identify cognitive and physical deficits and behaviors that affect mobility  - Identify mobility aids required to assist with transfers and/or ambulation (gait belt, sit-to-stand, lift, walker, cane, etc )  - [place] fall precautions as indicated by assessment  - Record patient progress and toleration of activity level on Mobility SBAR; progress patient to next Phase/Stage  - Instruct patient to call for assistance with activity based on assessment  - Consider rehabilitation consult to assist with strengthening/weightbearing, etc   Outcome: Progressing     Problem: DISCHARGE PLANNING  Goal: Discharge to home or other facility with appropriate resources  Description: INTERVENTIONS:  - Identify barriers to discharge w/patient and caregiver  - Arrange for needed discharge resources and transportation as appropriate  - Identify discharge learning needs (meds, wound care, etc )  - Arrange for interpretive services to assist at discharge as needed  - Refer to Case Management Department for coordinating discharge planning if the patient needs post-hospital services based on physician/advanced practitioner order or complex needs related to functional status, cognitive ability, or social support system  Outcome: Progressing     Problem: Knowledge Deficit  Goal: Patient/family/caregiver demonstrates understanding of disease process, treatment plan, medications, and discharge instructions  Description: Complete learning assessment and assess knowledge base    Interventions:  - Provide teaching at level of understanding  - Provide teaching via preferred learning methods  Outcome: Progressing     Problem: SKIN/TISSUE INTEGRITY - ADULT  Goal: Skin integrity remains intact  Description: INTERVENTIONS  - Identify patients at risk for skin breakdown  - Assess and monitor skin integrity  - Assess and monitor nutrition and hydration status  - Monitor labs (i e  albumin)  - Assess for incontinence   - Turn and reposition patient  - Assist with mobility/ambulation  - Relieve pressure over bony prominences  - Avoid friction and shearing  - Provide appropriate hygiene as needed including keeping skin clean and dry  - Evaluate need for skin moisturizer/barrier cream  - Collaborate with interdisciplinary team (i e  Nutrition, Rehabilitation, etc )   - Patient/family teaching  Outcome: Progressing     Problem: MUSCULOSKELETAL - ADULT  Goal: Maintain or return mobility to safest level of function  Description: INTERVENTIONS:  - Assess patient's ability to carry out ADLs; assess patient's baseline for ADL function and identify physical deficits which impact ability to perform ADLs (bathing, care of mouth/teeth, toileting, grooming, dressing, etc )  - Assess/evaluate cause of self-care deficits   - Assess range of motion  - Assess patient's mobility  - Assess patient's need for assistive devices and provide as appropriate  - Encourage maximum independence but intervene and supervise when necessary  - Involve family in performance of ADLs  - Assess for home care needs following discharge   - Consider OT consult to assist with ADL evaluation and planning for discharge  - Provide patient education as appropriate  Outcome: Progressing

## 2020-08-14 NOTE — ASSESSMENT & PLAN NOTE
· Patient presents from home with her 2 children who she lives with due to her decrease in oral intake over the last 6 months and generalized weakness  · Her son states that she has lost approximately 10 lb over the last 6 months  · Her p o   Intake has noticeably decreased acutely over the last month, to the point where she is only eating 1 ensure supplement daily and some ice cream and soda    Plan:  · GI evaluation  · Nutrition consult  · Supportive care with IV hydration  · PT/OT evaluations  · Consider discontinuing Ritalin as this can cause decreased appetite

## 2020-08-14 NOTE — PHYSICAL THERAPY NOTE
PHYSICAL THERAPY EVALUATION NOTE    Patient Name: Shamar Michele  PRUPW'P Date: 2020     AGE:   80 y o  Mrn:   [id number]  ADMIT DX:  Adult failure to thrive [R62 7]  Dehydration [E86 0]  Bradycardia [R00 1]  Asthenia [R53 1]  Renal insufficiency [N28 9]  Dementia ([facility] [address] ) [F03 90]    Past Medical History:   Diagnosis Date    Arthritis     reynaulds arthritis    [facility]     Hyperlipidemia     Hypertension     Raynaud's disease     Renal disorder     pt states she had kidney disorders long ago, but cant recall the doctor or the problem     Length Of Stay: 1  PHYSICAL THERAPY EVALUATION :   Patient's identity confirmed via 2 patient identifiers (full name and ) at start of session       20 1252   Note Type   Note type Eval only   Pain Assessment   Pain Assessment Tool Pain Assessment not indicated - pt denies pain   Pain Score No Pain   Home Living   Type of [address] [place] [address] Two level; Able to live on main level with bedroom/bathroom;Stairs to enter with rails  (4 BRIELLE)   Bathroom Shower/Tub Walk-in shower   Bathroom Equipment Built-in shower seat;Grab bars in [facility] 124   Additional Comments Pt is very [place], obtained social hx from combination of son at bedside and chart review  Pt lives a in [address] [place] [address] w/ 4 BRIELLE, pt stays on the first floor  She uses a RW to ambulate   Prior Function   Level of [place] Needs assistance with IADLs   Lives With Family  (Daughter, TAVON)   Receives Help From Family   ADL Assistance Needs assistance   IADLs Needs assistance   Falls in the last 6 months   (son is unsure, pt unable to report)   Restrictions/Precautions   Weight Bearing Precautions Per Order No   Other Precautions Chair Alarm; Bed Alarm;Multiple lines; Fall Risk;Hard of hearing  (Very [place], IV pole)   General   Family/Caregiver Present Yes  (Son (that pt does not live with))   Cognition   Overall Cognitive Status Impaired   Arousal/Participation Responsive   Orientation Level Oriented to person;Disoriented to place; Disoriented to time;Disoriented to situation  (Pt identified by full name and )   Following Commands Follows one step commands inconsistently  (Secondary to [facility])   Comments Pt is incredibly [place], has difficulty following verbal commands secondary to this  She is agreeable to participate in therapy intervention   RLE Assessment   RLE Assessment WFL   Strength RLE   RLE Overall Strength   (at least 3/5 functionally observed)   LLE Assessment   LLE Assessment WFL   Strength LLE   LLE Overall Strength   (at least 3/5 functionally observed)   Bed Mobility   Supine to Sit 5  Supervision   Additional items Assist x 1;HOB elevated   Sit to Supine Unable to assess   Transfers   Sit to Stand 5  Supervision  (Close supervision)   Additional items Assist x 1; Increased time required   Toilet transfer 4  Minimal assistance  (CGA/steadying A)   Additional items Assist x 1; Increased time required;Standard toilet  (+ grab bars)   Additional Comments Session limited secondary to fatigue and pt receiving a facetime call from her family once OOB in chair   Ambulation/Elevation   Gait pattern Decreased foot clearance; Forward Flexion; Short stride   Gait Assistance 5  Supervision  (close supervision)   Additional items Assist x 1   Assistive Device Rolling walker  (Short RW)   Distance 20 ft x 2  (toileting between)   Stair Management Assistance Not tested  (due to fatigue, poor command following)   Balance   Static Sitting Fair +   Static Standing [address] -  (w/ RW)   Endurance Deficit   Endurance Deficit Yes   Endurance Deficit Description Pt visibly SOB after ambulating short distance   Activity Tolerance   Activity Tolerance Patient limited by fatigue   Nurse Made Aware Spoke to RN Stefano Echeverria who states pt is appropriate to participate in therapy intervention   Assessment   Prognosis Fair Problem List Decreased strength;Decreased endurance; Impaired balance;Decreased mobility; Decreased cognition; Impaired hearing;Obesity   Assessment Cora Vaughan is a 81 y/o Female who [facility] from home w/ c/o decreased appetite, generalized weakness, with a diagnosis of failure to thrive  Received orders for PT eval and treat, with activity order(s) of up w/ A and fall precautions  This patient presents w/ comorbidities of bradycardia, dementia, CKD stage 4, HTN, GERD and has personal factors of advanced age, living in 2 story house, mobilizing w/ assistive device, stair(s) to enter home, decreased cognition, hearing impairments, inability to perform IADLs and inability to perform ADLs  Patient presents with the following impairments at time of evaluation including: weakness, decreased endurance, impaired cognition, impaired balance, gait deviations, decreased safety awareness and fall risk  These impairments are evident in findings from the physical examination weakness, mobility assessment (need for supervision to min assist w/ all phases of mobility when usually mobilizing independently, tolerance to only 2 x 20 feet of ambulation and need for cueing for mobility technique), and objective measure(s) Barthel Index: 40/100  During session, pt needed input for task input and mobility technique  Due to physical and cognition deficits, patient is at an increased risk for falls  This patient's clinical presentation is unstable/unpredictable, which is evident in need for assist w/ all phases of mobility when usually mobilizing independently, tolerance to only 2 x 20 feet of ambulation and need for input for task focus and mobility technique  At this time patient would benefit from skilled inpatient PT in order to address abovementioned deficits in order to improve overall function and mobility   Discharge recommendation is for home w/ family support and home PT pending mobility progress and stairs in order to reduce fall risk and maximize level of functional independence  Goals   Patient Goals "i need to go to the bathroom"   [facility] [[email or web address] Expiration Date 08/24/20   Short Term Goal #1 Patient will: Increase bilateral LE strength 1/2 grade to facilitate independent mobility, Perform all bed mobility tasks independently to decrease fall risk factors, Perform all transfers independently to improve independence, Ambulate at least 150 ft  with roller walker modified independent w/o LOB, Navigate 4 stairs w/ supervision with unilateral handrail to facilitate return to previous living environment, Increase all balance 1/2 grade to decrease risk for falls, Complete exercise program independently, Tolerate 3 hr OOB to faciliate upright tolerance and Improve Barthel Index score to 65 or greater to facilitate independence   PT Treatment Day 0   Plan   Treatment/Interventions Functional transfer training;LE strengthening/ROM; Therapeutic exercise; Endurance training;Cognitive reorientation;Patient/family training;Equipment eval/education; Bed mobility;Gait training;Elevations   PT Frequency Other (Comment)  (3-5x/wk)   Recommendation   PT Discharge Recommendation Home with skilled therapy; Other (Comment)  (HHPT)   Equipment Recommended Walker   Additional Comments HHPT pending mobility progress, including ability to perform stairs, and verification that pt is home w/ S and not alone for long time periods during the day   Barthel Index   Feeding 10   Bathing 0   Grooming Score 0   Dressing Score 0   Bladder Score 5   Bowels Score 10   Toilet Use Score 5   Transfers (Bed/Chair) Score 10   Mobility (Level Surface) Score 0   Stairs Score 0   Barthel Index Score 40     Skilled inpatient PT is recommended during this admission in order to progress patient towards goals upon [address], PT

## 2020-08-14 NOTE — CONSULTS
Consultation - GI   Alison Villaseñor 80 y o  female MRN: [id number]  Unit/Bed#: S -01 Encounter: [id number]      Assessment/Plan     1  Failure to thrive, Poor appetite, Colonic thickening on CT AP wo c  In 04/2017 had EGD and colonoscopy done due to BRBPRM  Colon showed Severe diverticulosis throughout  EGD duodenitis without bleeding source  With this, etiology was deemed to be diverticular bleed which spontaneously stabilized  CT AP this admission shows severe diverticulosis burden along with chronic thickening of sigmoid colon and parts of descending colon  Patient's daughter asked her to eat some of the muffin in front of me  Patient was reluctant to eat, but was able to swallow without any difficulty  She had 3 small bites with water - no concern for dysphagia  More concern for reduced appetite  Plan:   · Consider appetite stimulating medications  Patient has tried medical marijuana in the past which has worked but stopped because she didn't enjoy the taste  Daughter is aware of Marinol and megestrol being options  Consider palliative consult  · possible EGD and Colonoscopy in order to assess (?) dyspepsia and CT findings  Need to discuss with attending  Did tell daughter who is the POA to discuss goals of care with brother  At the moment, she does not wish to proceed with surgical intervention but is okay with scopes if needed  · Nutrition team being brought on board  As per daughter, patient will state that she has eaten, but does not actually consume food  Does this at home as well      History of Present Illness   Physician Requesting Consult: Nella Philip MD  Reason for Consult / Principal Problem: Failure to thrive  Hx and PE limited by:   HPI: Alison Villaseñor is a 80y o  year old female with PMH of HLD, HTN, Raynaud's, h/o breast cancer, Dementia, diverticulosis, LGIB, cholecystectomy presented to the ED yesterday due to poor oral intake, reduced appetite, and generalized weakness for the past 6 months which has been worsening for the past month  Eats a scoop of ice cream and 1 ensure a day  As per son, complains of epigastric pain and lost 10lbs in 6 months  No dysphagia, odynophagia, fevers, chills, vomiting as per daughter  For the epigastric pain, patient was prescribed PPI and offered EGD but declined  Daughter states that she starts gagging as soon as she sees food and starts eating  When pressured to eat, she will take food into napkin and hide it, stating she's eaten it  Upper teeth removed, refuses to get dentures  BM habits unknown to daughter, but thinks maybe once a day  Unsure if it's bloody  Linzess prescribed by PCP for "a while," dose was recently was doubled  In ED she was found to have 1st degree AV block with PACs, cardiology team brought on board  Labs today indicate AG metabolic acidosis despite improving creatinine  Blood cultures pending, Urinary culture shows no growth as of yet  Primary team has started protonix 40mg daily and consulted nutrition team  GI team consulted for possible need of EGD as this was declined 1-2 years ago  CT AP yesterday for obstipation which was compared to CT from 2017 showed "diffuse extensive diverticular disease and chronic generalized thickening through the sigmoid colon as well as portions of the acsending colon " Limited study due to lack of contrast      In 04/2017 had EGD and colonoscopy done due to BRBPRM  Colon showed Severe diverticulosis throughout  EGD duodenitis without bleeding source  With this, etiology was deemed to be diverticular bleed which spontaneously stabilized  Daughter states she was prescribed excessive NSAIDs before this happened  Patient is a poor historian, HPI obtained from chart review and daughter over the phone            Inpatient consult to gastroenterology     Performed by  Bryan Muir MD     Authorized by Germain Melo MD              Review of Systems   Unable to perform ROS: Age Historical Information   Past Medical History:   Diagnosis Date    Arthritis     reynaulds arthritis    Cancer ([address] )     Hyperlipidemia     Hypertension     Raynaud's disease     Renal disorder     pt states she had kidney disorders long ago, but cant recall the doctor or the problem     Past Surgical History:   Procedure Laterality Date    BREAST LUMPECTOMY  01/01/1970    lumpectomy    BREAST RECONSTRUCTION  01/01/1980    Bilaterally    CHOLECYSTECTOMY      COLONOSCOPY N/A 4/3/2017    Procedure: COLONOSCOPY;  Surgeon: Laverne Boeck, MD;  [facility]: [facility]; Service:     ELBOW SURGERY Right     ESOPHAGOGASTRODUODENOSCOPY N/A 4/3/2017    Procedure: ESOPHAGOGASTRODUODENOSCOPY (EGD); Surgeon: Laverne Boeck, MD;  [facility]: [facility]; Service:     FEMUR SURGERY Right     FRACTURE SURGERY      prem to r femur    JOINT REPLACEMENT Right 01/01/2004    knee    JOINT REPLACEMENT Right     hip    OTHER SURGICAL HISTORY  09/2018    Squamous cells removed from nose    REPLACEMENT TOTAL KNEE Right      Social History   Social History     Substance and Sexual Activity   Alcohol Use Yes    Comment: occationally     Social History     Substance and Sexual Activity   Drug Use No     E-Cigarette/Vaping    E-Cigarette Use Never User      E-Cigarette/Vaping Substances     Social History     Tobacco Use   Smoking Status Never Smoker   Smokeless Tobacco Never Used     Family History:   Family History   Problem Relation Age of Onset    Cancer Mother     Cancer Father     Other Father         prostate disorder       Meds/Allergies   all current active meds have been reviewed    Allergies   Allergen Reactions    Aspirin      Possible?   Unsure of rx          Objective       Intake/Output Summary (Last 24 hours) at 8/14/2020 2954  Last data filed at 8/13/2020 1941  Gross per 24 hour   Intake 1000 ml   Output    Net 1000 ml       Invasive Devices:   Peripheral IV 08/13/20 Right Antecubital (Active) Site Assessment Clean; Intact;Dry 08/14/20 0815   Dressing Type Transparent 08/14/20 0815   Line Status Flushed; Infusing 08/14/20 0815   Dressing Status Intact;Dry;Clean 08/14/20 0815   Dressing Change Due 08/17/20 08/14/20 0815   Reason Not Rotated Not due 08/14/20 0815       Physical Exam  Vitals signs reviewed  Constitutional:       General: She is not in acute distress  Appearance: She is obese  HENT:      Head: Normocephalic and atraumatic  Ears:      Comments: Reduced hearing  Eyes:      Extraocular Movements: Extraocular movements intact  Cardiovascular:      Rate and Rhythm: Regular rhythm  Bradycardia present  Pulses: Normal pulses  Heart sounds: Normal heart sounds  Pulmonary:      Effort: Pulmonary effort is normal       Breath sounds: Normal breath sounds  Abdominal:      General: Bowel sounds are normal  There is no distension (obese/malnutrition)  Palpations: Abdomen is soft  Tenderness: There is no abdominal tenderness  Musculoskeletal:         General: No swelling or tenderness  Skin:     General: Skin is warm and dry  Coloration: Skin is not jaundiced or pale  Neurological:      General: No focal deficit present  Mental Status: She is alert  Mental status is at baseline  She is disoriented  Psychiatric:         Mood and Affect: Mood normal          Behavior: Behavior normal          Thought Content: Thought content normal          Lab Results: I have personally reviewed pertinent reports  Imaging Studies: I have personally reviewed pertinent reports  EKG, Pathology, and Other Studies: I have personally reviewed pertinent reports  VTE Prophylaxis: Heparin    Counseling / Coordination of Care  Total floor / unit time spent today 45 minutes  Greater than 50% of total time was spent with the patient and / or family counseling and / or coordination of care

## 2020-08-14 NOTE — ASSESSMENT & PLAN NOTE
· Patient has 1st degree heart block with mildly prolonged NH Interval on EKG  · Patient has bradycardia at baseline with heart rates in the 50s to 60s range  · Cardiology was consulted and the patient was on telemetry overnight, telemetry shows sinus bradycardia; family would prefer conservative management and she follows with palliative care as outpatient    Plan:  · Outpatient follow-up  · Can discontinue telemetry

## 2020-08-14 NOTE — ASSESSMENT & PLAN NOTE
 Blood pressures are in acceptable range, patient was hypertensive to 169/70 on admission, but was 135/65 this morning   Patient is currently not on any home antihypertensive medications    Plan:   Monitor blood pressure   Okay with higher blood pressure targets at her age

## 2020-08-14 NOTE — PROGRESS NOTES
Progress Note - Olivia Begin 3/14/1924, 80 y o  female MRN: [id number]    Unit/Bed#: S -01 Encounter: [id number]    Primary Care Provider: Patrick Trevino MD   Date and time admitted to hospital: 8/13/2020  4:03 PM        * Failure to thrive in adult  Assessment & Plan  · Patient presents from home with her 2 children who she lives with due to her decrease in oral intake over the last 6 months and generalized weakness  · Her son states that she has lost approximately 10 lb over the last 6 months  · Her p o  Intake has noticeably decreased acutely over the last month, to the point where she is only eating 1 ensure supplement daily and some ice cream and soda    Plan:  · GI evaluation  · Nutrition consult  · Supportive care with IV hydration  · PT/OT evaluations  · Consider discontinuing Ritalin as this can cause decreased appetite    Dyspepsia  Assessment & Plan  · Patient has had decreased oral intake over the last 6 months with approximately 10 lb weight loss over that time  · She has decreased p o   Intake, and son states that she complains of epigastric pain    Plan:  · Protonix 40 mg daily  · GI evaluation    1st degree AV block  Assessment & Plan  · Patient has 1st degree heart block with mildly prolonged IL Interval on EKG  · Patient has bradycardia at baseline with heart rates in the 50s to 60s range  · Cardiology was consulted and the patient was on telemetry overnight, telemetry shows sinus bradycardia; family would prefer conservative management and she follows with palliative care as outpatient    Plan:  · Outpatient follow-up  · Can discontinue telemetry    Bradycardia  Assessment & Plan  · Heart rate in the 40s on arrival to ED  · EKG on admission: sinus bradycardia with first-degree AV block with PACs, heart rate 57  · Troponin <0 02  · Son states that her baseline heart rate is in the 50s to 60s  · Patient does not appear to be symptomatic  · Normotensive and hemodynamically stable  · Sinus bradycardia on telemetry    Plan:  · Patient follows with palliative care as an outpatient  · Consider discontinuing Aricept as it can cause sinus bradycardia    CKD (chronic kidney disease) stage 4, GFR 15-29 ml/min ([facility])  Assessment & Plan   Creatinine upon admission: 2 03; Baseline: 1 9-2 0 over the last few months   Creatinine 1 8 this morning    Plan:   Monitor BMP    Dementia ([facility])  Assessment & Plan  · Patient is on Aricept, Prozac, Ritalin and Namenda    Plan:  · Supportive care  · Continue home medication  · Continue discontinuing Aricept as it can cause bradycardia  · Can consider mirtazapine instead of Prozac pending patient's GI evaluation  · Can considering discontinue Ritalin as that can cause decreased appetite    Hypertension  Assessment & Plan     Blood pressures are in acceptable range, patient was hypertensive to 169/70 on admission, but was 135/65 this morning   Patient is currently not on any home antihypertensive medications    Plan:   Monitor blood pressure   Okay with higher blood pressure targets at her age          VTE Pharmacologic Prophylaxis:   Pharmacologic: Heparin  Mechanical VTE Prophylaxis in Place: Yes    Discussions with Specialists or Other Care Team Provider:  Discussed case with nursing team, and read consult notes    Education and Discussions with Family / Patient:  Discussed case with patient's son at bedside, will speak with daughter later this afternoon regarding goals of care for further workup    Current Length of Stay: 1 day(s)    Current Patient Status: Inpatient     Discharge Plan / Estimated Discharge Date:  Pending GI evaluation    Code Status: Level 1 - Full Code      Subjective:   No acute overnight events  Patient does not have any complaints this morning, keeps saying I will be fine " She is not oriented to time or place, but she is oriented to person  She denies chest pain and shortness of breath      Objective:     Vitals:   Temp (24hrs), Av 4 °F (36 9 °C), Min:97 6 °F (36 4 °C), Max:98 8 °F (37 1 °C)    Temp:  [97 6 °F (36 4 °C)-98 8 °F (37 1 °C)] 97 6 °F (36 4 °C)  HR:  [41-67] 53  Resp:  [16-18] 18  BP: (133-169)/(63-73) 133/73  SpO2:  [94 %-96 %] 94 %  Body mass index is 30 3 kg/m²  Input and Output Summary (last 24 hours): Intake/Output Summary (Last 24 hours) at 2020 1417  Last data filed at 2020 1941  Gross per 24 hour   Intake 1000 ml   Output    Net 1000 ml       Physical Exam:     Physical Exam  Vitals signs and nursing note reviewed  Constitutional:       General: She is not in acute distress  Appearance: She is normal weight  She is not ill-appearing, toxic-appearing or diaphoretic  HENT:      Head: Normocephalic and atraumatic  Nose: Nose normal    Eyes:      General: No scleral icterus  Neck:      Musculoskeletal: Normal range of motion  No neck rigidity  Cardiovascular:      Rate and Rhythm: Regular rhythm  Bradycardia present  Pulses: Normal pulses  Heart sounds: Murmur present  No friction rub  No gallop  Comments: Systolic murmur  Pulmonary:      Effort: Pulmonary effort is normal  No respiratory distress  Breath sounds: Normal breath sounds  No wheezing or rales  Abdominal:      General: Bowel sounds are normal  There is no distension  Palpations: Abdomen is soft  Tenderness: There is no abdominal tenderness  Musculoskeletal:      Right lower leg: No edema  Left lower leg: No edema  Skin:     General: Skin is warm and dry  Capillary Refill: Capillary refill takes less than 2 seconds  Findings: No bruising  Neurological:      Mental Status: She is alert  Mental status is at baseline        Comments: Patient is oriented to person, not oriented to time or place           Additional Data:     Labs:    Results from last 7 days   Lab Units 20  0521 20  1659   WBC Thousand/uL 11 84* 9 27   HEMOGLOBIN g/dL 12 4 12 9 HEMATOCRIT % 40 2 41 4   PLATELETS Thousands/uL 199 211   NEUTROS PCT %  --  73   LYMPHS PCT %  --  14   MONOS PCT %  --  11   EOS PCT %  --  1     Results from last 7 days   Lab Units 08/14/20  0521 08/13/20  1659   POTASSIUM mmol/L 4 6 3 8   CHLORIDE mmol/L 112* 109*   CO2 mmol/L 17* 24   BUN mg/dL 26* 26*   CREATININE mg/dL 1 80* 2 03*   CALCIUM mg/dL 9 1 9 5   ALK PHOS U/L  --  65   ALT U/L  --  18   AST U/L  --  27     Results from last 7 days   Lab Units 08/13/20  1659   INR  1 06       * I Have Reviewed All Lab Data Listed Above  * Additional Pertinent Lab Tests Reviewed: Susan 66 Admission Reviewed    Imaging:    Imaging Reports Reviewed Today Include:  None  Imaging Personally Reviewed by Myself Includes:  Chest x-ray, CT abdomen pelvis    Recent Cultures (last 7 days):     Results from last 7 days   Lab Units 08/13/20  2047 08/13/20  1659   BLOOD CULTURE  Received in Microbiology Lab  Culture in Progress  Received in Microbiology Lab  Culture in Progress  Last 24 Hours Medication List:   Current Facility-Administered Medications   Medication Dose Route Frequency Provider Last Rate    acetaminophen  650 mg Oral Q6H PRN Claudette Breaker, CRNP      donepezil  10 mg Oral HS Claudette Breaker, CRNP      FLUoxetine  40 mg Oral Daily Claudette Breaker, CRNP      heparin (porcine)  5,000 Units Subcutaneous Q8H [address] Codisameer Childers, CRNP      linaCLOtide  1 capsule Oral Daily Claudette Breaker, CRNP      memantine  10 mg Oral Daily Claudette Breaker, CRNP      methylphenidate  10 mg Oral BID before breakfast/lunch Claudette Breaker, CRNP      pantoprazole  40 mg Oral Early Morning Claudette Breaker, CRNP      sodium chloride  50 mL/hr Intravenous Continuous Claudette Breaker, CRNP 50 mL/hr (08/13/20 5272)        Today, Patient Was Seen By: Jocelynn Morrison MD    ** Please Note: This note has been constructed using a voice recognition system   **

## 2020-08-14 NOTE — CONSULTS
Cardiology   Khloe Fan 80 y o  female MRN: [id number]  Unit/Bed#: S -01 Encounter: [id number]      Reason for Consult / Principal Problem: Bradycardia    Physician Requesting Consult:  Eddie Wilson MD    Cardiologist: N/A    PCP: Dr Timur Barreto  1  Generalized fatigue/weakness  -Likely due to poor oral intakes/dehydration, and failure to thrive--in the setting of advanced Alzheimer's disease  -Low suspicious that symptoms are r/t sinus bradycardia  -On IV fluids for hydration--renal function improving     2  Sinus bradycardia  -Daughter reports, baseline "low resting heart rates"   -12 lead ECG 8/13 19:27:  Sinus bradycardia, with PACs rate 57 bpm  -On PE; HR while sleeping 44-48 bpm, and while awake 56-60 bpm  -24 hour telemetry review:  Non telemetry  -On Aricept/Namena--for hx of Alzheimer's      Plan  -Monitor on tele for 24 hours to assess for high-grade AVB/pauses  -Continue IVFs for hydration   -No further recommendations at this time--discussed with patient's daughter, who would like to be conservative in her medical treatment  -Pt's family in discussion w/Palliative Care as an outpatient      HPI: Khloe Fan 80y o  year old female a medical history of "heart murmur", " report of baseline "low heart rates", Alzheimer's disease, anxiety/depression, and GERD  She does routinely follow with a Cardiologist     She is on no cardiac medications as an outpatient  History obtained from the patient's daughter Audrey Munoz  She presented to the [place] [address] ED on 8/13/2020 with poor oral intake generalized weakness, and fatigue which has been progressively worsening over the past 1-2 weeks  Patient's daughter states that her mother has had a significant decline in her cognitive function over the past year and has been extremely difficult to assist her mother and performance ADLs    She has loss a significant amount of weight secondary to her lack of eating and failure to thrive  She had recently been evaluated at the [place] [address] ED on 7/27/20 with same complaints of poor oral intake, generalized weakness, fatigue, and urinary frequency/urgency  She was ruled out for a urinary tract infection and was discharged home  Patient's daughter states since she was discharged from the hospital, her mother has declined further, and refuses JAMES or drink much of anything  Daughter was very concerned about her lack of oral intake and decided to bring her into the ED for further evaluation  Further workup in the ED  Hemodynamics on admission  NA +98 7, HR 41, R 16, /70, sat 94% on RA  Laboratory data on admission  NA +144, K +3 8, chloride 109, CO2 24, anion gap 11, BUN 26, creatinine 2 03, glucose 102, calcium 9 5, normal LFTs, albumin 3 2, magnesium 1 8, WBC 9 2, HGB 12 9, platelet count 804  Troponin x1: <0 02  Imaging  Chest x-ray:  No acute cardiopulmonary disease    She was initiated on IV fluids for hydration  Cardiology was consulted given that her initial 12 lead ECG demonstrated sinus bradycardia, HR 57, concern for patient being symptomatic in regards to her fatigue and generalized weakness  Family History:   Family History   Problem Relation Age of Onset    Cancer Mother     Cancer Father     Other Father         prostate disorder     Historical Information   Past Medical History:   Diagnosis Date    Arthritis     reynaulds arthritis    Cancer ([facility] [address] )     Hyperlipidemia     Hypertension     Raynaud's disease     Renal disorder     pt states she had kidney disorders long ago, but cant recall the doctor or the problem     Past Surgical History:   Procedure Laterality Date    BREAST LUMPECTOMY  01/01/1970    lumpectomy    BREAST RECONSTRUCTION  01/01/1980    Bilaterally    CHOLECYSTECTOMY      COLONOSCOPY N/A 4/3/2017    Procedure: COLONOSCOPY;  Surgeon: Alysia Boudreaux MD;  [facility]: [facility];   [facility]    [facility]     ESOPHAGOGASTRODUODENOSCOPY N/A 4/3/2017    Procedure: ESOPHAGOGASTRODUODENOSCOPY (EGD); Surgeon: Gerald Saleem MD;  Location: [facility];   Service:    [address] [place] [address] Right     FRACTURE SURGERY      prem to r femur    JOINT REPLACEMENT Right 01/01/2004    knee    JOINT REPLACEMENT Right     hip    OTHER SURGICAL HISTORY  09/2018    Squamous cells removed from nose    REPLACEMENT TOTAL KNEE Right      Social History   Social History     Substance and Sexual Activity   Alcohol Use Yes    Comment: occationally     Social History     Substance and Sexual Activity   Drug Use No     Social History     Tobacco Use   Smoking Status Never Smoker   Smokeless Tobacco Never Used     Family History:   Family History   Problem Relation Age of Onset    Cancer Mother     Cancer Father     Other Father         prostate disorder       Review of Systems:  Review of Systems   Unable to perform ROS: Dementia           Scheduled Meds:  Current Facility-Administered Medications   Medication Dose Route Frequency Provider Last Rate    acetaminophen  650 mg Oral Q6H PRN Johney Armando, CRNP      donepezil  10 mg Oral HS Johney Armando, CRNP      FLUoxetine  40 mg Oral Daily Johney Armando, CRNP      heparin (porcine)  5,000 Units Subcutaneous Q8H [address] Codi [place], CRNP      linaCLOtide  1 capsule Oral Daily Codi Childers, CRNP      memantine  10 mg Oral Daily [place] Armando, CRNP      methylphenidate  10 mg Oral BID before breakfast/lunch Craig Sus, CRNP      pantoprazole  40 mg Oral Early Morning [place] Armando, CRNP      sodium chloride  50 mL/hr Intravenous Continuous Johney Armando, CRNP 50 mL/hr (08/13/20 2256)     Continuous Infusions:sodium chloride, 50 mL/hr, Last Rate: 50 mL/hr (08/13/20 2256)      PRN Meds:   acetaminophen  all current active meds have been reviewed, current meds:   Current Facility-Administered Medications   Medication Dose Route Frequency    acetaminophen (TYLENOL) tablet 650 mg  650 mg Oral Q6H PRN    donepezil (ARICEPT) tablet 10 mg  10 mg Oral HS    FLUoxetine (PROzac) capsule 40 mg  40 mg Oral Daily    heparin (porcine) subcutaneous injection 5,000 Units  5,000 Units Subcutaneous Q8H [address]    linaCLOtide CAPS 145 mcg  1 capsule Oral Daily    memantine (NAMENDA) tablet 10 mg  10 mg Oral Daily    methylphenidate (RITALIN) tablet 10 mg  10 mg Oral BID before breakfast/lunch    pantoprazole (PROTONIX) EC tablet 40 mg  40 mg Oral Early Morning    sodium chloride 0 9 % infusion  50 mL/hr Intravenous Continuous    and PTA meds:   Prior to Admission Medications   Prescriptions Last Dose Informant Patient Reported? Taking? FLUoxetine (PROzac) 40 MG capsule Unknown at Unknown time  No No   Sig: Take 1 capsule (40 mg total) by mouth daily   donepezil (ARICEPT) 10 mg tablet Unknown at Unknown time  Yes No   ergocalciferol (VITAMIN D2) 50,000 units Unknown at Unknown time  Yes No   febuxostat (ULORIC) 40 mg tablet Unknown at Unknown time  No No   Sig: TAKE ONE TABLET BY MOUTH EVERY DAY   linaCLOtide 145 MCG CAPS Unknown at Unknown time  No No   Sig: Take 1 capsule (145 mcg total) by mouth daily   memantine (NAMENDA) 10 mg tablet Unknown at Unknown time  Yes No   methylphenidate (RITALIN) 10 mg tablet Unknown at Unknown time  No No   Sig: Take 1 tablet (10 mg total) by mouth 2 (two) times a day before breakfast and lunchMax Daily Amount: 20 mg   pantoprazole (PROTONIX) 40 mg tablet Unknown at Unknown time  Yes No      Facility-Administered Medications: None       Allergies   Allergen Reactions    Aspirin      Possible? Unsure of rx          Objective   Vitals: Blood pressure 133/73, pulse (!) 53, temperature 97 6 °F (36 4 °C), temperature source Oral, resp  rate 18, height 4' 6" (1 372 m), weight 57 kg (125 lb 10 6 oz), SpO2 94 %  , Body mass index is 30 3 kg/m² ,   Orthostatic Blood Pressures      Most Recent Value   Blood Pressure  133/73 filed at 08/14/2020 0700 Patient Position - Orthostatic VS  Lying filed at 08/14/2020 0700            Intake/Output Summary (Last 24 hours) at 8/14/2020 0919  Last data filed at 8/13/2020 1941  Gross per 24 hour   Intake 1000 ml   Output    Net 1000 ml       Invasive Devices     Peripheral Intravenous Line            Peripheral IV 08/13/20 Right Antecubital less than 1 day                Physical Exam:  Physical Exam  Vitals signs and nursing note reviewed  Constitutional:       General: She is not in acute distress  Appearance: Normal appearance  She is not ill-appearing  HENT:      Head: Normocephalic and atraumatic  Mouth/Throat:      Mouth: Mucous membranes are dry  Eyes:      General: No scleral icterus  Neck:      Musculoskeletal: Normal range of motion and neck supple  Cardiovascular:      Heart sounds: Murmur present  No gallop  Comments: Sinus bradycardia, HR heart rate 56  Pulmonary:      Effort: Pulmonary effort is normal       Breath sounds: Normal breath sounds  Abdominal:      General: Abdomen is flat  Bowel sounds are normal       Palpations: Abdomen is soft  Musculoskeletal:      Right lower leg: No edema  Left lower leg: No edema  Skin:     General: Skin is dry  Capillary Refill: Capillary refill takes less than 2 seconds  Neurological:      General: No focal deficit present  Mental Status: She is alert        Comments: Oriented to person  Disoriented to place, time, and situation   Psychiatric:         Mood and Affect: Mood normal          Lab Results:   Recent Results (from the past 24 hour(s))   CBC and differential    Collection Time: 08/13/20  4:59 PM   Result Value Ref Range    WBC 9 27 4 31 - 10 16 Thousand/uL    RBC 4 38 3 81 - 5 12 Million/uL    Hemoglobin 12 9 11 5 - 15 4 g/dL    Hematocrit 41 4 34 8 - 46 1 %    MCV 95 82 - 98 fL    MCH 29 5 26 8 - 34 3 pg    MCHC 31 2 (L) 31 4 - 37 4 g/dL    RDW 15 2 (H) 11 6 - 15 1 %    MPV 11 4 8 9 - 12 7 fL    Platelets 954 282 - 390 Thousands/uL    nRBC 0 /100 WBCs    Neutrophils Relative 73 43 - 75 %    Immat GRANS % 0 0 - 2 %    Lymphocytes Relative 14 14 - 44 %    Monocytes Relative 11 4 - 12 %    Eosinophils Relative 1 0 - 6 %    Basophils Relative 1 0 - 1 %    Neutrophils Absolute 6 86 1 85 - 7 62 Thousands/µL    Immature Grans Absolute 0 02 0 00 - 0 20 Thousand/uL    Lymphocytes Absolute 1 32 0 60 - 4 47 Thousands/µL    Monocytes Absolute 0 97 0 17 - 1 22 Thousand/µL    Eosinophils Absolute 0 05 0 00 - 0 61 Thousand/µL    Basophils Absolute 0 05 0 00 - 0 10 Thousands/µL   Comprehensive metabolic panel    Collection Time: 08/13/20  4:59 PM   Result Value Ref Range    Sodium 144 136 - 145 mmol/L    Potassium 3 8 3 5 - 5 3 mmol/L    Chloride 109 (H) 100 - 108 mmol/L    CO2 24 21 - 32 mmol/L    ANION GAP 11 4 - 13 mmol/L    BUN 26 (H) 5 - 25 mg/dL    Creatinine 2 03 (H) 0 60 - 1 30 mg/dL    Glucose 102 65 - 140 mg/dL    Calcium 9 5 8 3 - 10 1 mg/dL    AST 27 5 - 45 U/L    ALT 18 12 - 78 U/L    Alkaline Phosphatase 65 46 - 116 U/L    Total Protein 7 7 6 4 - 8 2 g/dL    Albumin 3 2 (L) 3 5 - 5 0 g/dL    Total Bilirubin 0 35 0 20 - 1 00 mg/dL    eGFR 20 ml/min/1 73sq m   Blood culture #1    Collection Time: 08/13/20  4:59 PM    Specimen: Arm, Right; Blood   Result Value Ref Range    Blood Culture Received in Microbiology Lab  Culture in Progress      Protime-INR    Collection Time: 08/13/20  4:59 PM   Result Value Ref Range    Protime 13 9 11 6 - 14 5 seconds    INR 1 06 0 84 - 1 19   APTT    Collection Time: 08/13/20  4:59 PM   Result Value Ref Range    PTT 29 23 - 37 seconds   TSH    Collection Time: 08/13/20  4:59 PM   Result Value Ref Range    TSH 3RD GENERATON 0 360 0 358 - 3 740 uIU/mL   Lactic acid    Collection Time: 08/13/20  4:59 PM   Result Value Ref Range    LACTIC ACID 1 1 0 5 - 2 0 mmol/L   Troponin I    Collection Time: 08/13/20  4:59 PM   Result Value Ref Range    Troponin I <0 02 <=0 04 ng/mL   Magnesium    Collection Time: 08/13/20  4:59 PM   Result Value Ref Range    Magnesium 1 8 1 6 - 2 6 mg/dL   Urine Macroscopic, POC    Collection Time: 08/13/20  7:53 PM   Result Value Ref Range    Color, UA Yellow     Clarity, UA Clear     pH, UA 5 5 4 5 - 8 0    Leukocytes, UA Negative Negative    Nitrite, UA Negative Negative    Protein, UA 30 (1+) (A) Negative mg/dl    Glucose, UA Negative Negative mg/dl    Ketones, UA Negative Negative mg/dl    Urobilinogen, UA 0 2 0 2, 1 0 E U /dl E U /dl    Bilirubin, UA Negative Negative    Blood, UA Trace (A) Negative    Specific Gravity, UA 1 025 1 003 - 1 030   Urine Microscopic    Collection Time: 08/13/20  7:53 PM   Result Value Ref Range    RBC, UA 0-1 (A) None Seen, 0-5 /hpf    WBC, UA 0-1 (A) None Seen, 0-5, 5-55, 5-65 /hpf    Epithelial Cells Occasional None Seen, Occasional /hpf    Bacteria, UA Occasional None Seen, Occasional /hpf    Hyaline Casts, UA 20-30 (A) (none) /lpf    AMORPH URATES Moderate /hpf    MUCUS THREADS Occasional (A) None Seen   Blood culture #2    Collection Time: 08/13/20  8:47 PM    Specimen: Arm, Right; Blood   Result Value Ref Range    Blood Culture Received in Microbiology Lab  Culture in Progress      Basic metabolic panel    Collection Time: 08/14/20  5:21 AM   Result Value Ref Range    Sodium 143 136 - 145 mmol/L    Potassium 4 6 3 5 - 5 3 mmol/L    Chloride 112 (H) 100 - 108 mmol/L    CO2 17 (L) 21 - 32 mmol/L    ANION GAP 14 (H) 4 - 13 mmol/L    BUN 26 (H) 5 - 25 mg/dL    Creatinine 1 80 (H) 0 60 - 1 30 mg/dL    Glucose 94 65 - 140 mg/dL    Calcium 9 1 8 3 - 10 1 mg/dL    eGFR 23 ml/min/1 73sq m   CBC (With Platelets)    Collection Time: 08/14/20  5:21 AM   Result Value Ref Range    WBC 11 84 (H) 4 31 - 10 16 Thousand/uL    RBC 4 17 3 81 - 5 12 Million/uL    Hemoglobin 12 4 11 5 - 15 4 g/dL    Hematocrit 40 2 34 8 - 46 1 %    MCV 96 82 - 98 fL    MCH 29 7 26 8 - 34 3 pg    MCHC 30 8 (L) 31 4 - 37 4 g/dL    RDW 15 5 (H) 11 6 - 15 1 %    Platelets 385 054 - 461 Thousands/uL    MPV 10 9 8 9 - 12 7 fL     Imaging: I have personally reviewed pertinent reports  and I have personally reviewed pertinent films in PACS  Code Status: LVL 1 Full Code  Epic/ Allscripts/Care Everywhere records reviewed: Yes    * Please Note: Fluency DirectDictation voice to text software may have been used in the creation of this document   **

## 2020-08-15 PROBLEM — E83.42 HYPOMAGNESEMIA: Status: ACTIVE | Noted: 2020-08-15

## 2020-08-15 PROBLEM — R10.13 EPIGASTRIC PAIN: Status: ACTIVE | Noted: 2020-08-15

## 2020-08-15 PROBLEM — E87.6 HYPOKALEMIA: Status: ACTIVE | Noted: 2020-08-15

## 2020-08-15 LAB
ANION GAP SERPL CALCULATED.3IONS-SCNC: 10 MMOL/L (ref 4–13)
BUN SERPL-MCNC: 18 MG/DL (ref 5–25)
CALCIUM SERPL-MCNC: 8.1 MG/DL (ref 8.3–10.1)
CHLORIDE SERPL-SCNC: 108 MMOL/L (ref 100–108)
CO2 SERPL-SCNC: 22 MMOL/L (ref 21–32)
CREAT SERPL-MCNC: 1.48 MG/DL (ref 0.6–1.3)
ERYTHROCYTE [DISTWIDTH] IN BLOOD BY AUTOMATED COUNT: 14.9 % (ref 11.6–15.1)
GFR SERPL CREATININE-BSD FRML MDRD: 30 ML/MIN/1.73SQ M
GLUCOSE SERPL-MCNC: 86 MG/DL (ref 65–140)
HCT VFR BLD AUTO: 35.5 % (ref 34.8–46.1)
HGB BLD-MCNC: 11 G/DL (ref 11.5–15.4)
MAGNESIUM SERPL-MCNC: 1.3 MG/DL (ref 1.6–2.6)
MCH RBC QN AUTO: 29.6 PG (ref 26.8–34.3)
MCHC RBC AUTO-ENTMCNC: 31 G/DL (ref 31.4–37.4)
MCV RBC AUTO: 96 FL (ref 82–98)
PLATELET # BLD AUTO: 165 THOUSANDS/UL (ref 149–390)
PMV BLD AUTO: 10.8 FL (ref 8.9–12.7)
POTASSIUM SERPL-SCNC: 3 MMOL/L (ref 3.5–5.3)
RBC # BLD AUTO: 3.71 MILLION/UL (ref 3.81–5.12)
SODIUM SERPL-SCNC: 140 MMOL/L (ref 136–145)
WBC # BLD AUTO: 8.28 THOUSAND/UL (ref 4.31–10.16)

## 2020-08-15 PROCEDURE — 85027 COMPLETE CBC AUTOMATED: CPT | Performed by: INTERNAL MEDICINE

## 2020-08-15 PROCEDURE — 80048 BASIC METABOLIC PNL TOTAL CA: CPT | Performed by: INTERNAL MEDICINE

## 2020-08-15 PROCEDURE — 83735 ASSAY OF MAGNESIUM: CPT | Performed by: PHYSICIAN ASSISTANT

## 2020-08-15 PROCEDURE — 86677 HELICOBACTER PYLORI ANTIBODY: CPT | Performed by: INTERNAL MEDICINE

## 2020-08-15 PROCEDURE — 93005 ELECTROCARDIOGRAM TRACING: CPT

## 2020-08-15 PROCEDURE — 99232 SBSQ HOSP IP/OBS MODERATE 35: CPT | Performed by: INTERNAL MEDICINE

## 2020-08-15 RX ORDER — POTASSIUM CHLORIDE 14.9 MG/ML
20 INJECTION INTRAVENOUS
Status: COMPLETED | OUTPATIENT
Start: 2020-08-15 | End: 2020-08-15

## 2020-08-15 RX ORDER — MAGNESIUM SULFATE HEPTAHYDRATE 40 MG/ML
2 INJECTION, SOLUTION INTRAVENOUS ONCE
Status: COMPLETED | OUTPATIENT
Start: 2020-08-15 | End: 2020-08-15

## 2020-08-15 RX ADMIN — SUCRALFATE 1000 MG: 1 SUSPENSION ORAL at 14:03

## 2020-08-15 RX ADMIN — DONEPEZIL HYDROCHLORIDE 10 MG: 5 TABLET, FILM COATED ORAL at 21:43

## 2020-08-15 RX ADMIN — LUBIPROSTONE 24 MCG: 24 CAPSULE, GELATIN COATED ORAL at 17:51

## 2020-08-15 RX ADMIN — METHYLPHENIDATE HYDROCHLORIDE 10 MG: 10 TABLET ORAL at 14:03

## 2020-08-15 RX ADMIN — PANTOPRAZOLE SODIUM 40 MG: 40 TABLET, DELAYED RELEASE ORAL at 10:17

## 2020-08-15 RX ADMIN — POTASSIUM CHLORIDE 20 MEQ: 14.9 INJECTION, SOLUTION INTRAVENOUS at 14:09

## 2020-08-15 RX ADMIN — POTASSIUM CHLORIDE 20 MEQ: 14.9 INJECTION, SOLUTION INTRAVENOUS at 12:16

## 2020-08-15 RX ADMIN — MEMANTINE 10 MG: 10 TABLET ORAL at 10:17

## 2020-08-15 RX ADMIN — FLUOXETINE 40 MG: 20 CAPSULE ORAL at 10:17

## 2020-08-15 RX ADMIN — MAGNESIUM SULFATE IN WATER 2 G: 40 INJECTION, SOLUTION INTRAVENOUS at 06:32

## 2020-08-15 RX ADMIN — POTASSIUM CHLORIDE 20 MEQ: 14.9 INJECTION, SOLUTION INTRAVENOUS at 08:17

## 2020-08-15 RX ADMIN — SUCRALFATE 1000 MG: 1 SUSPENSION ORAL at 23:01

## 2020-08-15 RX ADMIN — HEPARIN SODIUM 5000 UNITS: 5000 INJECTION INTRAVENOUS; SUBCUTANEOUS at 14:03

## 2020-08-15 RX ADMIN — METHYLPHENIDATE HYDROCHLORIDE 10 MG: 10 TABLET ORAL at 05:34

## 2020-08-15 RX ADMIN — PANTOPRAZOLE SODIUM 40 MG: 40 TABLET, DELAYED RELEASE ORAL at 17:41

## 2020-08-15 RX ADMIN — POTASSIUM CHLORIDE 20 MEQ: 14.9 INJECTION, SOLUTION INTRAVENOUS at 10:17

## 2020-08-15 RX ADMIN — SUCRALFATE 1000 MG: 1 SUSPENSION ORAL at 05:34

## 2020-08-15 RX ADMIN — SODIUM CHLORIDE 50 ML/HR: 0.9 INJECTION, SOLUTION INTRAVENOUS at 17:40

## 2020-08-15 RX ADMIN — HEPARIN SODIUM 5000 UNITS: 5000 INJECTION INTRAVENOUS; SUBCUTANEOUS at 21:43

## 2020-08-15 RX ADMIN — HEPARIN SODIUM 5000 UNITS: 5000 INJECTION INTRAVENOUS; SUBCUTANEOUS at 05:34

## 2020-08-15 NOTE — ASSESSMENT & PLAN NOTE
· Patient has had decreased oral intake over the last 6 months with approximately 10 lb weight loss over that time  · She has decreased p o   Intake, and son states that she complains of epigastric pain    Plan:  · Protonix 40 mg b i d   · Follow-up H pylori serology  · Carafate 4 times daily  · NPO at midnight tonight for possible EGD tomorrow

## 2020-08-15 NOTE — ASSESSMENT & PLAN NOTE
· Heart rate in the 40s on arrival to ED  · EKG on admission: sinus bradycardia with first-degree AV block with PACs, heart rate 57  · Troponin <0 02  · Son states that her baseline heart rate is in the 50s to 60s  · Patient does not appear to be symptomatic  · Normotensive and hemodynamically stable  · Sinus bradycardia a 40/PVCs overnight on telemetry  ECG done which showed first-degree heart block with QTC over 530  Magnesium replaced at 2 g/2 hours IV  Repeat ECG requested  On 08/15/2020  Plan:  · Patient follows with palliative care as an outpatient  · Consider discontinuing Aricept as it can cause sinus bradycardia  · pending the ECG

## 2020-08-15 NOTE — ASSESSMENT & PLAN NOTE
· Patient is on Aricept, Prozac, Ritalin and Namenda    Plan:  · Supportive care  · Continue home medication  · Continue discontinuing Aricept as it can cause bradycardia  · Can consider mirtazapine instead of Prozac pending patient's GI evaluation  · Can considering discontinue Ritalin with taper as Ritalin can cause appetite suppression

## 2020-08-15 NOTE — ASSESSMENT & PLAN NOTE
· Patient presents from home with her 2 children who she lives with due to her decrease in oral intake over the last 6 months and generalized weakness  · Her son states that she has lost approximately 10 lb over the last 6 months  · Her p o  Intake has noticeably decreased acutely over the last month, to the point where she is only eating 1 ensure supplement daily and some ice cream and soda    Plan:  · GI consulted  See a epigastric pain  · Nutrition consult pending  · Supportive care with IV hydration  · Consider discontinuing Ritalin as this can cause decreased appetite  · PT/OT evaluations- suggested skilled inpatient PT  Home PT pending mobility progress and stairs in order to decrease fall risk and maximize level of functional independence  Plan to discharge discharge to home with skilled therapy with walker

## 2020-08-15 NOTE — ASSESSMENT & PLAN NOTE
· K 3 0 on 08/15/2020  · Mg decreased to 1 3 on 08/15/2020  · 80 mEq potassium total given on 08/15/2020    Plan  · Follow-up BMP today

## 2020-08-15 NOTE — ASSESSMENT & PLAN NOTE
· Blood pressures are in acceptable range, patient was hypertensive to 169/70 on admission, but was 151/70 this morning   Patient is currently not on any home antihypertensive medications    Plan:   Monitor blood pressure   Okay with higher blood pressure targets at her age

## 2020-08-15 NOTE — ASSESSMENT & PLAN NOTE
· Mg 1 3 on 08/15/2020  · Magnesium replaced on 08/15/2020    Plan  · Continue to monitor magnesium levels

## 2020-08-15 NOTE — ASSESSMENT & PLAN NOTE
· Patient has 1st degree heart block with mildly prolonged [place] Interval on EKG  · Patient has bradycardia at baseline with heart rates in the 50s to 60s range  · Cardiology was consulted and the patient was on telemetry overnight, telemetry shows sinus bradycardia; family would prefer conservative management and she follows with palliative care as outpatient    Plan:  · Outpatient follow-up  · Continue telemetry

## 2020-08-15 NOTE — ASSESSMENT & PLAN NOTE
Over the last 6 months with approximately 10 lb weight loss over that time  She has decreased p o  Intake, and son states that she complains of epigastric pain  GI consulted for failure to thrive and epigastric pain  Assessment: GI suspects  anorexia and poor appetite most likely related to advanced dementia    Plan  -checked H pylori serology with morning labs  -recommended calorie count  -considering stopping Ritalin as this is an appetite suppressant (they will need to discuss with family about why patient is on this medication)  -GI considerimg appetite stimulating medication if appetite does not improve off of Ritalin  -GI can offer patient EGD to assess poor appetite and epigastric pain if she would like  -GI to start Ensure t i d    On Protonix 40 mg daily

## 2020-08-15 NOTE — ASSESSMENT & PLAN NOTE
· Patient has 1st degree heart block with mildly prolonged [place] Interval on EKG  · Patient has bradycardia at baseline with heart rates in the 50s to 60s range  · Cardiology was consulted and the patient was on telemetry on her 1st night in the hospital which showed  sinus bradycardia; family would prefer conservative management and she follows with palliative care as outpatient    Plan:  · Outpatient follow-up

## 2020-08-15 NOTE — ASSESSMENT & PLAN NOTE
· Heart rate in the 40s on arrival to ED  · EKG on admission: sinus bradycardia with first-degree AV block with PACs, heart rate 57  · Troponin <0 02  · Son states that her baseline heart rate is in the 50s to 60s  · Patient does not appear to be symptomatic  · Normotensive and hemodynamically stable  · Sinus bradycardia a 40/PVCs overnight on telemetry  ECG done which showed first-degree heart block with QTC over 530   Magnesium replaced at 2 g/2 hours IV  · Repeat EKG on 08/15/2020 shows QTC improved to 349    Plan:  · Patient follows with palliative care as an outpatient  · Consider discontinuing Aricept as it can cause sinus bradycardia

## 2020-08-15 NOTE — PROGRESS NOTES
Progress Note - Kalpana Caputo 3/14/1924, 80 y o  female MRN: [id number]    Unit/Bed#: S -01 Encounter: [id number]    Primary Care Provider: Ban Morocho MD   Date and time admitted to hospital: 8/13/2020  4:03 PM        * Failure to thrive in adult  Assessment & Plan  · Patient presents from home with her 2 children who she lives with due to her decrease in oral intake over the last 6 months and generalized weakness  · Her son states that she has lost approximately 10 lb over the last 6 months  · Her p o  Intake has noticeably decreased acutely over the last month, to the point where she is only eating 1 ensure supplement daily and some ice cream and soda    Plan:  · GI consulted  See a epigastric pain  · Nutrition consult pending  · Supportive care with IV hydration  · Consider discontinuing Ritalin as this can cause decreased appetite  · PT/OT evaluations- suggested skilled inpatient PT  Home PT pending mobility progress and stairs in order to decrease fall risk and maximize level of functional independence  Plan to discharge discharge to home with skilled therapy with walker  Epigastric pain  Assessment & Plan  Over the last 6 months with approximately 10 lb weight loss over that time  She has decreased p o  Intake, and son states that she complains of epigastric pain  GI consulted for failure to thrive and epigastric pain  Assessment: GI suspects  anorexia and poor appetite most likely related to advanced dementia    Plan  -checked H pylori serology with morning labs  -recommended calorie count  -considering stopping Ritalin as this is an appetite suppressant (they will need to discuss with family about why patient is on this medication)  -GI considerimg appetite stimulating medication if appetite does not improve off of Ritalin  -GI can offer patient EGD to assess poor appetite and epigastric pain if she would like  -GI to start Ensure t i d    On Protonix 40 mg daily    1st degree AV block  Assessment & Plan  · Patient has 1st degree heart block with mildly prolonged [place] Interval on EKG  · Patient has bradycardia at baseline with heart rates in the 50s to 60s range  · Cardiology was consulted and the patient was on telemetry overnight, telemetry shows sinus bradycardia; family would prefer conservative management and she follows with palliative care as outpatient    Plan:  · Outpatient follow-up  · Continue telemetry  Bradycardia  Assessment & Plan  · Heart rate in the 40s on arrival to ED  · EKG on admission: sinus bradycardia with first-degree AV block with PACs, heart rate 57  · Troponin <0 02  · Son states that her baseline heart rate is in the 50s to 60s  · Patient does not appear to be symptomatic  · Normotensive and hemodynamically stable  · Sinus bradycardia a 40/PVCs overnight on telemetry  ECG done which showed first-degree heart block with QTC over 530  Magnesium replaced at 2 g/2 hours IV  Repeat ECG requested  On 08/15/2020  Plan:  · Patient follows with palliative care as an outpatient  · Consider discontinuing Aricept as it can cause sinus bradycardia  · pending the ECG       CKD (chronic kidney disease) stage 4, GFR 15-29 ml/min ([facility])  Assessment & Plan   Creatinine upon admission: 2 03; Baseline: 1 9-2 0 over the last few months     Recent Labs     08/13/20  1659 08/14/20  0521 08/15/20  0531   CREATININE 2 03* 1 80* 1 48*         Plan:   Monitor BMP    Hypertension  Assessment & Plan     Blood pressures are in acceptable range, patient was hypertensive to 169/70 on admission, but was 135/65 this morning   Patient is currently not on any home antihypertensive medications    Plan:   Monitor blood pressure   Okay with higher blood pressure targets at her age    Dementia [facility])  Assessment & Plan  · Patient is on Aricept, Prozac, Ritalin and Namenda    Plan:  · Supportive care  · Continue home medication  · Continue discontinuing Aricept as it can cause bradycardia  · Can consider mirtazapine instead of Prozac pending patient's GI evaluation  · Can considering discontinue Ritalin as that can cause decreased appetite    Dyspepsia  Assessment & Plan  · Patient has had decreased oral intake over the last 6 months with approximately 10 lb weight loss over that time  · She has decreased p o  Intake, and son states that she complains of epigastric pain    Plan:  · Protonix 40 mg daily  · GI evaluation    Hypomagnesemia  Assessment & Plan  Mg 1 3     Plan  ·  Magnesium is being replaced at 2 g IV q2hr  Hypokalemia  Assessment & Plan  K 3 0     Plan  · K-Dur 40 meq IV at 7 am   · K-Dur 40 at 11 am         VTE Pharmacologic Prophylaxis:   Pharmacologic: Heparin  Mechanical VTE Prophylaxis in Place: No    Discussions with Specialists or Other Care Team Provider:  GI, PT OT    Education and Discussions with Family / Patient:  Called daughter and provided update  All questions answered  Current Length of Stay: 2 day(s)    Current Patient Status: Inpatient     Discharge Plan / Estimated Discharge Date:  N/a    Code Status: Level 1 - Full Code      Subjective:    Patient is hard of hearing  Difficult to elicit symptoms  Is able to follow simple commands when she is able to hear  Objective:     Vitals:   Temp (24hrs), Av °F (36 7 °C), Min:97 9 °F (36 6 °C), Max:98 1 °F (36 7 °C)    Temp:  [97 9 °F (36 6 °C)-98 1 °F (36 7 °C)] 97 9 °F (36 6 °C)  HR:  [43-52] 43  Resp:  [16-18] 16  BP: (129-157)/(63-67) 129/67  SpO2:  [93 %-97 %] 93 %  Body mass index is 30 3 kg/m²  Input and Output Summary (last 24 hours):        Intake/Output Summary (Last 24 hours) at 8/15/2020 1245  Last data filed at 8/15/2020 0641  Gross per 24 hour   Intake    Output 150 ml   Net -150 ml       Physical Exam:     Physical Exam  HENT:      Right Ear: External ear normal       Left Ear: External ear normal       Nose: Nose normal    Eyes:      Conjunctiva/sclera: Conjunctivae normal  Cardiovascular:      Comments: Harsh murmur- seems holosystolic  Abdominal:      Comments: Hepatomegaly   Musculoskeletal: Normal range of motion  Skin:     General: Skin is warm  Neurological:      Mental Status: She is alert  Comments: Disoriented x2 oriented to person   Psychiatric:         Attention and Perception: Attention normal          Mood and Affect: Mood normal          Behavior: Behavior is cooperative  Additional Data:     Labs:    Results from last 7 days   Lab Units 08/15/20  0531  08/13/20  1659   WBC Thousand/uL 8 28   < > 9 27   HEMOGLOBIN g/dL 11 0*   < > 12 9   HEMATOCRIT % 35 5   < > 41 4   PLATELETS Thousands/uL 165   < > 211   NEUTROS PCT %  --   --  73   LYMPHS PCT %  --   --  14   MONOS PCT %  --   --  11   EOS PCT %  --   --  1    < > = values in this interval not displayed  Results from last 7 days   Lab Units 08/15/20  0531  08/13/20  1659   POTASSIUM mmol/L 3 0*   < > 3 8   CHLORIDE mmol/L 108   < > 109*   CO2 mmol/L 22   < > 24   BUN mg/dL 18   < > 26*   CREATININE mg/dL 1 48*   < > 2 03*   CALCIUM mg/dL 8 1*   < > 9 5   ALK PHOS U/L  --   --  65   ALT U/L  --   --  18   AST U/L  --   --  27    < > = values in this interval not displayed  Results from last 7 days   Lab Units 08/13/20  1659   INR  1 06       * I Have Reviewed All Lab Data Listed Above  * Additional Pertinent Lab Tests Reviewed: All Labs Within Last 24 Hours Reviewed    Imaging:    Imaging Reports Reviewed Today Include:  CT abdomen  Imaging Personally Reviewed by Myself Includes:  Stable    Recent Cultures (last 7 days):     Results from last 7 days   Lab Units 08/13/20 2047 08/13/20  1659   BLOOD CULTURE  No Growth at 24 hrs  No Growth at 24 hrs         Last 24 Hours Medication List:   Current Facility-Administered Medications   Medication Dose Route Frequency Provider Last Rate    acetaminophen  650 mg Oral Q6H PRN ALEA Hays      donepezil  10 mg Oral HS Codi Mandi, ALEA      FLUoxetine  40 mg Oral Daily Claudia Simons, CRNP      heparin (porcine)  5,000 Units Subcutaneous [facility] Claudia Simons, CRCHARLY      linaCLOtide  1 capsule Oral Daily Claudia Simons, CRNP      memantine  10 mg Oral Daily Claudia Simons, CRNP      methylphenidate  10 mg Oral BID before breakfast/lunch Claudia Simons, CRNP      pantoprazole  40 mg Oral BID Keren Marino MD      potassium chloride  20 mEq Intravenous Q2H Adrien Mckeon MD 20 mEq (08/15/20 1216)    sodium chloride  50 mL/hr Intravenous Continuous ALEA Xie 50 mL/hr (08/13/20 2256)    sucralfate  1,000 mg Oral Q6H Luisito Best MD          Today, Patient Was Seen By: Adrien Mckeon MD    ** Please Note: This note has been constructed using a voice recognition system   **

## 2020-08-15 NOTE — ASSESSMENT & PLAN NOTE
 Creatinine upon admission: 2 03; Baseline: 1 9-2 0 over the last few months    Recent Labs     08/13/20  1659 08/14/20  0521 08/15/20  0531   CREATININE 2 03* 1 80* 1 48*         Plan:   Continue to monitor renal function   Gentle IV hydration at 50 mL/hour with decreased p o   Intake

## 2020-08-15 NOTE — ASSESSMENT & PLAN NOTE
· Over the last 6 months with approximately 10 lb weight loss over that time  · She has decreased p o  Intake, and son states that she complains of epigastric pain  · GI consulted for failure to thrive and epigastric pain  · Assessment: GI suspects  anorexia and poor appetite most likely related to advanced dementia    Plan  · Follow-up H pylori serology  · Calorie count  · Consider discontinuing Ritalin with taper, as Ritalin can cause appetite suppression  · NPO at midnight for possible EGD tomorrow  · Consider appetite stimulant pending GI  · Ensure supplements t i d   · Protonix 40 mg b i d    · Carafate 4 times daily

## 2020-08-15 NOTE — ASSESSMENT & PLAN NOTE
· Patient presents from home with her 2 children who she lives with due to her decrease in oral intake over the last 6 months and generalized weakness  · Her son states that she has lost approximately 10 lb over the last 6 months  · Her p o  Intake has noticeably decreased acutely over the last month, to the point where she is only eating 1 ensure supplement daily and some ice cream and soda    Plan:  · GI consulted  See epigastric pain  · Nutrition consult pending  · Supportive care with IV hydration  · Consider discontinuing Ritalin as this can cause decreased appetite; will require Ritalin taper  · PT/OT evaluations- suggested skilled inpatient PT  Home PT pending mobility progress and stairs in order to decrease fall risk and maximize level of functional independence  Plan to discharge discharge to home with skilled therapy with walker    Awaiting OT evaluation

## 2020-08-16 LAB
ANION GAP SERPL CALCULATED.3IONS-SCNC: 12 MMOL/L (ref 4–13)
BUN SERPL-MCNC: 13 MG/DL (ref 5–25)
CALCIUM SERPL-MCNC: 8.7 MG/DL (ref 8.3–10.1)
CHLORIDE SERPL-SCNC: 107 MMOL/L (ref 100–108)
CO2 SERPL-SCNC: 20 MMOL/L (ref 21–32)
CREAT SERPL-MCNC: 1.48 MG/DL (ref 0.6–1.3)
ERYTHROCYTE [DISTWIDTH] IN BLOOD BY AUTOMATED COUNT: 15.2 % (ref 11.6–15.1)
GFR SERPL CREATININE-BSD FRML MDRD: 30 ML/MIN/1.73SQ M
GLUCOSE SERPL-MCNC: 92 MG/DL (ref 65–140)
HCT VFR BLD AUTO: 35 % (ref 34.8–46.1)
HGB BLD-MCNC: 11.2 G/DL (ref 11.5–15.4)
MAGNESIUM SERPL-MCNC: 1.8 MG/DL (ref 1.6–2.6)
MCH RBC QN AUTO: 29.6 PG (ref 26.8–34.3)
MCHC RBC AUTO-ENTMCNC: 32 G/DL (ref 31.4–37.4)
MCV RBC AUTO: 93 FL (ref 82–98)
PLATELET # BLD AUTO: 181 THOUSANDS/UL (ref 149–390)
PMV BLD AUTO: 10.7 FL (ref 8.9–12.7)
POTASSIUM SERPL-SCNC: 4 MMOL/L (ref 3.5–5.3)
RBC # BLD AUTO: 3.78 MILLION/UL (ref 3.81–5.12)
SODIUM SERPL-SCNC: 139 MMOL/L (ref 136–145)
WBC # BLD AUTO: 8.58 THOUSAND/UL (ref 4.31–10.16)

## 2020-08-16 PROCEDURE — 85027 COMPLETE CBC AUTOMATED: CPT | Performed by: INTERNAL MEDICINE

## 2020-08-16 PROCEDURE — 97116 GAIT TRAINING THERAPY: CPT

## 2020-08-16 PROCEDURE — 80048 BASIC METABOLIC PNL TOTAL CA: CPT | Performed by: INTERNAL MEDICINE

## 2020-08-16 PROCEDURE — 83735 ASSAY OF MAGNESIUM: CPT | Performed by: INTERNAL MEDICINE

## 2020-08-16 PROCEDURE — 99232 SBSQ HOSP IP/OBS MODERATE 35: CPT | Performed by: INTERNAL MEDICINE

## 2020-08-16 RX ORDER — METHYLPHENIDATE HYDROCHLORIDE 10 MG/1
5 TABLET ORAL
Status: DISCONTINUED | OUTPATIENT
Start: 2020-08-17 | End: 2020-08-18 | Stop reason: HOSPADM

## 2020-08-16 RX ORDER — MEGESTROL ACETATE 40 MG/1
40 TABLET ORAL 4 TIMES DAILY
Status: DISCONTINUED | OUTPATIENT
Start: 2020-08-16 | End: 2020-08-18 | Stop reason: HOSPADM

## 2020-08-16 RX ADMIN — HEPARIN SODIUM 5000 UNITS: 5000 INJECTION INTRAVENOUS; SUBCUTANEOUS at 21:33

## 2020-08-16 RX ADMIN — MEMANTINE 10 MG: 10 TABLET ORAL at 09:48

## 2020-08-16 RX ADMIN — MEGESTROL ACETATE 40 MG: 40 TABLET ORAL at 18:52

## 2020-08-16 RX ADMIN — LUBIPROSTONE 24 MCG: 24 CAPSULE, GELATIN COATED ORAL at 09:48

## 2020-08-16 RX ADMIN — SUCRALFATE 1000 MG: 1 SUSPENSION ORAL at 05:17

## 2020-08-16 RX ADMIN — HEPARIN SODIUM 5000 UNITS: 5000 INJECTION INTRAVENOUS; SUBCUTANEOUS at 13:34

## 2020-08-16 RX ADMIN — FLUOXETINE 40 MG: 20 CAPSULE ORAL at 09:48

## 2020-08-16 RX ADMIN — METHYLPHENIDATE HYDROCHLORIDE 10 MG: 10 TABLET ORAL at 05:17

## 2020-08-16 RX ADMIN — DONEPEZIL HYDROCHLORIDE 10 MG: 5 TABLET, FILM COATED ORAL at 21:33

## 2020-08-16 RX ADMIN — MEGESTROL ACETATE 40 MG: 40 TABLET ORAL at 21:34

## 2020-08-16 RX ADMIN — MEGESTROL ACETATE 40 MG: 40 TABLET ORAL at 13:30

## 2020-08-16 RX ADMIN — SUCRALFATE 1000 MG: 1 SUSPENSION ORAL at 13:30

## 2020-08-16 RX ADMIN — LUBIPROSTONE 24 MCG: 24 CAPSULE, GELATIN COATED ORAL at 18:52

## 2020-08-16 RX ADMIN — PANTOPRAZOLE SODIUM 40 MG: 40 TABLET, DELAYED RELEASE ORAL at 18:52

## 2020-08-16 RX ADMIN — HEPARIN SODIUM 5000 UNITS: 5000 INJECTION INTRAVENOUS; SUBCUTANEOUS at 05:17

## 2020-08-16 RX ADMIN — PANTOPRAZOLE SODIUM 40 MG: 40 TABLET, DELAYED RELEASE ORAL at 09:48

## 2020-08-16 RX ADMIN — SUCRALFATE 1000 MG: 1 SUSPENSION ORAL at 18:52

## 2020-08-16 NOTE — PLAN OF CARE
Problem: PHYSICAL THERAPY ADULT  Goal: Performs mobility at highest level of function for planned discharge setting  See evaluation for individualized goals  Description: Treatment/Interventions: Functional transfer training, LE strengthening/ROM, Therapeutic exercise, Endurance training, Cognitive reorientation, Patient/family training, Equipment eval/education, Bed mobility, Gait training, Elevations  Equipment Recommended: Walker       See flowsheet documentation for full assessment, interventions and recommendations  Outcome: Not Progressing  Note: Prognosis: Fair  Problem List: Decreased strength, Decreased endurance, Impaired balance, Decreased mobility, Decreased cognition, Impaired hearing, Obesity  Assessment: pt shows decline in mobility status since previous session w/ increased level of assistance required to maintain mobility safety  pt displayed frequent impulsive behavior and had no carryover of safety feedback provided  pt was unable to perform personal cleaning after having bowel movement  pt is at risk for falling per results of 30 second chair stand test: 0 (less than 4 indicates fall risk in females 90-94)  pt needs continued inpatient PT to reduce fall risk factors  discharge recommendation is for inpatient rehab to maximize level of functional independence  pt would benefit from OT consult to address safety awareness and impulsive behavior  PT Discharge Recommendation: Post-Acute Rehabilitation Services          See flowsheet documentation for full assessment

## 2020-08-16 NOTE — PROGRESS NOTES
Progress Note - Angel Barcenas 3/14/1924, 80 y o  female MRN: [id number]    Unit/Bed#: S -01 Encounter: [id number]    Primary Care Provider: Jelani Alvarez MD   Date and time admitted to hospital: 8/13/2020  4:03 PM        * Failure to thrive in adult  Assessment & Plan  · Patient presents from home with her 2 children who she lives with due to her decrease in oral intake over the last 6 months and generalized weakness  · Her son states that she has lost approximately 10 lb over the last 6 months  · Her p o  Intake has noticeably decreased acutely over the last month, to the point where she is only eating 1 ensure supplement daily and some ice cream and soda  · Daughter states she would like conservative management, does not want EGD  · We will begin Ritalin taper, with plan to discontinue Ritalin  · Appetite stimulant    Plan:  · GI consulted  See epigastric pain  · Nutrition consult pending  · Supportive care with IV hydration  · Ritalin taper to discontinue Ritalin  · PT/OT evaluations- suggested skilled inpatient PT  Home PT pending mobility progress and stairs in order to decrease fall risk and maximize level of functional independence  Plan to discharge discharge to home with skilled therapy with walker  Awaiting OT evaluation    Dyspepsia  Assessment & Plan  · Patient has had decreased oral intake over the last 6 months with approximately 10 lb weight loss over that time  · She has decreased p o   Intake, and son states that she complains of epigastric pain    Plan:  · Protonix 40 mg b i d   · Follow-up H pylori serology  · Carafate 4 times daily    1st degree AV block  Assessment & Plan  · Patient has 1st degree heart block with mildly prolonged NC Interval on EKG  · Patient has bradycardia at baseline with heart rates in the 50s to 60s range  · Cardiology was consulted and the patient was on telemetry on her 1st night in the hospital which showed  sinus bradycardia; family would prefer conservative management and she follows with palliative care as outpatient    Plan:  · Outpatient follow-up    Bradycardia  Assessment & Plan  · Heart rate in the 40s on arrival to ED  · EKG on admission: sinus bradycardia with first-degree AV block with PACs, heart rate 57  · Troponin <0 02  · Son states that her baseline heart rate is in the 50s to 60s  · Patient does not appear to be symptomatic  · Normotensive and hemodynamically stable  · Sinus bradycardia a 40/PVCs overnight on telemetry  ECG done which showed first-degree heart block with QTC over 530  Magnesium replaced at 2 g/2 hours IV  · Repeat EKG on 08/15/2020 shows QTC improved to 349    Plan:  · Patient follows with palliative care as an outpatient  · Consider discontinuing Aricept as it can cause sinus bradycardia    CKD (chronic kidney disease) stage 4, GFR 15-29 ml/min ([facility])  Assessment & Plan   Creatinine upon admission: 2 03; Baseline: 1 9-2 0 over the last few months    Recent Labs     08/14/20  0521 08/15/20  0531 08/16/20  1110   CREATININE 1 80* 1 48* 1 48*         Plan:   Continue to monitor renal function   Gentle IV hydration at 50 mL/hour with decreased p o   Intake    Dementia ([facility] [address] )  Assessment & Plan  · Patient is on Aricept, Prozac, Ritalin and Namenda    Plan:  · Supportive care  · Continue home medication  · Continue discontinuing Aricept as it can cause bradycardia  · Can consider mirtazapine instead of Prozac pending patient's GI evaluation  · Can considering discontinue Ritalin with taper as Ritalin can cause appetite suppression    Hypertension  Assessment & Plan  · Blood pressures are in acceptable range, patient was hypertensive to 169/70 on admission, but was 151/70 this morning   Patient is currently not on any home antihypertensive medications    Plan:   Monitor blood pressure   Okay with higher blood pressure targets at her age    Epigastric pain  Assessment & Plan  · Over the last 6 months with approximately 10 lb weight loss over that time  · She has decreased p o  Intake, and son states that she complains of epigastric pain  · GI consulted for failure to thrive and epigastric pain  · Assessment: GI suspects  anorexia and poor appetite most likely related to advanced dementia    Plan  · Follow-up H pylori serology  · Calorie count  · Begin Ritalin taper to discontinue Ritalin  · Consider appetite stimulant pending GI  · Ensure supplements t i d   · Protonix 40 mg b i d  · Carafate 4 times daily    Hypomagnesemia  Assessment & Plan  · Mg 1 3 on 08/15/2020  · Magnesium replaced on 08/15/2020    Plan  · Continue to monitor magnesium levels    Hypokalemia  Assessment & Plan  · K 3 0 on 08/15/2020  · Mg decreased to 1 3 on 08/15/2020  · 80 mEq potassium total given on 08/15/2020    Plan  · Follow-up BMP today        VTE Pharmacologic Prophylaxis:   Pharmacologic: Heparin  Mechanical VTE Prophylaxis in Place: Yes    Discussions with Specialists or Other Care Team Provider:  Discussed case with nursing team, and read consult notes    Education and Discussions with Family / Patient: Will update patient's daughter later today    Current Length of Stay: 3 day(s)    Current Patient Status: Inpatient     Discharge Plan / Estimated Discharge Date:  2020    Code Status: Level 1 - Full Code      Subjective:   No acute overnight events  Patient was difficult to speak with this morning due to her baseline hearing deficit and her dementia  She continued to states that she was fine  Patient's daughter states that she would prefer to wait EGD if possible  She would like to try medical management to increase her mother's appetite, as she believes the decreased appetite is secondary to her dementia  We will start by decreasing her Ritalin dose and that discontinuing Ritalin altogether  We will also start an appetite stimulant on her      Objective:     Vitals:   Temp (24hrs), Av 3 °F (36 8 °C), Min:98 2 °F (36 8 °C), Max:98 3 °F (36 8 °C)    Temp:  [98 2 °F (36 8 °C)-98 3 °F (36 8 °C)] 98 2 °F (36 8 °C)  HR:  [50-57] 54  Resp:  [17-18] 18  BP: (136-151)/(64-70) 140/65  SpO2:  [93 %-96 %] 93 %  Body mass index is 30 3 kg/m²  Input and Output Summary (last 24 hours): Intake/Output Summary (Last 24 hours) at 8/16/2020 1152  Last data filed at 8/15/2020 2236  Gross per 24 hour   Intake 1120 ml   Output 300 ml   Net 820 ml       Physical Exam:     Physical Exam  Vitals signs and nursing note reviewed  Constitutional:       General: She is not in acute distress  HENT:      Head: Normocephalic and atraumatic  Nose: Nose normal  No congestion  Eyes:      General: No scleral icterus  Conjunctiva/sclera: Conjunctivae normal    Neck:      Musculoskeletal: Normal range of motion  No muscular tenderness  Cardiovascular:      Rate and Rhythm: Normal rate and regular rhythm  Pulses: Normal pulses  Heart sounds: Murmur present  Comments: Harsh holosystolic murmur  Pulmonary:      Effort: Pulmonary effort is normal       Breath sounds: Normal breath sounds  No wheezing or rales  Abdominal:      General: Abdomen is flat  Bowel sounds are normal  There is no distension  Palpations: Abdomen is soft  Tenderness: There is no abdominal tenderness  Comments: Hepatomegaly   Musculoskeletal: Normal range of motion  General: No tenderness  Right lower leg: No edema  Left lower leg: No edema  Skin:     General: Skin is warm and dry  Capillary Refill: Capillary refill takes less than 2 seconds  Coloration: Skin is not jaundiced  Findings: No bruising  Neurological:      Mental Status: She is alert  Mental status is at baseline  Comments: Patient only oriented to person, not oriented to time or place   Psychiatric:         Attention and Perception: Attention normal          Mood and Affect: Mood normal          Behavior: Behavior is cooperative           Additional Data: Labs:    Results from last 7 days   Lab Units 08/16/20  1110  08/13/20  1659   WBC Thousand/uL 8 58   < > 9 27   HEMOGLOBIN g/dL 11 2*   < > 12 9   HEMATOCRIT % 35 0   < > 41 4   PLATELETS Thousands/uL 181   < > 211   NEUTROS PCT %  --   --  73   LYMPHS PCT %  --   --  14   MONOS PCT %  --   --  11   EOS PCT %  --   --  1    < > = values in this interval not displayed  Results from last 7 days   Lab Units 08/16/20  1110  08/13/20  1659   POTASSIUM mmol/L 4 0   < > 3 8   CHLORIDE mmol/L 107   < > 109*   CO2 mmol/L 20*   < > 24   BUN mg/dL 13   < > 26*   CREATININE mg/dL 1 48*   < > 2 03*   CALCIUM mg/dL 8 7   < > 9 5   ALK PHOS U/L  --   --  65   ALT U/L  --   --  18   AST U/L  --   --  27    < > = values in this interval not displayed  Results from last 7 days   Lab Units 08/13/20  1659   INR  1 06       * I Have Reviewed All Lab Data Listed Above  * Additional Pertinent Lab Tests Reviewed: Susan 66 Admission Reviewed    Imaging:    Imaging Reports Reviewed Today Include:  None  Imaging Personally Reviewed by Myself Includes:  None    Recent Cultures (last 7 days):     Results from last 7 days   Lab Units 08/13/20 2047 08/13/20  1659   BLOOD CULTURE  No Growth at 48 hrs  No Growth at 48 hrs         Last 24 Hours Medication List:   Current Facility-Administered Medications   Medication Dose Route Frequency Provider Last Rate    acetaminophen  650 mg Oral Q6H PRN Dianne Cardinal, CRNP      donepezil  10 mg Oral HS Dianne Cardinal, CRNP      FLUoxetine  40 mg Oral Daily Dianne Cardinal, CRNP      heparin (porcine)  5,000 Units Subcutaneous [facility]      lubiprostone  24 mcg Oral BID With Meals Dianne CardinalKARENNP      memantine  10 mg Oral Daily Dianne Cardinal, CRNP      methylphenidate  10 mg Oral BID before breakfast/lunch Dianne Cardinal, CRNP      pantoprazole  40 mg Oral BID Karyle Lesch, MD      sodium chloride  50 mL/hr Intravenous Continuous Nahomy Knee, CRNP 50 mL/hr (08/15/20 1740)    sucralfate  1,000 mg Oral Q6H Sd Cantu MD          Today, Patient Was Seen By: Yon Yap MD    ** Please Note: This note has been constructed using a voice recognition system   **

## 2020-08-16 NOTE — ASSESSMENT & PLAN NOTE
· Patient is on Aricept, Prozac, Ritalin and Namenda    Plan:  · Supportive care  · Continue home medication  · Continue discontinuing Aricept if patient's bradycardia becomes symptomatic  · Discontinuing Ritalin with taper to increase appetite

## 2020-08-16 NOTE — ASSESSMENT & PLAN NOTE
· Patient has 1st degree heart block with mildly prolonged PA Interval on EKG  · Patient has bradycardia at baseline with heart rates in the 50s to 60s range  · Cardiology was consulted and the patient was on telemetry on her 1st night in the hospital which showed  sinus bradycardia; family would prefer conservative management and she follows with palliative care as outpatient    Plan:  · Outpatient follow-up

## 2020-08-16 NOTE — PHYSICAL THERAPY NOTE
PHYSICAL THERAPY TREATMENT NOTE    Patient Name: Shamar Michele  [id number] Date: 8/16/2020 08/16/20 1420   Pain Assessment   Pain Assessment Tool Pain Assessment not indicated - pt denies pain   Restrictions/Precautions   Other Precautions Chair Alarm; Bed Alarm;Multiple lines; Fall Risk;Hard of hearing;Cognitive; Impulsive   General   Chart Reviewed Yes   Family/Caregiver Present Yes  (family was initially present during session then left)   Cognition   Arousal/Participation Cooperative   Attention Attends with cues to redirect   Orientation Level Oriented to person; Other (Comment)  (pt was identified w/ full name, birth date)   Following Commands Follows one step commands inconsistently   Subjective   Subjective therapist responded to call bell  pt seen supine in bed w/ family present initially during session  pt needed frequent input for task focus  pt denied pain or dizziness  pt did not state goals when asked  Bed Mobility   Supine to Sit 4  Minimal assistance   Additional items Assist x 1;HOB elevated; Increased time required; Impulsive;Verbal cues  (for trunk/LE positioning)   Sit to Supine 4  Minimal assistance   Additional items Assist x 1;HOB elevated; Impulsive;Verbal cues  (for body trunk positioning)   Additional Comments 30 Second Chair Stand Test: 0 (as pt is unable to stand w/o use of UEs)  Transfers   Sit to Stand 4  Minimal assistance   Additional items Assist x 1; Impulsive;Verbal cues  (for LE positioning, hand placement)   Stand to Sit 4  Minimal assistance  (pt attempted to sit down when not aligned appropriately)   Additional items Assist x 1; Impulsive;Verbal cues  (for body positioning, hand placement, controlled descent)   Toilet transfer 4  Minimal assistance   Additional items Assist x 1;Commode; Impulsive;Verbal cues  (for body positioning, hand placement, controlled descent)   Additional Comments pt completed standing hip flexion 4x bilaterally w/ roller walker w/ modx1  steps not attempted due to significant assist required  Ambulation/Elevation   Gait pattern Forward Flexion;Narrow JENIFER; Short stride; Excessively slow   Gait Assistance 4  Minimal assist   Additional items Assist x 1;Verbal cues; Tactile cues  (for walker positioning, posture, full step length)   Assistive Device Rolling walker   Distance 3 feet x2  30 feet  additional not possible due to fatigue  Stair Management Assistance   (see additional comments above)   Balance   Static Sitting Fair +   Static Standing Poor +   Ambulatory Poor +  (w/ roller walker)   Activity Tolerance   Activity Tolerance Patient limited by fatigue   Nurse Made Aware spoke to Sharla 26   Assessment   Prognosis Fair   Problem List Decreased strength;Decreased endurance; Impaired balance;Decreased mobility; Decreased cognition; Impaired hearing;Obesity   Assessment pt shows decline in mobility status since previous session w/ increased level of assistance required to maintain mobility safety  pt displayed frequent impulsive behavior and had no carryover of safety feedback provided  pt was unable to perform personal cleaning after having bowel movement  pt is at risk for falling per results of 30 second chair stand test: 0 (less than 4 indicates fall risk in females 90-94)  pt needs continued inpatient PT to reduce fall risk factors  discharge recommendation is for inpatient rehab to maximize level of functional independence  pt would benefit from OT consult to address safety awareness and impulsive behavior  Goals   Patient Goals bring this food home to my dog  STG Expiration Date 08/24/20   Short Term Goal #1 Patient will:  Increase bilateral LE strength 1/2 grade to facilitate independent mobility, Perform all bed mobility tasks independently to decrease fall risk factors, Perform all transfers independently to improve independence, Ambulate at least 150 ft  with roller walker modified independent w/o LOB, Navigate 4 stairs w/ supervision with unilateral handrail to facilitate return to previous living environment, Increase all balance 1/2 grade to decrease risk for falls, Complete exercise program independently, Tolerate 3 hr OOB to faciliate upright tolerance and Improve Barthel Index score to 65 or greater to facilitate independence  ADDENDUM TO PRESENT GOALS: pt will complete 30 second chair stand test w/ score of 3 or greater to reduce risk for falling  PT Treatment Day 1   Plan   Treatment/Interventions Functional transfer training;LE strengthening/ROM; Therapeutic exercise; Endurance training;Cognitive reorientation;Patient/family training;Equipment eval/education; Bed mobility;Gait training;Elevations   Progress Slow progress, decreased activity tolerance   PT Frequency Other (Comment)  (3 to 5x/week)   Recommendation   PT Discharge Recommendation Post-Acute Rehabilitation Services   Equipment Recommended Other (Comment)  (roller walker)   Additional Comments pt would benefit from OT consult to address safety awareness and impulsive behavior  30 second chair stand test: 0 (less than 4 indicates fall risk in females 90-94)  Skilled inpatient PT recommended while in hospital to progress pt toward treatment goals      Maximo Tapia, PT

## 2020-08-16 NOTE — ASSESSMENT & PLAN NOTE
· Over the last 6 months with approximately 10 lb weight loss over that time  · She has decreased p o  Intake, and son states that she complains of epigastric pain  · GI consulted for failure to thrive and epigastric pain  · Assessment: GI suspects  anorexia and poor appetite most likely related to advanced dementia    Plan  · Follow-up H pylori serology  · Calorie count  · Ritalin tapered, plan to discontinue Ritalin  · May gaze 40 mg q i d  Started  · Ensure supplements with meals  · Protonix 40 mg b i d    · Carafate 4 times daily

## 2020-08-16 NOTE — ASSESSMENT & PLAN NOTE
· Heart rate in the 40s on arrival to ED  · EKG on admission: sinus bradycardia with first-degree AV block with PACs, heart rate 57  · Troponin <0 02  · Son states that her baseline heart rate is in the 50s to 60s  · Patient does not appear to be symptomatic  · Normotensive and hemodynamically stable  · Sinus bradycardia a 40/PVCs overnight on telemetry  ECG done which showed first-degree heart block with QTC over 530   Magnesium replaced at 2 g/2 hours IV  · Repeat EKG on 08/15/2020 shows QTC improved to 349    Plan:  · Patient follows with palliative care as an outpatient  · Can Consider discontinuing Aricept if patient becomes symptomatic

## 2020-08-16 NOTE — ASSESSMENT & PLAN NOTE
· Patient has had decreased oral intake over the last 6 months with approximately 10 lb weight loss over that time  · She has decreased p o   Intake, and son states that she complains of epigastric pain    Plan:  · Protonix 40 mg b i d   · Follow-up H pylori serology  · Carafate 4 times daily

## 2020-08-16 NOTE — ASSESSMENT & PLAN NOTE
· K 3 0 on 08/15/2020  · Mg decreased to 1 3 on 08/15/2020  · 80 mEq potassium total given on 08/15/2020  · Potassium improved to 4 0 on 08/16/2020    Plan  · Continue to monitor potassium levels

## 2020-08-16 NOTE — PLAN OF CARE
Problem: Potential for Falls  Goal: Patient will remain free of falls  Description: INTERVENTIONS:  - Assess patient frequently for physical needs  -  Identify cognitive and physical deficits and behaviors that affect risk of falls  -  [place] fall precautions as indicated by assessment   - Educate patient/family on patient safety including physical limitations  - Instruct patient to call for assistance with activity based on assessment  - Modify environment to reduce risk of injury  - Consider OT/PT consult to assist with strengthening/mobility  Outcome: Progressing     Problem: Prexisting or High Potential for Compromised Skin Integrity  Goal: Skin integrity is maintained or improved  Description: INTERVENTIONS:  - Identify patients at risk for skin breakdown  - Assess and monitor skin integrity  - Assess and monitor nutrition and hydration status  - Monitor labs   - Assess for incontinence   - Turn and reposition patient  - Assist with mobility/ambulation  - Relieve pressure over bony prominences  - Avoid friction and shearing  - Provide appropriate hygiene as needed including keeping skin clean and dry  - Evaluate need for skin moisturizer/barrier cream  - Collaborate with interdisciplinary team   - Patient/family teaching  - Consider wound care consult   Outcome: Progressing     Problem: Nutrition/Hydration-ADULT  Goal: Nutrient/Hydration intake appropriate for improving, restoring or maintaining nutritional needs  Description: Monitor and assess patient's nutrition/hydration status for malnutrition  Collaborate with interdisciplinary team and initiate plan and interventions as ordered  Monitor patient's weight and dietary intake as ordered or per policy  Utilize nutrition screening tool and intervene as necessary  Determine patient's food preferences and provide high-protein, high-caloric foods as appropriate       INTERVENTIONS:  - Monitor oral intake, urinary output, labs, and treatment plans  - Assess nutrition and hydration status and recommend course of action  - Evaluate amount of meals eaten  - Assist patient with eating if necessary   - Allow adequate time for meals  - Recommend/ encourage appropriate diets, oral nutritional supplements, and vitamin/mineral supplements  - Order, calculate, and assess calorie counts as needed  - Recommend, monitor, and adjust tube feedings and TPN/PPN based on assessed needs  - Assess need for intravenous fluids  - Provide specific nutrition/hydration education as appropriate  - Include patient/family/caregiver in decisions related to nutrition  Outcome: Progressing     Problem: PAIN - ADULT  Goal: Verbalizes/displays adequate comfort level or baseline comfort level  Description: Interventions:  - Encourage patient to monitor pain and request assistance  - Assess pain using appropriate pain scale  - Administer analgesics based on type and severity of pain and evaluate response  - Implement non-pharmacological measures as appropriate and evaluate response  - Consider cultural and social influences on pain and pain management  - Notify physician/advanced practitioner if interventions unsuccessful or patient reports new pain  Outcome: Progressing     Problem: INFECTION - ADULT  Goal: Absence or prevention of progression during hospitalization  Description: INTERVENTIONS:  - Assess and monitor for signs and symptoms of infection  - Monitor lab/diagnostic results  - Monitor all insertion sites, i e  indwelling lines, tubes, and drains  - Monitor endotracheal if appropriate and nasal secretions for changes in amount and color  - [place] appropriate cooling/warming therapies per order  - Administer medications as ordered  - Instruct and encourage patient and family to use good hand hygiene technique  - Identify and instruct in appropriate isolation precautions for identified infection/condition  Outcome: Progressing  Goal: Absence of fever/infection during neutropenic period  Description: INTERVENTIONS:  - Monitor WBC    Outcome: Progressing     Problem: SAFETY ADULT  Goal: Maintain or return to baseline ADL function  Description: INTERVENTIONS:  -  Assess patient's ability to carry out ADLs; assess patient's baseline for ADL function and identify physical deficits which impact ability to perform ADLs (bathing, care of mouth/teeth, toileting, grooming, dressing, etc )  - Assess/evaluate cause of self-care deficits   - Assess range of motion  - Assess patient's mobility; develop plan if impaired  - Assess patient's need for assistive devices and provide as appropriate  - Encourage maximum independence but intervene and supervise when necessary  - Involve family in performance of ADLs  - Assess for home care needs following discharge   - Consider OT consult to assist with ADL evaluation and planning for discharge  - Provide patient education as appropriate  Outcome: Progressing  Goal: Maintain or return mobility status to optimal level  Description: INTERVENTIONS:  - Assess patient's baseline mobility status (ambulation, transfers, stairs, etc )    - Identify cognitive and physical deficits and behaviors that affect mobility  - Identify mobility aids required to assist with transfers and/or ambulation (gait belt, sit-to-stand, lift, walker, cane, etc )  - [place] fall precautions as indicated by assessment  - Record patient progress and toleration of activity level on Mobility SBAR; progress patient to next Phase/Stage  - Instruct patient to call for assistance with activity based on assessment  - Consider rehabilitation consult to assist with strengthening/weightbearing, etc   Outcome: Progressing     Problem: DISCHARGE PLANNING  Goal: Discharge to home or other facility with appropriate resources  Description: INTERVENTIONS:  - Identify barriers to discharge w/patient and caregiver  - Arrange for needed discharge resources and transportation as appropriate  - Identify discharge learning needs (meds, wound care, etc )  - Arrange for interpretive services to assist at discharge as needed  - Refer to Case Management Department for coordinating discharge planning if the patient needs post-hospital services based on physician/advanced practitioner order or complex needs related to functional status, cognitive ability, or social support system  Outcome: Progressing     Problem: Knowledge Deficit  Goal: Patient/family/caregiver demonstrates understanding of disease process, treatment plan, medications, and discharge instructions  Description: Complete learning assessment and assess knowledge base    Interventions:  - Provide teaching at level of understanding  - Provide teaching via preferred learning methods  Outcome: Progressing     Problem: SKIN/TISSUE INTEGRITY - ADULT  Goal: Skin integrity remains intact  Description: INTERVENTIONS  - Identify patients at risk for skin breakdown  - Assess and monitor skin integrity  - Assess and monitor nutrition and hydration status  - Monitor labs (i e  albumin)  - Assess for incontinence   - Turn and reposition patient  - Assist with mobility/ambulation  - Relieve pressure over bony prominences  - Avoid friction and shearing  - Provide appropriate hygiene as needed including keeping skin clean and dry  - Evaluate need for skin moisturizer/barrier cream  - Collaborate with interdisciplinary team (i e  Nutrition, Rehabilitation, etc )   - Patient/family teaching  Outcome: Progressing     Problem: MUSCULOSKELETAL - ADULT  Goal: Maintain or return mobility to safest level of function  Description: INTERVENTIONS:  - Assess patient's ability to carry out ADLs; assess patient's baseline for ADL function and identify physical deficits which impact ability to perform ADLs (bathing, care of mouth/teeth, toileting, grooming, dressing, etc )  - Assess/evaluate cause of self-care deficits   - Assess range of motion  - Assess patient's mobility  - Assess patient's need for assistive devices and provide as appropriate  - Encourage maximum independence but intervene and supervise when necessary  - Involve family in performance of ADLs  - Assess for home care needs following discharge   - Consider OT consult to assist with ADL evaluation and planning for discharge  - Provide patient education as appropriate  Outcome: Progressing

## 2020-08-16 NOTE — ASSESSMENT & PLAN NOTE
· Patient presents from home with her 2 children who she lives with due to her decrease in oral intake over the last 6 months and generalized weakness  · Her son states that she has lost approximately 10 lb over the last 6 months  · Her p o  Intake has noticeably decreased acutely over the last month, to the point where she is only eating 1 ensure supplement daily and some ice cream and soda  · Daughter states she would like conservative management, does not want EGD  · Ritalin tapered to 5 mg b i d  From 10 mg b i d  With plan to discontinue  · May case 40 mg q i d  Started 08/16/2020    Plan:  · Nutrition consult pending  · Supportive care with IV hydration as needed  · Discontinue Ritalin after taper  · PT/OT evaluations- suggested skilled inpatient PT  Home PT pending mobility progress and stairs in order to decrease fall risk and maximize level of functional independence  Plan to discharge discharge to home with skilled therapy with walker    Awaiting OT evaluation  · Palliative care consulted; family meeting scheduled for 08/18/2020

## 2020-08-16 NOTE — PLAN OF CARE
Problem: Potential for Falls  Goal: Patient will remain free of falls  Description: INTERVENTIONS:  - Assess patient frequently for physical needs  -  Identify cognitive and physical deficits and behaviors that affect risk of falls  -  [place] fall precautions as indicated by assessment   - Educate patient/family on patient safety including physical limitations  - Instruct patient to call for assistance with activity based on assessment  - Modify environment to reduce risk of injury  - Consider OT/PT consult to assist with strengthening/mobility  Outcome: Progressing     Problem: Prexisting or High Potential for Compromised Skin Integrity  Goal: Skin integrity is maintained or improved  Description: INTERVENTIONS:  - Identify patients at risk for skin breakdown  - Assess and monitor skin integrity  - Assess and monitor nutrition and hydration status  - Monitor labs   - Assess for incontinence   - Turn and reposition patient  - Assist with mobility/ambulation  - Relieve pressure over bony prominences  - Avoid friction and shearing  - Provide appropriate hygiene as needed including keeping skin clean and dry  - Evaluate need for skin moisturizer/barrier cream  - Collaborate with interdisciplinary team   - Patient/family teaching  - Consider wound care consult   Outcome: Progressing     Problem: Nutrition/Hydration-ADULT  Goal: Nutrient/Hydration intake appropriate for improving, restoring or maintaining nutritional needs  Description: Monitor and assess patient's nutrition/hydration status for malnutrition  Collaborate with interdisciplinary team and initiate plan and interventions as ordered  Monitor patient's weight and dietary intake as ordered or per policy  Utilize nutrition screening tool and intervene as necessary  Determine patient's food preferences and provide high-protein, high-caloric foods as appropriate       INTERVENTIONS:  - Monitor oral intake, urinary output, labs, and treatment plans  - Assess nutrition and hydration status and recommend course of action  - Evaluate amount of meals eaten  - Assist patient with eating if necessary   - Allow adequate time for meals  - Recommend/ encourage appropriate diets, oral nutritional supplements, and vitamin/mineral supplements  - Order, calculate, and assess calorie counts as needed  - Recommend, monitor, and adjust tube feedings and TPN/PPN based on assessed needs  - Assess need for intravenous fluids  - Provide specific nutrition/hydration education as appropriate  - Include patient/family/caregiver in decisions related to nutrition  Outcome: Progressing     Problem: PAIN - ADULT  Goal: Verbalizes/displays adequate comfort level or baseline comfort level  Description: Interventions:  - Encourage patient to monitor pain and request assistance  - Assess pain using appropriate pain scale  - Administer analgesics based on type and severity of pain and evaluate response  - Implement non-pharmacological measures as appropriate and evaluate response  - Consider cultural and social influences on pain and pain management  - Notify physician/advanced practitioner if interventions unsuccessful or patient reports new pain  Outcome: Progressing     Problem: INFECTION - ADULT  Goal: Absence or prevention of progression during hospitalization  Description: INTERVENTIONS:  - Assess and monitor for signs and symptoms of infection  - Monitor lab/diagnostic results  - Monitor all insertion sites, i e  indwelling lines, tubes, and drains  - Monitor endotracheal if appropriate and nasal secretions for changes in amount and color  - [place] appropriate cooling/warming therapies per order  - Administer medications as ordered  - Instruct and encourage patient and family to use good hand hygiene technique  - Identify and instruct in appropriate isolation precautions for identified infection/condition  Outcome: Progressing  Goal: Absence of fever/infection during neutropenic period  Description: INTERVENTIONS:  - Monitor WBC    Outcome: Progressing     Problem: SAFETY ADULT  Goal: Maintain or return to baseline ADL function  Description: INTERVENTIONS:  -  Assess patient's ability to carry out ADLs; assess patient's baseline for ADL function and identify physical deficits which impact ability to perform ADLs (bathing, care of mouth/teeth, toileting, grooming, dressing, etc )  - Assess/evaluate cause of self-care deficits   - Assess range of motion  - Assess patient's mobility; develop plan if impaired  - Assess patient's need for assistive devices and provide as appropriate  - Encourage maximum independence but intervene and supervise when necessary  - Involve family in performance of ADLs  - Assess for home care needs following discharge   - Consider OT consult to assist with ADL evaluation and planning for discharge  - Provide patient education as appropriate  Outcome: Progressing  Goal: Maintain or return mobility status to optimal level  Description: INTERVENTIONS:  - Assess patient's baseline mobility status (ambulation, transfers, stairs, etc )    - Identify cognitive and physical deficits and behaviors that affect mobility  - Identify mobility aids required to assist with transfers and/or ambulation (gait belt, sit-to-stand, lift, walker, cane, etc )  - [place] fall precautions as indicated by assessment  - Record patient progress and toleration of activity level on Mobility SBAR; progress patient to next Phase/Stage  - Instruct patient to call for assistance with activity based on assessment  - Consider rehabilitation consult to assist with strengthening/weightbearing, etc   Outcome: Progressing     Problem: DISCHARGE PLANNING  Goal: Discharge to home or other facility with appropriate resources  Description: INTERVENTIONS:  - Identify barriers to discharge w/patient and caregiver  - Arrange for needed discharge resources and transportation as appropriate  - Identify discharge learning needs (meds, wound care, etc )  - Arrange for interpretive services to assist at discharge as needed  - Refer to Case Management Department for coordinating discharge planning if the patient needs post-hospital services based on physician/advanced practitioner order or complex needs related to functional status, cognitive ability, or social support system  Outcome: Progressing     Problem: Knowledge Deficit  Goal: Patient/family/caregiver demonstrates understanding of disease process, treatment plan, medications, and discharge instructions  Description: Complete learning assessment and assess knowledge base    Interventions:  - Provide teaching at level of understanding  - Provide teaching via preferred learning methods  Outcome: Progressing     Problem: SKIN/TISSUE INTEGRITY - ADULT  Goal: Skin integrity remains intact  Description: INTERVENTIONS  - Identify patients at risk for skin breakdown  - Assess and monitor skin integrity  - Assess and monitor nutrition and hydration status  - Monitor labs (i e  albumin)  - Assess for incontinence   - Turn and reposition patient  - Assist with mobility/ambulation  - Relieve pressure over bony prominences  - Avoid friction and shearing  - Provide appropriate hygiene as needed including keeping skin clean and dry  - Evaluate need for skin moisturizer/barrier cream  - Collaborate with interdisciplinary team (i e  Nutrition, Rehabilitation, etc )   - Patient/family teaching  Outcome: Progressing     Problem: MUSCULOSKELETAL - ADULT  Goal: Maintain or return mobility to safest level of function  Description: INTERVENTIONS:  - Assess patient's ability to carry out ADLs; assess patient's baseline for ADL function and identify physical deficits which impact ability to perform ADLs (bathing, care of mouth/teeth, toileting, grooming, dressing, etc )  - Assess/evaluate cause of self-care deficits   - Assess range of motion  - Assess patient's mobility  - Assess patient's need for assistive devices and provide as appropriate  - Encourage maximum independence but intervene and supervise when necessary  - Involve family in performance of ADLs  - Assess for home care needs following discharge   - Consider OT consult to assist with ADL evaluation and planning for discharge  - Provide patient education as appropriate  Outcome: Progressing

## 2020-08-16 NOTE — ASSESSMENT & PLAN NOTE
 Creatinine upon admission: 2 03; Baseline: 1 9-2 0 over the last few months    Recent Labs     08/14/20  0521 08/15/20  0531 08/16/20  1110   CREATININE 1 80* 1 48* 1 48*         Plan:   Continue to monitor renal function

## 2020-08-16 NOTE — ASSESSMENT & PLAN NOTE
· Blood pressures are in acceptable range, patient was hypertensive to 169/70 on admission, but was 165/74 this morning   Patient is currently not on any home antihypertensive medications    Plan:   Monitor blood pressure   Okay with higher blood pressure targets at her age

## 2020-08-17 LAB
ATRIAL RATE: 45 BPM
ATRIAL RATE: 46 BPM
ATRIAL RATE: 56 BPM
H PYLORI IGG SER IA-ACNC: 1.4 INDEX VALUE (ref 0–0.79)
H PYLORI IGM SER-ACNC: <9 UNITS (ref 0–8.9)
P AXIS: 112 DEGREES
P AXIS: 18 DEGREES
P AXIS: 44 DEGREES
PR INTERVAL: 208 MS
PR INTERVAL: 218 MS
PR INTERVAL: 220 MS
QRS AXIS: -34 DEGREES
QRS AXIS: -45 DEGREES
QRS AXIS: -45 DEGREES
QRSD INTERVAL: 104 MS
QRSD INTERVAL: 104 MS
QRSD INTERVAL: 110 MS
QT INTERVAL: 522 MS
QT INTERVAL: 566 MS
QT INTERVAL: 608 MS
QTC INTERVAL: 489 MS
QTC INTERVAL: 503 MS
QTC INTERVAL: 532 MS
T WAVE AXIS: -32 DEGREES
T WAVE AXIS: -56 DEGREES
T WAVE AXIS: -82 DEGREES
VENTRICULAR RATE: 45 BPM
VENTRICULAR RATE: 46 BPM
VENTRICULAR RATE: 56 BPM

## 2020-08-17 PROCEDURE — 99232 SBSQ HOSP IP/OBS MODERATE 35: CPT | Performed by: INTERNAL MEDICINE

## 2020-08-17 PROCEDURE — 93010 ELECTROCARDIOGRAM REPORT: CPT | Performed by: INTERNAL MEDICINE

## 2020-08-17 PROCEDURE — NC001 PR NO CHARGE: Performed by: INTERNAL MEDICINE

## 2020-08-17 PROCEDURE — 99223 1ST HOSP IP/OBS HIGH 75: CPT | Performed by: NURSE PRACTITIONER

## 2020-08-17 RX ADMIN — LUBIPROSTONE 24 MCG: 24 CAPSULE, GELATIN COATED ORAL at 17:38

## 2020-08-17 RX ADMIN — MEGESTROL ACETATE 40 MG: 40 TABLET ORAL at 08:37

## 2020-08-17 RX ADMIN — HEPARIN SODIUM 5000 UNITS: 5000 INJECTION INTRAVENOUS; SUBCUTANEOUS at 13:18

## 2020-08-17 RX ADMIN — MEGESTROL ACETATE 40 MG: 40 TABLET ORAL at 17:38

## 2020-08-17 RX ADMIN — MEMANTINE 10 MG: 10 TABLET ORAL at 08:37

## 2020-08-17 RX ADMIN — MEGESTROL ACETATE 40 MG: 40 TABLET ORAL at 11:50

## 2020-08-17 RX ADMIN — MEGESTROL ACETATE 40 MG: 40 TABLET ORAL at 21:14

## 2020-08-17 RX ADMIN — PANTOPRAZOLE SODIUM 40 MG: 40 TABLET, DELAYED RELEASE ORAL at 08:37

## 2020-08-17 RX ADMIN — FLUOXETINE 40 MG: 20 CAPSULE ORAL at 08:37

## 2020-08-17 RX ADMIN — METHYLPHENIDATE HYDROCHLORIDE 5 MG: 10 TABLET ORAL at 11:50

## 2020-08-17 RX ADMIN — HEPARIN SODIUM 5000 UNITS: 5000 INJECTION INTRAVENOUS; SUBCUTANEOUS at 21:13

## 2020-08-17 RX ADMIN — METHYLPHENIDATE HYDROCHLORIDE 5 MG: 10 TABLET ORAL at 05:50

## 2020-08-17 RX ADMIN — DONEPEZIL HYDROCHLORIDE 10 MG: 5 TABLET, FILM COATED ORAL at 21:13

## 2020-08-17 RX ADMIN — LUBIPROSTONE 24 MCG: 24 CAPSULE, GELATIN COATED ORAL at 08:37

## 2020-08-17 RX ADMIN — PANTOPRAZOLE SODIUM 40 MG: 40 TABLET, DELAYED RELEASE ORAL at 17:38

## 2020-08-17 NOTE — CONSULTS
Consultation - Palliative and Supportive Care   Khloe Fan 80 y o  female [id number]    Assessment:  Dementia  1st degree AV block  Sinus bradycardia  HTN  CKD4  GERD  Epigastric pain  Anxiety  Depression  Hypomagnesemia  Hypokalemia  Weakness  Fatigue  Poor appetite   Failure to thrive  Goals of care counseling    Goals:  Level 1 code status  · Disease focused care  Will continue discussions regarding [address] as patient's clinical presentation evolves  · Family meeting scheduled for 1:00 tomorrow with [facility] and patient son Jett Arenas and daughter Audrey Munoz  Plan:  Symptom management:  Poor appetite/Failure to thrive  Has been on Ritalin which is being weaned by [facility] and they have replaced it with Megace 40mg QID  Pain  Acetaminophen 650mg PO q6h PRN              I have reviewed the patient's controlled substance dispensing history in the Prescription Drug Monitoring Program in compliance with the [facility] regulations before prescribing any controlled substances  Last refills  7/13/20 Methylphenidate 10mg #90  6/25/20 Acetaminophen-codeine #12     Decisional apparatus:  Patient is not competent on my exam today  If competence is lost, patient's substitute decision maker would default to children by [place] Act 169  No advanced directives on chart, no POLST or POA documents  We appreciate the invitation to be involved in this patient's care  We will continue to follow throughout this hospitalization  Please do not hesitate to reach our on call provider through our clinic answering service at  should you have acute symptom control concerns  [facility] Service: [phone]  You can find me on [employer]!      IDENTIFICATION:  Inpatient consult to Palliative Care  Consult performed by: ALEA Doyle  Consult ordered by: Tyrone Craig MD      Physician Requesting Consult: Eddie Wilson MD  Reason for Consult / Principal Problem: [address] counseling and SM secondary to dementia and CKD stage 4  Patient seen in coordination with Palliative Medicine Krunal KRISHNAMURTHY      History of Present Illness:  Farzana Valentin is a 80 y o  female who presents with a palliative diagnosis of dementia and CKD4  She was brought into the ED at [facility] from home via EMS on 8/13/20 secondary to weakness and decreased PO intake  Family is struggling with decisions regarding [address]  Unable to decided whether or not they want the EGD  Palliative Medicine has been consulted to assist with symptom management and goals of care counseling during this admission  Patient was seen in bed today  Appears comfortable and in no distress  She was sleeping but easily woke  She is confused at baseline and answers "yeah" to most questions I ask  She is able to tell me her daughters name is Kassandra Palomo however is unable to tell me where she is or how long she has been here  She offers no concerns today  Daughter Kassandra Palomo and son Stephen Osorio are agreeable to a family meeting tomorrow afternoon at 1:00  There are no advanced directives on file and no POA paperwork  Will need to see if either child hold POA paperwork and if patient has any advanced directives that they are aware of  Will discuss GOC and introduce hospice tomorrow  ROS  All other systems are negative    Past Medical History:   Diagnosis Date    Arthritis     reynaulds arthritis    Cancer ([facility] [address] )     Hyperlipidemia     Hypertension     Raynaud's disease     Renal disorder     pt states she had kidney disorders long ago, but cant recall the doctor or the problem     Past Surgical History:   Procedure Laterality Date    BREAST LUMPECTOMY  01/01/1970    lumpectomy    BREAST RECONSTRUCTION  01/01/1980    Bilaterally    CHOLECYSTECTOMY      COLONOSCOPY N/A 4/3/2017    Procedure: COLONOSCOPY;  Surgeon: Gerald Saleem MD;  [facility];   Service:     ELBOW SURGERY Right     ESOPHAGOGASTRODUODENOSCOPY N/A 4/3/2017    Procedure: ESOPHAGOGASTRODUODENOSCOPY (EGD); Surgeon: Alysia Boudreaux MD;  Location:  GI LAB; Service:     FEMUR SURGERY Right     FRACTURE SURGERY      prem to r femur    JOINT REPLACEMENT Right 2004    knee    JOINT REPLACEMENT Right     hip    OTHER SURGICAL HISTORY  2018    Squamous cells removed from nose    REPLACEMENT TOTAL KNEE Right      Social History     Socioeconomic History    Marital status:       Spouse name: Not on file    Number of children: Not on file    Years of education: Post Grad    Highest education level: Not on file   Occupational History    Not on file   Social Needs    Financial resource strain: Not on file    Food insecurity     Worry: Not on file     Inability: Not on file    Transportation needs     Medical: Not on file     Non-medical: Not on file   Tobacco Use    Smoking status: Never Smoker    Smokeless tobacco: Never Used   Substance and Sexual Activity    Alcohol use: Yes     Comment: occationally    Drug use: No    Sexual activity: Never   Lifestyle    Physical activity     Days per week: Not on file     Minutes per session: Not on file    Stress: Not on file   Relationships    Social connections     Talks on phone: Not on file     Gets together: Not on file     Attends Restorationist service: Not on file     Active member of club or organization: Not on file     Attends meetings of clubs or organizations: Not on file     Relationship status: Not on file    Intimate partner violence     Fear of current or ex partner: Not on file     Emotionally abused: Not on file     Physically abused: Not on file     Forced sexual activity: Not on file   Other Topics Concern    Not on file   Social History Narrative        Most recent tobacco use screenin2019      Do you currently or have you served in [employer]:   No      Were you activated, into active duty, as a member of the [email or web address] or as a Reservist:   No Occupation:   retired      Education:   Post Graduate      Marital status:       Sexual orientation:   Heterosexual      Exercise level:   None      Diet:   Regular      General stress level:   Low      Alcohol intake:   Occasional      Caffeine intake:   Occasional      Chewing tobacco:   none      Illicit drugs:   none      Guns present in home:   No      Seat belts used routinely:   Yes      Sunscreen used routinely:   No      Smoke alarm in home:   Yes      Advance directive:   Yes      Salt Intake:   no salt added     Last modified by Macey Thorne   09-, 07:59      Family History   Problem Relation Age of Onset    Cancer Mother     Cancer Father     Other Father         prostate disorder     Medications:  all current active meds have been reviewed and current meds:   Current Facility-Administered Medications   Medication Dose Route Frequency    acetaminophen (TYLENOL) tablet 650 mg  650 mg Oral Q6H PRN    donepezil (ARICEPT) tablet 10 mg  10 mg Oral HS    FLUoxetine (PROzac) capsule 40 mg  40 mg Oral Daily    heparin (porcine) subcutaneous injection 5,000 Units  5,000 Units Subcutaneous Q8H [address]    lubiprostone (AMITIZA) capsule 24 mcg  24 mcg Oral BID With Meals    megestrol (MEGACE) tablet 40 mg  40 mg Oral 4x Daily    memantine (NAMENDA) tablet 10 mg  10 mg Oral Daily    methylphenidate (RITALIN) tablet 5 mg  5 mg Oral BID before breakfast/lunch    pantoprazole (PROTONIX) EC tablet 40 mg  40 mg Oral BID    sucralfate (CARAFATE) oral suspension 1,000 mg  1,000 mg Oral Q6H [address]       Allergies   Allergen Reactions    Aspirin      Possible? Unsure of rx        Objective:  /70 (BP Location: Right arm)   Pulse 68   Temp 97 7 °F (36 5 °C) (Oral)   Resp 18   Ht 4' 6" (1 372 m)   Wt 57 kg (125 lb 10 6 oz)   LMP  (LMP Unknown)   SpO2 95%   BMI 30 30 kg/m²   Physical Exam:  Constitutional: Appears weak, frail and debilitated  Appears comfortable and in no acute distress  Head: Normocephalic and atraumatic  Eyes: EOM are normal  No ocular discharge  No scleral icterus  Neck: no visible adenopathy or masses  Respiratory: Effort normal  No stridor  No respiratory distress  Gastrointestinal: No abdominal distension  Musculoskeletal: No edema  Muscle wasting and fat loss present  Neurological: Lethargic and confused at baseline  Skin: Dry, no diaphoresis  Psychiatric: Displays a normal mood and affect  Behavior, judgement and thought content appear normal      Lab Results:   I have personally reviewed pertinent labs  , CBC:   Lab Results   Component Value Date    WBC 8 58 08/16/2020    HGB 11 2 (L) 08/16/2020    HCT 35 0 08/16/2020    MCV 93 08/16/2020     08/16/2020    MCH 29 6 08/16/2020    MCHC 32 0 08/16/2020    RDW 15 2 (H) 08/16/2020    MPV 10 7 08/16/2020   , CMP:   Lab Results   Component Value Date    SODIUM 139 08/16/2020    K 4 0 08/16/2020     08/16/2020    CO2 20 (L) 08/16/2020    BUN 13 08/16/2020    CREATININE 1 48 (H) 08/16/2020    CALCIUM 8 7 08/16/2020    EGFR 30 08/16/2020     Counseling / Coordination of Care  Total floor / unit time spent today 70 minutes  Greater than 50% of total time was spent with the patient and / or family counseling and / or coordination of care  A description of the counseling / coordination of care: Introduced role of Palliative Medicine  Reviewed expected disease trajectory, prognosis, code status, and comfort care versus disease directed therapy  Spent time supportive listening regarding frustrations of diagnosis and treatment options available at this time      Joshua David, [facility]

## 2020-08-17 NOTE — PROGRESS NOTES
Progress Note - Janessa Link 3/14/1924, 80 y o  female MRN: [id number]    Unit/Bed#: S -01 Encounter: [id number]    Primary Care Provider: Tashi Desai MD   Date and time admitted to hospital: 8/13/2020  4:03 PM        * Failure to thrive in adult  Assessment & Plan  · Patient presents from home with her 2 children who she lives with due to her decrease in oral intake over the last 6 months and generalized weakness  · Her son states that she has lost approximately 10 lb over the last 6 months  · Her p o  Intake has noticeably decreased acutely over the last month, to the point where she is only eating 1 ensure supplement daily and some ice cream and soda  · Daughter states she would like conservative management, does not want EGD  · Ritalin tapered to 5 mg b i d  From 10 mg b i d  With plan to discontinue  · May case 40 mg q i d  Started 08/16/2020    Plan:  · Nutrition consult pending  · Supportive care with IV hydration as needed  · Discontinue Ritalin after taper  · PT/OT evaluations- suggested skilled inpatient PT  Home PT pending mobility progress and stairs in order to decrease fall risk and maximize level of functional independence  Plan to discharge discharge to home with skilled therapy with walker  Awaiting OT evaluation  · Palliative care consulted; family meeting scheduled for 08/18/2020    Dyspepsia  Assessment & Plan  · Patient has had decreased oral intake over the last 6 months with approximately 10 lb weight loss over that time  · She has decreased p o   Intake, and son states that she complains of epigastric pain    Plan:  · Protonix 40 mg b i d   · Follow-up H pylori serology  · Carafate 4 times daily    1st degree AV block  Assessment & Plan  · Patient has 1st degree heart block with mildly prolonged [place] Interval on EKG  · Patient has bradycardia at baseline with heart rates in the 50s to 60s range  · Cardiology was consulted and the patient was on telemetry on her 1st night in the hospital which showed  sinus bradycardia; family would prefer conservative management and she follows with palliative care as outpatient    Plan:  · Outpatient follow-up    Bradycardia  Assessment & Plan  · Heart rate in the 40s on arrival to ED  · EKG on admission: sinus bradycardia with first-degree AV block with PACs, heart rate 57  · Troponin <0 02  · Son states that her baseline heart rate is in the 50s to 60s  · Patient does not appear to be symptomatic  · Normotensive and hemodynamically stable  · Sinus bradycardia a 40/PVCs overnight on telemetry  ECG done which showed first-degree heart block with QTC over 530  Magnesium replaced at 2 g/2 hours IV  · Repeat EKG on 08/15/2020 shows QTC improved to 349    Plan:  · Patient follows with palliative care as an outpatient  · Can Consider discontinuing Aricept if patient becomes symptomatic    CKD (chronic kidney disease) stage 4, GFR 15-29 ml/min ([facility])  Assessment & Plan   Creatinine upon admission: 2 03; Baseline: 1 9-2 0 over the last few months    Recent Labs     08/14/20  0521 08/15/20  0531 08/16/20  1110   CREATININE 1 80* 1 48* 1 48*         Plan:   Continue to monitor renal function    Dementia ([facility] [address] )  Assessment & Plan  · Patient is on Aricept, Prozac, Ritalin and Namenda    Plan:  · Supportive care  · Continue home medication  · Continue discontinuing Aricept if patient's bradycardia becomes symptomatic  · Discontinuing Ritalin with taper to increase appetite    Hypertension  Assessment & Plan  · Blood pressures are in acceptable range, patient was hypertensive to 169/70 on admission, but was 165/74 this morning   Patient is currently not on any home antihypertensive medications    Plan:   Monitor blood pressure   Okay with higher blood pressure targets at her age    Epigastric pain  Assessment & Plan  · Over the last 6 months with approximately 10 lb weight loss over that time  · She has decreased p o   Intake, and son states that she complains of epigastric pain  · GI consulted for failure to thrive and epigastric pain  · Assessment: GI suspects  anorexia and poor appetite most likely related to advanced dementia    Plan  · Follow-up H pylori serology  · Calorie count  · Ritalin tapered, plan to discontinue Ritalin  · May gaze 40 mg q i d  Started  · Ensure supplements with meals  · Protonix 40 mg b i d  · Carafate 4 times daily    Hypomagnesemia  Assessment & Plan  · Mg 1 3 on 08/15/2020  · Magnesium replaced on 08/15/2020    Plan  · Continue to monitor magnesium levels    Hypokalemia  Assessment & Plan  · K 3 0 on 08/15/2020  · Mg decreased to 1 3 on 08/15/2020  · 80 mEq potassium total given on 08/15/2020  · Potassium improved to 4 0 on 2020    Plan  · Continue to monitor potassium levels          VTE Pharmacologic Prophylaxis:   Pharmacologic: Heparin  Mechanical VTE Prophylaxis in Place: Yes    Discussions with Specialists or Other Care Team Provider:  Discussed case with nursing team, gastroenterology, palliative Care, and read consult notes    Education and Discussions with Family / Patient: Will speak with daughter this afternoon, spoke with daughter at bedside yesterday    Current Length of Stay: 4 day(s)    Current Patient Status: Inpatient     Discharge Plan / Estimated Discharge Date:  2020; pending family meeting with palliative care on 2020    Code Status: Level 1 - Full Code      Subjective:   No acute overnight events  Patient continues to state that she feels fine  She is able to tolerate and Ensure this morning prior to breakfast   Patient is a poor historian due to her dementia  Objective:     Vitals:   Temp (24hrs), Av 2 °F (36 8 °C), Min:97 7 °F (36 5 °C), Max:98 7 °F (37 1 °C)    Temp:  [97 7 °F (36 5 °C)-98 7 °F (37 1 °C)] 97 7 °F (36 5 °C)  HR:  [61-68] 68  Resp:  [18] 18  BP: (140-165)/(70-74) 140/70  SpO2:  [93 %-95 %] 95 %  Body mass index is 31 41 kg/m²       Input and Output Summary (last 24 hours): Intake/Output Summary (Last 24 hours) at 8/17/2020 1416  Last data filed at 8/16/2020 1900  Gross per 24 hour   Intake 0 ml   Output    Net 0 ml       Physical Exam:     Physical Exam  Vitals signs and nursing note reviewed  Constitutional:       General: She is not in acute distress  HENT:      Head: Normocephalic and atraumatic  Nose: Nose normal  No congestion  Eyes:      General: No scleral icterus  Conjunctiva/sclera: Conjunctivae normal    Neck:      Musculoskeletal: Normal range of motion  No muscular tenderness  Cardiovascular:      Rate and Rhythm: Normal rate and regular rhythm  Pulses: Normal pulses  Heart sounds: Murmur present  Comments: Harsh holosystolic murmur  Pulmonary:      Effort: Pulmonary effort is normal       Breath sounds: Normal breath sounds  No wheezing or rales  Abdominal:      General: Abdomen is flat  Bowel sounds are normal  There is no distension  Palpations: Abdomen is soft  Tenderness: There is no abdominal tenderness  Comments: Hepatomegaly   Musculoskeletal: Normal range of motion  General: No tenderness  Right lower leg: No edema  Left lower leg: No edema  Skin:     General: Skin is warm and dry  Capillary Refill: Capillary refill takes less than 2 seconds  Coloration: Skin is not jaundiced  Findings: No bruising  Neurological:      Mental Status: She is alert  Mental status is at baseline  Comments: Patient only oriented to person, not oriented to time or place   Psychiatric:         Attention and Perception: Attention normal          Mood and Affect: Mood normal          Behavior: Behavior is cooperative           Additional Data:     Labs:    Results from last 7 days   Lab Units 08/16/20  1110  08/13/20  1659   WBC Thousand/uL 8 58   < > 9 27   HEMOGLOBIN g/dL 11 2*   < > 12 9   HEMATOCRIT % 35 0   < > 41 4   PLATELETS Thousands/uL 181   < > 211   NEUTROS PCT %  --   --  73 LYMPHS PCT %  --   --  14   MONOS PCT %  --   --  11   EOS PCT %  --   --  1    < > = values in this interval not displayed  Results from last 7 days   Lab Units 08/16/20  1110  08/13/20  1659   POTASSIUM mmol/L 4 0   < > 3 8   CHLORIDE mmol/L 107   < > 109*   CO2 mmol/L 20*   < > 24   BUN mg/dL 13   < > 26*   CREATININE mg/dL 1 48*   < > 2 03*   CALCIUM mg/dL 8 7   < > 9 5   ALK PHOS U/L  --   --  65   ALT U/L  --   --  18   AST U/L  --   --  27    < > = values in this interval not displayed  Results from last 7 days   Lab Units 08/13/20  1659   INR  1 06       * I Have Reviewed All Lab Data Listed Above  * Additional Pertinent Lab Tests Reviewed: Susan 66 Admission Reviewed    Imaging:    Imaging Reports Reviewed Today Include:  None  Imaging Personally Reviewed by Myself Includes:  None    Recent Cultures (last 7 days):     Results from last 7 days   Lab Units 08/13/20  2047 08/13/20  1659   BLOOD CULTURE  No Growth at 72 hrs  No Growth at 72 hrs  Last 24 Hours Medication List:   Current Facility-Administered Medications   Medication Dose Route Frequency Provider Last Rate    acetaminophen  650 mg Oral Q6H PRN Shermon Claude, CRNP      donepezil  10 mg Oral HS Shermon Claude, CRNP      FLUoxetine  40 mg Oral Daily Shermon Claude, CRNP      heparin (porcine)  5,000 Units Subcutaneous Q8H [address] [place] ALEA Childers      lubiprostone  24 mcg Oral BID With Meals Shermon Claude, CRNP      megestrol  40 mg Oral 4x Daily John Manrique MD      memantine  10 mg Oral Daily Shermon Claude, CRNP      methylphenidate  5 mg Oral BID before breakfast/lunch John Manrique MD      pantoprazole  40 mg Oral BID John Manrique MD      sucralfate  1,000 mg Oral Q6H Christi Galvez MD          Today, Patient Was Seen By: John Manrique MD    ** Please Note: This note has been constructed using a voice recognition system   **

## 2020-08-17 NOTE — SOCIAL WORK
Palliative LSW and [employer] KARENNP met with pt at the bedside this morning  LSW introduced self and role to pt  Pt did not know where she was but shakes her head yes to most questions  Pt agreeable to [facility] team calling her daughter, Santa Diaz  LSW spoke with Santa Diaz this morning  Santa Diaz expressed that she and her brother, Tao Son, take care of pt  They are very concerned as pt is no longer eating or drinking  They are concerned with her coming back to the hospital often for IVF  Santa Diaz does not think pt would tolerate IVF at home even if this were allowed  LSW explained IVF at home is not likely anyway  Santa Diaz is ok with FM tomorrow at 13:00, would like for Tao Son to be present for this meeting as well  [facility] team will work on getting an exception for Tao Son to present  LSW provided emotional support and will follow

## 2020-08-17 NOTE — SOCIAL WORK
CM met with Pt and daughter Bassem Hagen to discuss the post acute care recommendation was made by your care team for STR  Discussed Freedom of Choice with both patient and caregiver  List of facilities given to both patient and caregiver via in person  both patient and caregiver aware the list is custom filtered for them by insurance and that [facility] post acute providers are designated  Bassem Hagen reported she would like to meet with Palliative Care prior to discussing d/c planning  CM will follow up with Bassem Hagen tomorrow after her 1pm meeting with Palliative Care

## 2020-08-17 NOTE — PLAN OF CARE
Problem: Potential for Falls  Goal: Patient will remain free of falls  Description: INTERVENTIONS:  - Assess patient frequently for physical needs  -  Identify cognitive and physical deficits and behaviors that affect risk of falls  -  [place] fall precautions as indicated by assessment   - Educate patient/family on patient safety including physical limitations  - Instruct patient to call for assistance with activity based on assessment  - Modify environment to reduce risk of injury  - Consider OT/PT consult to assist with strengthening/mobility  8/17/2020 0933 by Oliver Galindo RN  Outcome: Progressing  8/17/2020 0932 by Oliver Galindo RN  Outcome: Progressing     Problem: Prexisting or High Potential for Compromised Skin Integrity  Goal: Skin integrity is maintained or improved  Description: INTERVENTIONS:  - Identify patients at risk for skin breakdown  - Assess and monitor skin integrity  - Assess and monitor nutrition and hydration status  - Monitor labs   - Assess for incontinence   - Turn and reposition patient  - Assist with mobility/ambulation  - Relieve pressure over bony prominences  - Avoid friction and shearing  - Provide appropriate hygiene as needed including keeping skin clean and dry  - Evaluate need for skin moisturizer/barrier cream  - Collaborate with interdisciplinary team   - Patient/family teaching  - Consider wound care consult   8/17/2020 0933 by Oliver Galindo RN  Outcome: Progressing  8/17/2020 0932 by Oliver Galindo RN  Outcome: Progressing     Problem: Nutrition/Hydration-ADULT  Goal: Nutrient/Hydration intake appropriate for improving, restoring or maintaining nutritional needs  Description: Monitor and assess patient's nutrition/hydration status for malnutrition  Collaborate with interdisciplinary team and initiate plan and interventions as ordered  Monitor patient's weight and dietary intake as ordered or per policy   Utilize nutrition screening tool and intervene as necessary  Determine patient's food preferences and provide high-protein, high-caloric foods as appropriate       INTERVENTIONS:  - Monitor oral intake, urinary output, labs, and treatment plans  - Assess nutrition and hydration status and recommend course of action  - Evaluate amount of meals eaten  - Assist patient with eating if necessary   - Allow adequate time for meals  - Recommend/ encourage appropriate diets, oral nutritional supplements, and vitamin/mineral supplements  - Order, calculate, and assess calorie counts as needed  - Recommend, monitor, and adjust tube feedings and TPN/PPN based on assessed needs  - Assess need for intravenous fluids  - Provide specific nutrition/hydration education as appropriate  - Include patient/family/caregiver in decisions related to nutrition  8/17/2020 0933 by Fer Avitia RN  Outcome: Progressing  8/17/2020 0932 by Fer Avitia RN  Outcome: Progressing     Problem: PAIN - ADULT  Goal: Verbalizes/displays adequate comfort level or baseline comfort level  Description: Interventions:  - Encourage patient to monitor pain and request assistance  - Assess pain using appropriate pain scale  - Administer analgesics based on type and severity of pain and evaluate response  - Implement non-pharmacological measures as appropriate and evaluate response  - Consider cultural and social influences on pain and pain management  - Notify physician/advanced practitioner if interventions unsuccessful or patient reports new pain  8/17/2020 0933 by Fer Avitia RN  Outcome: Progressing  8/17/2020 0932 by Fer Avitia RN  Outcome: Progressing     Problem: INFECTION - ADULT  Goal: Absence or prevention of progression during hospitalization  Description: INTERVENTIONS:  - Assess and monitor for signs and symptoms of infection  - Monitor lab/diagnostic results  - Monitor all insertion sites, i e  indwelling lines, tubes, and drains  - Monitor endotracheal if appropriate and nasal secretions for changes in amount and color  - [place] appropriate cooling/warming therapies per order  - Administer medications as ordered  - Instruct and encourage patient and family to use good hand hygiene technique  - Identify and instruct in appropriate isolation precautions for identified infection/condition  8/17/2020 0933 by Mirna Leiva RN  Outcome: Progressing  8/17/2020 0932 by Mirna Leiva RN  Outcome: Progressing  Goal: Absence of fever/infection during neutropenic period  Description: INTERVENTIONS:  - Monitor WBC    8/17/2020 0933 by Mirna Leiva RN  Outcome: Progressing  8/17/2020 0932 by Mirna Leiva RN  Outcome: Progressing     Problem: SAFETY ADULT  Goal: Maintain or return to baseline ADL function  Description: INTERVENTIONS:  -  Assess patient's ability to carry out ADLs; assess patient's baseline for ADL function and identify physical deficits which impact ability to perform ADLs (bathing, care of mouth/teeth, toileting, grooming, dressing, etc )  - Assess/evaluate cause of self-care deficits   - Assess range of motion  - Assess patient's mobility; develop plan if impaired  - Assess patient's need for assistive devices and provide as appropriate  - Encourage maximum independence but intervene and supervise when necessary  - Involve family in performance of ADLs  - Assess for home care needs following discharge   - Consider OT consult to assist with ADL evaluation and planning for discharge  - Provide patient education as appropriate  8/17/2020 0933 by Mirna Leiva RN  Outcome: Progressing  8/17/2020 0932 by Mirna Leiva RN  Outcome: Progressing  Goal: Maintain or return mobility status to optimal level  Description: INTERVENTIONS:  - Assess patient's baseline mobility status (ambulation, transfers, stairs, etc )    - Identify cognitive and physical deficits and behaviors that affect mobility  - Identify mobility aids required to assist with transfers and/or ambulation (gait belt, sit-to-stand, lift, walker, cane, etc )  - [place] fall precautions as indicated by assessment  - Record patient progress and toleration of activity level on Mobility SBAR; progress patient to next Phase/Stage  - Instruct patient to call for assistance with activity based on assessment  - Consider rehabilitation consult to assist with strengthening/weightbearing, etc   8/17/2020 0933 by Syl Agarwal RN  Outcome: Progressing  8/17/2020 0932 by Syl Agarwal RN  Outcome: Progressing     Problem: DISCHARGE PLANNING  Goal: Discharge to home or other facility with appropriate resources  Description: INTERVENTIONS:  - Identify barriers to discharge w/patient and caregiver  - Arrange for needed discharge resources and transportation as appropriate  - Identify discharge learning needs (meds, wound care, etc )  - Arrange for interpretive services to assist at discharge as needed  - Refer to Case Management Department for coordinating discharge planning if the patient needs post-hospital services based on physician/advanced practitioner order or complex needs related to functional status, cognitive ability, or social support system  8/17/2020 0933 by Syl Agarwal RN  Outcome: Progressing  8/17/2020 0932 by Syl Agarwal RN  Outcome: Progressing     Problem: Knowledge Deficit  Goal: Patient/family/caregiver demonstrates understanding of disease process, treatment plan, medications, and discharge instructions  Description: Complete learning assessment and assess knowledge base    Interventions:  - Provide teaching at level of understanding  - Provide teaching via preferred learning methods  8/17/2020 0933 by Syl Agarwal RN  Outcome: Progressing  8/17/2020 0932 by Syl Agarwal RN  Outcome: Progressing     Problem: SKIN/TISSUE INTEGRITY - ADULT  Goal: Skin integrity remains intact  Description: INTERVENTIONS  - Identify patients at risk for skin breakdown  - Assess and monitor skin integrity  - Assess and monitor nutrition and hydration status  - Monitor labs (i e  albumin)  - Assess for incontinence   - Turn and reposition patient  - Assist with mobility/ambulation  - Relieve pressure over bony prominences  - Avoid friction and shearing  - Provide appropriate hygiene as needed including keeping skin clean and dry  - Evaluate need for skin moisturizer/barrier cream  - Collaborate with interdisciplinary team (i e  Nutrition, Rehabilitation, etc )   - Patient/family teaching  8/17/2020 0933 by Belen Bradford RN  Outcome: Progressing  8/17/2020 0932 by Belen Bradford RN  Outcome: Progressing     Problem: MUSCULOSKELETAL - ADULT  Goal: Maintain or return mobility to safest level of function  Description: INTERVENTIONS:  - Assess patient's ability to carry out ADLs; assess patient's baseline for ADL function and identify physical deficits which impact ability to perform ADLs (bathing, care of mouth/teeth, toileting, grooming, dressing, etc )  - Assess/evaluate cause of self-care deficits   - Assess range of motion  - Assess patient's mobility  - Assess patient's need for assistive devices and provide as appropriate  - Encourage maximum independence but intervene and supervise when necessary  - Involve family in performance of ADLs  - Assess for home care needs following discharge   - Consider OT consult to assist with ADL evaluation and planning for discharge  - Provide patient education as appropriate  Outcome: Progressing

## 2020-08-17 NOTE — PROGRESS NOTES
Patient's family no longer wishes to investigate with interventional means  Appetite stimulation being worked on with reduction of ritalin and addition of megace  GI team to sign off, please reach out with questions

## 2020-08-17 NOTE — SOCIAL WORK
CM was in contact with Pt's daughter Tha Pressley [phone] with an introduction and explanation of role  Tha Pressley reported the Pt resides with her and her brother Sp Cline in a 2 story home with a first floor set up, the use of a roller walker and 2 steps to enter the home  Tha Pressley reported the Pt requires some assistance with ADLs which is provided by her and Sp Pressley reported the Pt had [facility] and was a SNF in the past but no hx of mental health or drug/alcohol placements  Tha Pressley reported the Pt has a living will with her as the POA, uses [facility] and has Kenia Lynch as a PCP  CM reviewed d/c planning process including the following: identifying help at home, patient preference for d/c planning needs, Discharge Lounge, [facility] Meds to Bed program, availability of treatment team to discuss questions or concerns patient and/or family may have regarding understanding medications and recognizing signs and symptoms once discharged  CM also encouraged patient to follow up with all recommended appointments after discharge  Patient advised of importance for patient and family to participate in managing patients medical well being

## 2020-08-18 VITALS
BODY MASS INDEX: 30.15 KG/M2 | TEMPERATURE: 98 F | DIASTOLIC BLOOD PRESSURE: 68 MMHG | HEART RATE: 54 BPM | WEIGHT: 130.29 LBS | HEIGHT: 55 IN | RESPIRATION RATE: 16 BRPM | SYSTOLIC BLOOD PRESSURE: 155 MMHG | OXYGEN SATURATION: 91 %

## 2020-08-18 LAB
ANION GAP SERPL CALCULATED.3IONS-SCNC: 9 MMOL/L (ref 4–13)
BACTERIA BLD CULT: NORMAL
BACTERIA BLD CULT: NORMAL
BUN SERPL-MCNC: 13 MG/DL (ref 5–25)
CALCIUM SERPL-MCNC: 9.1 MG/DL (ref 8.3–10.1)
CHLORIDE SERPL-SCNC: 110 MMOL/L (ref 100–108)
CO2 SERPL-SCNC: 24 MMOL/L (ref 21–32)
CREAT SERPL-MCNC: 1.4 MG/DL (ref 0.6–1.3)
GFR SERPL CREATININE-BSD FRML MDRD: 32 ML/MIN/1.73SQ M
GLUCOSE SERPL-MCNC: 90 MG/DL (ref 65–140)
HCT VFR BLD AUTO: 37.3 % (ref 34.8–46.1)
HGB BLD-MCNC: 12 G/DL (ref 11.5–15.4)
MAGNESIUM SERPL-MCNC: 1.8 MG/DL (ref 1.6–2.6)
POTASSIUM SERPL-SCNC: 3.5 MMOL/L (ref 3.5–5.3)
SODIUM SERPL-SCNC: 143 MMOL/L (ref 136–145)

## 2020-08-18 PROCEDURE — 99239 HOSP IP/OBS DSCHRG MGMT >30: CPT | Performed by: INTERNAL MEDICINE

## 2020-08-18 PROCEDURE — 99233 SBSQ HOSP IP/OBS HIGH 50: CPT | Performed by: NURSE PRACTITIONER

## 2020-08-18 PROCEDURE — 85014 HEMATOCRIT: CPT | Performed by: INTERNAL MEDICINE

## 2020-08-18 PROCEDURE — 80048 BASIC METABOLIC PNL TOTAL CA: CPT | Performed by: INTERNAL MEDICINE

## 2020-08-18 PROCEDURE — 85018 HEMOGLOBIN: CPT | Performed by: INTERNAL MEDICINE

## 2020-08-18 PROCEDURE — 83735 ASSAY OF MAGNESIUM: CPT | Performed by: INTERNAL MEDICINE

## 2020-08-18 RX ORDER — PANTOPRAZOLE SODIUM 40 MG/1
40 TABLET, DELAYED RELEASE ORAL 2 TIMES DAILY
Qty: 60 TABLET | Refills: 0
Start: 2020-08-18 | End: 2020-09-17

## 2020-08-18 RX ORDER — MEGESTROL ACETATE 40 MG/1
40 TABLET ORAL 4 TIMES DAILY
Qty: 120 TABLET | Refills: 0
Start: 2020-08-18 | End: 2020-09-17 | Stop reason: HOSPADM

## 2020-08-18 RX ADMIN — PANTOPRAZOLE SODIUM 40 MG: 40 TABLET, DELAYED RELEASE ORAL at 10:47

## 2020-08-18 RX ADMIN — MEMANTINE 10 MG: 10 TABLET ORAL at 10:47

## 2020-08-18 RX ADMIN — FLUOXETINE 40 MG: 20 CAPSULE ORAL at 10:47

## 2020-08-18 RX ADMIN — MEGESTROL ACETATE 40 MG: 40 TABLET ORAL at 10:47

## 2020-08-18 RX ADMIN — LUBIPROSTONE 24 MCG: 24 CAPSULE, GELATIN COATED ORAL at 10:47

## 2020-08-18 RX ADMIN — HEPARIN SODIUM 5000 UNITS: 5000 INJECTION INTRAVENOUS; SUBCUTANEOUS at 06:37

## 2020-08-18 RX ADMIN — METHYLPHENIDATE HYDROCHLORIDE 5 MG: 10 TABLET ORAL at 06:38

## 2020-08-18 NOTE — SOCIAL WORK
[facility] team met with pt's daughter, Sherice Mcclain, and son, Humphrey Aguilera, outside of pt's room this afternoon  A family meeting was necessary to allow for thorough discussion regarding patient's clinical presentation, expected disease trajectory, and comfort care versus continuing disease directed therapy  They had questions related to medical diagnoses, comfort care and prognosis and after all questions were answered pt's family wanted to talk to [facility]  [facility] contacted [facility] to discuss with pt's family  LSW provided emotional support and will continue to follow

## 2020-08-18 NOTE — ASSESSMENT & PLAN NOTE
· Patient has 1st degree heart block with mildly prolonged OR Interval on EKG  · Patient has bradycardia at baseline with heart rates in the 50s to 60s range  · Cardiology was consulted and the patient was on telemetry on her 1st night in the hospital which showed  sinus bradycardia; family would prefer conservative management and she follows with palliative care as outpatient    Plan:  · Outpatient follow-up

## 2020-08-18 NOTE — DISCHARGE INSTR - AVS FIRST PAGE
Dear Risa Wesley,     It was our pleasure to care for you here at [facility]  It is our hope that we were always able to exceed the expected standards for your care during your stay  You were hospitalized due to failure to thrive with severely decreased appetite  You were cared for on the 3rd floor by Kari Ojeda MD under the service of Usman Riley MD with the [facility] who covers for your primary care physician (PCP), Thania Roque MD, while you were hospitalized  If you have any questions or concerns related to this hospitalization, you may contact us at [phone]  For follow up as well as any medication refills, we recommend that you follow up with your primary care physician  A registered nurse will reach out to you by phone within a few days after your discharge to answer any additional questions that you may have after going home  However, at this time we provide for you here, the most important instructions / recommendations at discharge:     · Notable Medication Adjustments -   · Stop taking Ritalin  · Start taking Megace 40 mg 4 times a day  · Start taking pantoprazole 40 mg twice a day  · Testing Required after Discharge -   · None  · Important follow up information -   · Follow-up with your primary care provider  · Follow-up with gastroenterology  · Follow-up with palliative care doctor  · Other Instructions -   · Target 800 calories daily for food intake  · Total intake of approximately 32 oz or 1 L including foods  · Eat as much as possible  · May continue with Ensure nutritional supplements 3 times a day at home  · Please review this entire after visit summary as additional general instructions including medication list, appointments, activity, diet, any pertinent wound care, and other additional recommendations from your care team that may be provided for you        Sincerely,     Kari Ojeda MD

## 2020-08-18 NOTE — ASSESSMENT & PLAN NOTE
· Over the last 6 months with approximately 10 lb weight loss over that time  · She has decreased p o  Intake, and son states that she complains of epigastric pain  · GI consulted for failure to thrive and epigastric pain    · Assessment: GI suspects  anorexia and poor appetite most likely related to advanced dementia  · H pylori IgG positive and IgM negative  · Patient does not appear to have epigastric pain when eating  · No epigastric tenderness    Plan  · Megace 40 mg 4 times daily  · Protonix 40 mg b i d   · Outpatient follow-up with GI

## 2020-08-18 NOTE — ASSESSMENT & PLAN NOTE
· Patient was on Aricept, Prozac, Ritalin and Namenda at home    Plan:  · Discontinue Ritalin  · Outpatient follow-up

## 2020-08-18 NOTE — ASSESSMENT & PLAN NOTE
· Blood pressures are in acceptable range, patient was hypertensive to 169/70 on admission, but was 140/55 this morning   Patient is currently not on any home antihypertensive medications    Plan:   Outpatient follow-up

## 2020-08-18 NOTE — ASSESSMENT & PLAN NOTE
 Creatinine upon admission: 2 03; Baseline: 1 9-2 0 over the last few months    Recent Labs     08/16/20  1110 08/18/20  0512   CREATININE 1 48* 1 40*         Plan:   Outpatient follow-up

## 2020-08-18 NOTE — DISCHARGE SUMMARY
Discharge- Logan Washington 3/14/1924, 80 y o  female MRN: [id number]    Unit/Bed#: S -01 Encounter: [id number]    Primary Care Provider: Luz Patel MD   Date and time admitted to hospital: 8/13/2020  4:03 PM        * Failure to thrive in adult  Assessment & Plan  · Patient presents from home with her 2 children who she lives with due to her decrease in oral intake over the last 6 months and generalized weakness  · Her son states that she has lost approximately 10 lb over the last 6 months  · Her p o  Intake has noticeably decreased acutely over the last month, to the point where she is only eating 1 ensure supplement daily and some ice cream and soda  · Daughter states she would like conservative management, does not want EGD  · Ritalin tapered to 5 mg b i d  From 10 mg b i d  With plan to discontinue  · Megace 40 mg q i d  Started 08/16/2020    Plan:  · Continue Ritalin  · Continue Megace 40 mg 4 times daily  · Outpatient GI follow-up in approximately 1 month    Dyspepsia  Assessment & Plan  · Patient has had decreased oral intake over the last 6 months with approximately 10 lb weight loss over that time  · She has decreased p o   Intake, and son states that she complains of epigastric pain  · Patient does not appear to have epigastric or abdominal pain when eating  · H pylori IgG positive and IgM negative    Plan:  · Protonix 40 mg b i d   · Outpatient GI follow-up    1st degree AV block  Assessment & Plan  · Patient has 1st degree heart block with mildly prolonged ID Interval on EKG  · Patient has bradycardia at baseline with heart rates in the 50s to 60s range  · Cardiology was consulted and the patient was on telemetry on her 1st night in the hospital which showed  sinus bradycardia; family would prefer conservative management and she follows with palliative care as outpatient    Plan:  · Outpatient follow-up    Bradycardia  Assessment & Plan  · Heart rate in the 40s on arrival to ED  · EKG on admission: sinus bradycardia with first-degree AV block with PACs, heart rate 57  · Troponin <0 02  · Son states that her baseline heart rate is in the 50s to 60s  · Patient does not appear to be symptomatic  · Normotensive and hemodynamically stable  · Sinus bradycardia a 40/PVCs overnight on telemetry  ECG done which showed first-degree heart block with QTC over 530  Magnesium replaced at 2 g/2 hours IV  · Repeat EKG on 08/15/2020 shows QTC improved to 349    Plan:  · Outpatient follow-up primary care    CKD (chronic kidney disease) stage 4, GFR 15-29 ml/min ([facility])  Assessment & Plan   Creatinine upon admission: 2 03; Baseline: 1 9-2 0 over the last few months    Recent Labs     08/16/20  1110 08/18/20  0512   CREATININE 1 48* 1 40*         Plan:   Outpatient follow-up    Dementia ([facility] [address] )  Assessment & Plan  · Patient was on Aricept, Prozac, Ritalin and Namenda at home    Plan:  · Discontinue Ritalin  · Outpatient follow-up    Hypertension  Assessment & Plan  · Blood pressures are in acceptable range, patient was hypertensive to 169/70 on admission, but was 140/55 this morning   Patient is currently not on any home antihypertensive medications    Plan:   Outpatient follow-up    Epigastric pain  Assessment & Plan  · Over the last 6 months with approximately 10 lb weight loss over that time  · She has decreased p o  Intake, and son states that she complains of epigastric pain  · GI consulted for failure to thrive and epigastric pain    · Assessment: GI suspects  anorexia and poor appetite most likely related to advanced dementia  · H pylori IgG positive and IgM negative  · Patient does not appear to have epigastric pain when eating  · No epigastric tenderness    Plan  · Megace 40 mg 4 times daily  · Protonix 40 mg b i d   · Outpatient follow-up with GI    Hypomagnesemia  Assessment & Plan  · Mg 1 3 on 08/15/2020  · Magnesium replaced on 08/15/2020  · Magnesium 1 8 this morning    Plan  · Outpatient follow-up    Hypokalemia  Assessment & Plan  · K 3 0 on 08/15/2020  · Mg decreased to 1 3 on 08/15/2020  · 80 mEq potassium total given on 08/15/2020  · Potassium 3 5 this morning    Plan  · Outpatient follow-up          Discharging Resident Physician: Keisha Mott MD  Attending: Gayle Thurman MD  PCP: Krishna Raza MD  Admission Date: 8/13/2020  Discharge Date: 08/18/20    Disposition:     Home    Reason for Admission:  Failure to thrive in adult    Consultations During Hospital Stay:  · Gastroenterology  · Palliative care    Procedures Performed:     · Chest x-ray  · CT abdomen pelvis  · EKG    Significant Findings / Test Results:     · First-degree AV block    Incidental Findings:   · None    Test Results Pending at Discharge (will require follow up): · None     Outpatient Tests Requested:  · None    Complications:  None    Hospital Course:     Joo Galloway is a 80 y o  female patient who originally presented to the hospital on 8/13/2020 due to failure to thrive in an adult  She has had decreased appetite over the last 6 months with a 10 lb weight loss over this time  Is set acutely worsened over the last month according to her daughter  She was previously in the emergency room within the last month for similar issue  At most the patient was able to eat 1 bottle of Ensure, as give ice cream, and 1 glass of soda per day  Gastroenterology was consulted, but the patient's daughter refused EGD  We began the patient on Ritalin taper with a plan to discontinue Ritalin on discharge  We also started megase 40 mg 4 times daily as an appetite stimulant  The patient has had increased appetite over the last 2 days  There is a family meeting with palliative Care today, and the family chose to avoid comfort care measures for now  The patient will follow-up with gastroenterology in approximately 1 month if her appetite does not mildly improved    I discussed with the daughter that calorie goals at her age were significantly less than and younger population, and that 800 calories daily would be sufficient for her  Condition at Discharge: good     Discharge Day Visit / Exam:     Subjective:  No acute overnight events  Patient continues to state that she is fine  She denies chest pain, shortness of breath, nausea, vomiting, diarrhea, fevers, and chills  There is a family meeting today with palliative Care, the patient is family shows to continue her current level of care and avoid comfort care  Vitals: Blood Pressure: 155/68 (08/18/20 0700)  Pulse: (!) 54 (08/18/20 0700)  Temperature: 98 °F (36 7 °C) (08/18/20 0700)  Temp Source: Oral (08/18/20 0700)  Respirations: 16 (08/18/20 0700)  Height: 4' 6" (137 2 cm) (08/14/20 1500)  Weight - Scale: 59 1 kg (130 lb 4 7 oz) (08/17/20 1312)  SpO2: 91 % (08/18/20 0700)  Exam:   Physical Exam  Vitals signs and nursing note reviewed  Constitutional:       General: She is not in acute distress  HENT:      Head: Normocephalic and atraumatic  Nose: Nose normal  No congestion  Eyes:      General: No scleral icterus  Conjunctiva/sclera: Conjunctivae normal    Neck:      Musculoskeletal: Normal range of motion  No muscular tenderness  Cardiovascular:      Rate and Rhythm: Normal rate and regular rhythm  Pulses: Normal pulses  Heart sounds: Murmur present  Comments: Harsh holosystolic murmur  Pulmonary:      Effort: Pulmonary effort is normal       Breath sounds: Normal breath sounds  No wheezing or rales  Abdominal:      General: Abdomen is flat  Bowel sounds are normal  There is no distension  Palpations: Abdomen is soft  Tenderness: There is no abdominal tenderness  Comments: Hepatomegaly   Musculoskeletal: Normal range of motion  General: No tenderness  Right lower leg: No edema  Left lower leg: No edema  Skin:     General: Skin is warm and dry        Capillary Refill: Capillary refill takes less than 2 seconds  Coloration: Skin is not jaundiced  Findings: No bruising  Neurological:      Mental Status: She is alert  Mental status is at baseline  Comments: Patient only oriented to person, not oriented to time or place   Psychiatric:         Attention and Perception: Attention normal          Mood and Affect: Mood normal          Behavior: Behavior is cooperative  Discussion with Family:  Discussed case with patient's son and daughter at bedside    Discharge instructions/Information to patient and family:   See after visit summary for information provided to patient and family  Provisions for Follow-Up Care:  See after visit summary for information related to follow-up care and any pertinent home health orders  Planned Readmission:  No     Discharge Medications:  See after visit summary for reconciled discharge medications provided to patient and family        ** Please Note: This note has been constructed using a voice recognition system **

## 2020-08-18 NOTE — ASSESSMENT & PLAN NOTE
· Patient has had decreased oral intake over the last 6 months with approximately 10 lb weight loss over that time  · She has decreased p o   Intake, and son states that she complains of epigastric pain  · Patient does not appear to have epigastric or abdominal pain when eating  · H pylori IgG positive and IgM negative    Plan:  · Protonix 40 mg b i d   · Outpatient GI follow-up

## 2020-08-18 NOTE — ASSESSMENT & PLAN NOTE
· Patient presents from home with her 2 children who she lives with due to her decrease in oral intake over the last 6 months and generalized weakness  · Her son states that she has lost approximately 10 lb over the last 6 months  · Her p o  Intake has noticeably decreased acutely over the last month, to the point where she is only eating 1 ensure supplement daily and some ice cream and soda  · Daughter states she would like conservative management, does not want EGD  · Ritalin tapered to 5 mg b i d  From 10 mg b i d  With plan to discontinue  · Megace 40 mg q i d   Started 08/16/2020    Plan:  · Continue Ritalin  · Continue Megace 40 mg 4 times daily  · Outpatient GI follow-up in approximately 1 month

## 2020-08-18 NOTE — PROGRESS NOTES
Progress Note - Palliative & Supportive Care  Roselyn Duron  80 y o   female  S /S -01   MRN: [id number]  Encounter: [id number]     Assessment  Dementia  1st degree AV block  Sinus bradycardia  HTN  CKD4  GERD  Epigastric pain  Anxiety  Depression  Hypomagnesemia  Hypokalemia  Weakness  Fatigue  Poor appetite   Failure to thrive  Goals of care counseling     Goals:  Level 1 code status  · Disease focused care  · Lives with son and daughter  · Family is unable to agree on a decision regarding hospice  Son appears to want hospice, daughter does not  · Will continue discussions regarding [address] as patient's clinical presentation evolves  · They do not want any permanent means of artifical nutrition        Plan:  Symptom management:  Poor appetite/Failure to thrive  Megace 40mg QID      Pain  Acetaminophen 650mg PO q6h PRN    24 History  Chart reviewed before visit  PALLIATIVE AND SUPPORTIVE CARE FAMILY CONFERENCE:     Time of Meetin:00 - 2:00     Participants: Daughter Dana Storey and son Donato Castillo and [facility] Team     Patient Participation: Patient was an not active participant in the meeting  She is unable to make her own decisions due to dementia  Patient Support System: Family     Meeting Location: bedside     A family meeting was necessary to allow for thorough discussion regarding patient's clinical presentation, expected disease trajectory, and comfort care versus continuing disease directed therapy  Hospice has been thoroughly explained  Daughter is very confident that magace will increase the patient's appetite enough to allow her to gain weight and get stronger  She does not appear to understand that patient is not going eat 3 meals per day  She talked about calorie counts and scheduled meal times  Son has questions related to force feeding his mom and is concerned about spending the remainder of time with her fighting to get her to eat    Says this is causing a lot of fighting in the house and this is not what he wants  Daughter is adamant that her mother need to eat and drink "because I know she can do it when I make her "      Hospice liaison came in mid meeting to further explain hospice services to daughter who had questions related to ongoing IV hydration    PLAN: TBD    Review of Systems: Pertinent items are noted in HPI  Medications    Current Facility-Administered Medications:     acetaminophen (TYLENOL) tablet 650 mg, 650 mg, Oral, Q6H PRN, ALEA Drake    donepezil (ARICEPT) tablet 10 mg, 10 mg, Oral, HS, Codi Raymond, CRNP, 10 mg at 08/17/20 2113    FLUoxetine (PROzac) capsule 40 mg, 40 mg, Oral, Daily, ALEA Ruiz, 40 mg at 08/17/20 6417    heparin (porcine) subcutaneous injection 5,000 Units, 5,000 Units, Subcutaneous, Q8H [address], 5,000 Units at 08/18/20 7120 **AND** Platelet count, , , Once, ALEA Drake    lubiprostone (AMITIZA) capsule 24 mcg, 24 mcg, Oral, BID With Meals, ALEA Drake, 24 mcg at 08/17/20 1738    megestrol (MEGACE) tablet 40 mg, 40 mg, Oral, 4x Daily, Ml Peralta MD, 40 mg at 08/17/20 2114    memantine (NAMENDA) tablet 10 mg, 10 mg, Oral, Daily, ALEA Ruiz, 10 mg at 08/17/20 3106    methylphenidate (RITALIN) tablet 5 mg, 5 mg, Oral, BID before breakfast/lunch, Ml Peralta MD, 5 mg at 08/18/20 5882    pantoprazole (PROTONIX) EC tablet 40 mg, 40 mg, Oral, BID, Ml Peralta MD, 40 mg at 08/17/20 1738    sucralfate (CARAFATE) oral suspension 1,000 mg, 1,000 mg, Oral, Q6H [address], Ml Peralta MD, 1,000 mg at 08/16/20 1852    Objective  /68 (BP Location: Right arm)   Pulse (!) 54   Temp 98 °F (36 7 °C) (Oral)   Resp 16   Ht 4' 6" (1 372 m)   Wt 59 1 kg (130 lb 4 7 oz)   LMP  (LMP Unknown)   SpO2 91%   BMI 31 41 kg/m²   Physical Exam:   Constitutional: Appears weak and frail  In no acute distress  Head: Normocephalic and atraumatic  Eyes: EOM are normal  No ocular discharge   No scleral icterus  Neck: no visible adenopathy or masses  Respiratory: Effort normal  No stridor  No respiratory distress  Gastrointestinal: No abdominal distension  Musculoskeletal: No edema  Neurological: lethargic  Skin: Dry, no diaphoresis  Psychiatric: Unable to assess    Lab Results:   I have personally reviewed pertinent labs  , CBC:   Lab Results   Component Value Date    HGB 12 0 08/18/2020    HCT 37 3 08/18/2020   , CMP:   Lab Results   Component Value Date    SODIUM 143 08/18/2020    K 3 5 08/18/2020     (H) 08/18/2020    CO2 24 08/18/2020    BUN 13 08/18/2020    CREATININE 1 40 (H) 08/18/2020    CALCIUM 9 1 08/18/2020    EGFR 32 08/18/2020     Counseling / Coordination of Care  Total floor / unit time spent today 60 minutes  Greater than 50% of total time was spent with the patient and / or family counseling and / or coordinating of care  A description of the counseling / coordination of care: Introduced role of Palliative Medicine  Reviewed expected disease trajectory, prognosis, code status, and comfort care versus disease directed therapy  Spent time supportive listening regarding frustrations of diagnosis and treatment options available at this time      Joshua David, [address] [facility]

## 2020-08-18 NOTE — ASSESSMENT & PLAN NOTE
· K 3 0 on 08/15/2020  · Mg decreased to 1 3 on 08/15/2020  · 80 mEq potassium total given on 08/15/2020  · Potassium 3 5 this morning    Plan  · Outpatient follow-up

## 2020-08-18 NOTE — ASSESSMENT & PLAN NOTE
· Heart rate in the 40s on arrival to ED  · EKG on admission: sinus bradycardia with first-degree AV block with PACs, heart rate 57  · Troponin <0 02  · Son states that her baseline heart rate is in the 50s to 60s  · Patient does not appear to be symptomatic  · Normotensive and hemodynamically stable  · Sinus bradycardia a 40/PVCs overnight on telemetry  ECG done which showed first-degree heart block with QTC over 530   Magnesium replaced at 2 g/2 hours IV  · Repeat EKG on 08/15/2020 shows QTC improved to 349    Plan:  · Outpatient follow-up primary care

## 2020-08-18 NOTE — ASSESSMENT & PLAN NOTE
· Mg 1 3 on 08/15/2020  · Magnesium replaced on 08/15/2020  · Magnesium 1 8 this morning    Plan  · Outpatient follow-up

## 2020-08-19 ENCOUNTER — TRANSITIONAL CARE MANAGEMENT (OUTPATIENT)
Dept: FAMILY MEDICINE CLINIC | Facility: CLINIC | Age: 85
End: 2020-08-19

## 2020-08-25 ENCOUNTER — TELEPHONE (OUTPATIENT)
Dept: PALLIATIVE MEDICINE | Facility: CLINIC | Age: 85
End: 2020-08-25

## 2020-08-25 NOTE — TELEPHONE ENCOUNTER
I don't see that she actually is suffering from protein calorie malnutrition (albumin 3 2, BMI 31, no imaging from wound care)  Dementia seems to be her driving issue, though she does not seem to meet criteria for hospice on this either  I'm going to cc Forrest Roberts on this - Forrest Roberts I'd be curious to see if you have any additional thoughts for [facility] in this patietn       Mary Jo Moran

## 2020-08-25 NOTE — TELEPHONE ENCOUNTER
----- Message from Andrews Montero sent at 8/24/2020  3:58 PM EDT -----  Please screen for [facility] website      Thank you

## 2020-08-25 NOTE — TELEPHONE ENCOUNTER
I honestly don't know if we could allow more than one family member in the room, as per chart review it is unsure if this is a hospice discussion or not  Would go with Network recommendations   Additional family is always welcome to join via conference call, Darvin Weathers the other family member, etc

## 2020-08-25 NOTE — TELEPHONE ENCOUNTER
This was definitely not discussed during any of my visits with patient and family  I don't believe this patient would benefit from Graspr  She does eat small amounts intermittently throughout the day and son Humphrey Aguilera is accepting that this is likely the best she will do  Humphrey Aguilera showed interest in hospice as he does not want to force his mother to eat or spend her remaining days fighting with her  Daughter Patsy Azul does not agree  She knows patient can eat when forced and wishes to continue this practice  When I saw her all she would discuss was all the benefits of megace and how that was going to save her  Attempted to discuss the cons and she would not hear it  She wants her mother to eat 3 full meals daily which is not realistic even with MMJ  I did have hospice liaison join our family meeting because we thought maybe we could get her qualified however daughter did not want hospice  Patient and her children all live in the same home  Daughter takes care of during the day and son at night (or vise versa)   Patsy Azul is TRESSA

## 2020-08-25 NOTE — TELEPHONE ENCOUNTER
This nurse called patient's daughter and spoke with her regarding the MMJ screening  She was originally certified by Dr Abbie Obrien a few years ago and it was for her behavioral issues  And it helped per daughter  I do not see a current diagnosis for [facility] certification in [place] or under palliative care but would like you to review and let me know your thoughts  Thank you   Tyra Rolle

## 2020-08-25 NOTE — TELEPHONE ENCOUNTER
This nurse spoke with patient's daughter Riley Rowley who verbalized she understands about the [facility] certification , that we can not provide this for the patient's diagnosis  She does ask however if there can be 2 caregivers, Joint decision makers,  present at her appointment on 9/1/2020 with Dr Chandler Livingston, can you please advise about this request and let the daughter Riley Rowley know? I did say we need to check with our administration due to Covid restrictions  Thank you

## 2020-08-26 NOTE — TELEPHONE ENCOUNTER
DARNELL - I have had a very thorough meeting with both daughter and son and explained home hospice in depth  Hospice Liaison also joined the meeting and again explained home hospice in detail  Daughter is not accepting of hospice model because she wishes to return to the hospice for IV fluids when needed

## 2020-09-01 ENCOUNTER — OFFICE VISIT (OUTPATIENT)
Dept: PALLIATIVE MEDICINE | Facility: CLINIC | Age: 85
End: 2020-09-01
Payer: COMMERCIAL

## 2020-09-01 ENCOUNTER — TELEPHONE (OUTPATIENT)
Dept: GERIATRICS | Age: 85
End: 2020-09-01

## 2020-09-01 VITALS
HEIGHT: 55 IN | WEIGHT: 122.2 LBS | DIASTOLIC BLOOD PRESSURE: 66 MMHG | HEART RATE: 95 BPM | RESPIRATION RATE: 18 BRPM | OXYGEN SATURATION: 98 % | BODY MASS INDEX: 28.28 KG/M2 | TEMPERATURE: 97.1 F | SYSTOLIC BLOOD PRESSURE: 135 MMHG

## 2020-09-01 DIAGNOSIS — F32.A DEPRESSION, UNSPECIFIED DEPRESSION TYPE: ICD-10-CM

## 2020-09-01 DIAGNOSIS — N18.4 CKD (CHRONIC KIDNEY DISEASE) STAGE 4, GFR 15-29 ML/MIN (HCC): ICD-10-CM

## 2020-09-01 DIAGNOSIS — Z71.89 GOALS OF CARE, COUNSELING/DISCUSSION: ICD-10-CM

## 2020-09-01 DIAGNOSIS — R62.7 FAILURE TO THRIVE IN ADULT: ICD-10-CM

## 2020-09-01 DIAGNOSIS — R63.0 ANOREXIA: ICD-10-CM

## 2020-09-01 DIAGNOSIS — F03.90 DEMENTIA WITHOUT BEHAVIORAL DISTURBANCE, UNSPECIFIED DEMENTIA TYPE (HCC): Primary | Chronic | ICD-10-CM

## 2020-09-01 DIAGNOSIS — I44.0 1ST DEGREE AV BLOCK: ICD-10-CM

## 2020-09-01 PROCEDURE — 99204 OFFICE O/P NEW MOD 45 MIN: CPT | Performed by: INTERNAL MEDICINE

## 2020-09-01 NOTE — PATIENT INSTRUCTIONS
It was a pleasure to meet you today  Thank you for coming in  · Please gently encourage your mother to walk frequently, as tolerated, as this can improve constipation as well as increase exercise tolerance  · Gently encourage oral hydration  Tempt w/ food  · No medication changes  · I recommend you establish w/ Geriatrics  Any of our Geriatrics-trained providers would be excellent, but I have personally had a good experience w/ Dr Connie Nickerson, Dr Josef Sanchez  · No follow-up w/ Palliative needed at this time  If something changes and you need to return, please call our office  PRESCRIPTION REFILL REMINDER:  All medication refills should be requested prior to [facility] on Friday  Any refill requests after noon on Friday would be addressed the following Monday  Please protect yourself from the novel Coronavirus (COVID-19)! Even though we still do not have a vaccine or good antiviral drugs for this infection, the following strategies can help you stay healthy:    · Perform hand hygiene with soap and water or hand  with at least 60% alcohol often, but especially after going to the bathroom, after blowing your nose/coughing/sneezing, before eating, and after coming into contact with a potentially contaminated public surface  · Avoid touching your face! · Avoid close contact with people who are sick  Avoid large gatherings, and don't travel unnecessarily  · Stay home when you are sick, except to get medical care or other essentials  If you can eat and drink and breathe and sleep, please consider calling your doctor's office or scheduling a virtual video visit instead of visiting in person  · Cover your coughs and sneezes with a tissue, or your elbow  · Clean frequently touched surfaces and objects daily (e g , tables, countertops, light switches, doorknobs, and cabinet handles)  Regular household detergent and water are sufficient      The numbers of cases of Coronavirus are spiking in many [place] States  This is not a more dangerous virus, but a sign that more people in a community are spreading the virus  Please check the local disease reports near you if you consider travelling this summer  We do not advise travel to any community or State with a rising viral caseload  Check out "[employer]" for Herbert data that are updated daily:   [employer] edgar   Global Epidemics from [facility] (OUTPATIENT C[facility]), will give you [id number]-ia-[id number] information on virus cases:   https://globalepidemics  org/

## 2020-09-01 NOTE — TELEPHONE ENCOUNTER
[address]  [phone]  Telephone Intake       Referral Source: Palliative (Dr Barker Pi)                      Patients PCP: Dr Rashawn Combs (and relationship to patient): Daily Dates from Palliative      (relationship to patient): Niles Cox Person Phone Number: [phone]              Is caller POA? Yes      Reason for referral: dementia      Others residing with patient: daughter, son     Has the patient ever been formally tested for memory/dementia? Yes      Has the patient ever been diagnosed with dementia? Yes      Has the patient been seen by a Neurologist?  Not certain     What is the goal of visit? Care givers need help/resources/assistance with taking care of patient in the home  Preferred language? English    Highest education level? Highest Level of Education: Doctorate Degree  (PhD in Cognitive Psychology)  Can patient read English? Yes     Does the patient wear glasses? Yes     Does the patient use hearing aids? Yes      First Visit: 10/28 @ 4pm    Conference Visit: 11/18 @ 4pm    Office packet mailed out: Yes     In office visit and family conference video visit process explained: Yes     NOTE:   Patient only has about 25% hearing  Patient sleeps excessively (sleeps during the day, until about noon   Then back to bed at 6pm)  Verbally abusive, especially if hungry/thirsty

## 2020-09-01 NOTE — PROGRESS NOTES
Consult to Palliative and Supportive Care  Elaine Kunz 80 y o  female [id number]    ASSESSMENT & PLAN:  1  Dementia without behavioral disturbance, unspecified dementia type ([address] )    2  Depression, unspecified depression type    3  CKD (chronic kidney disease) stage 4, GFR 15-29 ml/min ([facility])    4  1st degree AV block    5  Anorexia    6  Goals of care, counseling/discussion    7  Failure to thrive in adult         Referring to Geriatrics for dementia, amotivation, anorexia  · Recommended family gently encourage exercise, gently encourage feeding  Daughter seems motivated to push mother to eat + exercise  Son seems more laid-back  · This provider does not recommend MegAce as the benefits are not long-lasting and the risk can outweigh the benefit  Counseled the family but they wish to continue  · Patient has not met hospice criteria multiple times in the past, and family would not want to consider comfort care  Patient would not qualify based on dementia as her functional and cognitive status is too high  · No medication changes   Emotional support provided   No indication to follow up w/ Palliative Care  Requested Prescriptions      No prescriptions requested or ordered in this encounter     There are no discontinued medications  Representatives have queried the patient's controlled substance dispensing history in the Prescription Drug Monitoring Program in compliance with regulations before I have prescribed any controlled substances  The prescription history is consistent with prescribed therapy and our practice policies  60 minutes were spent face to face with patient and family with greater than 50% of the time spent in counseling or coordination of care including discussions of symptom assessment and management, medication review, psychosocial support, chart review, imaging review, lab review, goals of care  All of the patient's questions were answered during this discussion      Return if goals of care need to be addressed  Otherwise no indication for PSC follow-up  SUBJECTIVE:  Chief Complaint   Patient presents with    Dementia    Anorexia    Gait Problem    Fatigue    Depression    Counseling    Goals      HPI    Thea Sanon is a 80 y o  female w/ dementia, 1st degree AV block, CKD4, HTN, GERD  Chart review does not show this patient following regularly w/ Geriatrics or other specialty providers  Patient is new to [facility]; she was seen by our inpatient consult service during a recent 8/13-18/20 [facility] admission for failure to thrive  Family discussions during that meeting revealed the patients daughter has strong feelings that hospice is not appropriate for the patient  Patient seen today w/ daughter Tha Pressley in the room, son Sp Cline visiting via speakerphone (given visitor restrictions d/t [address])  Patient is very hard-of-hearing but has some skill at lip-reading  She is able to answer some questions appropriately, end engage in social cues  She denies discomfort  Family reiterates their desire to forego comfort care  Tha Pressley states that the patient did not meet hospice criteria several times, latest in Spring 2020  Patient can ambulate w/ a walker, can read w/ comprehension, can read lips and participate in conversation, is fluent of speech  Family clear that they wish to care for the patient; they are not interested in SNF placement  Familys main concern is anorexia  They have tried Ritalin and Adderall in the past, for anorexia, w/o success  Patient is currently on MegAce which has provided minimal improvement in appetite  Counseled on risks of this medication  Tha Pressley states that FLORINDA ACOSTA  Almaz nagging we can get 1 can of Ensure, 1 can of pop, half a sandwich in her in a day  Albumin was 3 2 on 8/13/20; BMI is 28 4 today  Counseled at length on anorexia in dementia and in the elderly  Family wishes to continue to seek intervention for anorexia   Family is clear that patient would not want artificial nutrition - though they would be willing to give IV hydration  Family complains that the patient can get the shakes from being dehydrated  Family is also concerned about depression (mostly amotivation, poor hygiene)  We can only get her to shower once per month  She goes from bed, to the bathroom, to the couch and will lie on the couch all day, refusing to get up  They have frequently encouraged their mother to ambulate, to help improve constipation  Family states the patient can become short of breath when ambulating w/ walker, from room to room  No SOB at rest  Mild constipation noted  No N/V  Interestingly, at the start of the interview the daughter stated her mother doesnt hear or talk  But patient did respond well to direct discussion as long as she could read lips, and was able to answer some questions appropriately  Patient is retired; she has her PhD in Cognitive Psychology  She lives w/ her two adult children, who are her caregivers  She loves her two dogs - she would never leave them  PDMP shows no concerns  The following portions of the medical history were reviewed: past medical history, problem list, medication list, and social history      Current Outpatient Medications:     donepezil (ARICEPT) 10 mg tablet, , Disp: , Rfl:     ergocalciferol (VITAMIN D2) 50,000 units, , Disp: , Rfl:     febuxostat (ULORIC) 40 mg tablet, TAKE ONE TABLET BY MOUTH EVERY DAY, Disp: 90 tablet, Rfl: 0    FLUoxetine (PROzac) 40 MG capsule, Take 1 capsule (40 mg total) by mouth daily, Disp: 90 capsule, Rfl: 1    megestrol (MEGACE) 40 mg tablet, Take 1 tablet (40 mg total) by mouth 4 (four) times a day, Disp: 120 tablet, Rfl: 0    memantine (NAMENDA) 10 mg tablet, , Disp: , Rfl:     pantoprazole (PROTONIX) 40 mg tablet, Take 1 tablet (40 mg total) by mouth 2 (two) times a day, Disp: 60 tablet, Rfl: 0    linaCLOtide 145 MCG CAPS, Take 1 capsule (145 mcg total) by mouth daily (Patient not taking: Reported on 9/1/2020), Disp: 90 capsule, Rfl: 1    Review of Systems   Constitutional: Positive for activity change, appetite change and fatigue  Respiratory: Positive for shortness of breath (w/ significant exertion)  Gastrointestinal: Positive for constipation  Musculoskeletal: Positive for gait problem  Psychiatric/Behavioral: Positive for confusion, decreased concentration and dysphoric mood  All other systems reviewed and are negative  OBJECTIVE:  /66 (BP Location: Left arm, Patient Position: Sitting, Cuff Size: Standard)   Pulse 95   Temp (!) 97 1 °F (36 2 °C) (Tympanic)   Resp 18   Ht 4' 7" (1 397 m)   Wt 55 4 kg (122 lb 3 2 oz)   LMP  (LMP Unknown)   SpO2 98%   BMI 28 40 kg/m²   Vital signs reviewed  Physical Exam:  Constitutional: Pale, frail, debilitated elderly female  Appears well-nourished  In no acute physical or emotional distress  Head: Normocephalic and atraumatic  Eyes: EOM are normal  No ocular discharge  No scleral icterus  Ears: Significant b/l hearing loss noted  Neck: No visible adenopathy or masses  Respiratory: Effort normal  No stridor  No respiratory distress  Gastrointestinal: No abdominal distension  Musculoskeletal: No edema  Neurological: Alert  Able to read lips to some extend and attempts to answer questions  Poor short- and long-term memory  Follows some commands  Skin: No diaphoresis, no rashes seen on exposed areas of skin  Psychiatric: Inattentive at times  Limited insight  No agitation  Anabel Givens MD  [facility]  [phone]    Portions of this document may have been created using dictation software and as such some "sound alike" terms may have been generated by the system  Do not hesitate to contact me with any questions or clarifications

## 2020-09-02 ENCOUNTER — TELEPHONE (OUTPATIENT)
Dept: PALLIATIVE MEDICINE | Facility: CLINIC | Age: 85
End: 2020-09-02

## 2020-09-02 NOTE — TELEPHONE ENCOUNTER
Spoke with a representative on [employer] to schedule a NP consult for this Dr Jean-Paul Pool patient  The representative had additional questions that needed to be addressed with [place] her daughter in order to schedule

## 2020-09-14 ENCOUNTER — HOSPITAL ENCOUNTER (INPATIENT)
Facility: HOSPITAL | Age: 85
LOS: 3 days | Discharge: HOSPICE | DRG: 640 | End: 2020-09-17
Admitting: INTERNAL MEDICINE
Payer: COMMERCIAL

## 2020-09-14 ENCOUNTER — APPOINTMENT (EMERGENCY)
Dept: RADIOLOGY | Facility: HOSPITAL | Age: 85
DRG: 640 | End: 2020-09-14
Payer: COMMERCIAL

## 2020-09-14 DIAGNOSIS — R62.7 FAILURE TO THRIVE IN ADULT: Primary | ICD-10-CM

## 2020-09-14 DIAGNOSIS — E86.0 DEHYDRATION: ICD-10-CM

## 2020-09-14 DIAGNOSIS — I10 ESSENTIAL HYPERTENSION: ICD-10-CM

## 2020-09-14 DIAGNOSIS — R62.51 FAILURE TO THRIVE (0-17): ICD-10-CM

## 2020-09-14 LAB
ALBUMIN SERPL BCP-MCNC: 3.5 G/DL (ref 3.4–4.8)
ALP SERPL-CCNC: 50.5 U/L (ref 35–140)
ALT SERPL W P-5'-P-CCNC: 9 U/L (ref 5–54)
ANION GAP SERPL CALCULATED.3IONS-SCNC: 11 MMOL/L (ref 4–13)
AST SERPL W P-5'-P-CCNC: 22 U/L (ref 15–41)
BACTERIA UR QL AUTO: ABNORMAL /HPF
BASOPHILS # BLD AUTO: 0.01 THOUSANDS/ΜL (ref 0–0.1)
BASOPHILS NFR BLD AUTO: 0 % (ref 0–1)
BILIRUB SERPL-MCNC: 1.25 MG/DL (ref 0.3–1.2)
BILIRUB UR QL STRIP: NEGATIVE
BUN SERPL-MCNC: 19 MG/DL (ref 6–20)
CALCIUM SERPL-MCNC: 8.8 MG/DL (ref 8.4–10.2)
CHLORIDE SERPL-SCNC: 107 MMOL/L (ref 96–108)
CLARITY UR: ABNORMAL
CO2 SERPL-SCNC: 17 MMOL/L (ref 22–33)
COLOR UR: YELLOW
CREAT SERPL-MCNC: 1.4 MG/DL (ref 0.4–1.1)
EOSINOPHIL # BLD AUTO: 0 THOUSAND/ΜL (ref 0–0.61)
EOSINOPHIL NFR BLD AUTO: 0 % (ref 0–6)
ERYTHROCYTE [DISTWIDTH] IN BLOOD BY AUTOMATED COUNT: 14.9 % (ref 11.6–15.1)
GFR SERPL CREATININE-BSD FRML MDRD: 32 ML/MIN/1.73SQ M
GLUCOSE SERPL-MCNC: 102 MG/DL (ref 65–140)
GLUCOSE UR STRIP-MCNC: NEGATIVE MG/DL
HCT VFR BLD AUTO: 37.7 % (ref 34.8–46.1)
HGB BLD-MCNC: 12.5 G/DL (ref 11.5–15.4)
HGB UR QL STRIP.AUTO: ABNORMAL
IMM GRANULOCYTES # BLD AUTO: 0.05 THOUSAND/UL (ref 0–0.2)
IMM GRANULOCYTES NFR BLD AUTO: 0 % (ref 0–2)
KETONES UR STRIP-MCNC: NEGATIVE MG/DL
LEUKOCYTE ESTERASE UR QL STRIP: ABNORMAL
LYMPHOCYTES # BLD AUTO: 1.59 THOUSANDS/ΜL (ref 0.6–4.47)
LYMPHOCYTES NFR BLD AUTO: 10 % (ref 14–44)
MAGNESIUM SERPL-MCNC: 1.7 MG/DL (ref 1.6–2.6)
MCH RBC QN AUTO: 29.9 PG (ref 26.8–34.3)
MCHC RBC AUTO-ENTMCNC: 33.2 G/DL (ref 31.4–37.4)
MCV RBC AUTO: 90 FL (ref 82–98)
MONOCYTES # BLD AUTO: 1.72 THOUSAND/ΜL (ref 0.17–1.22)
MONOCYTES NFR BLD AUTO: 11 % (ref 4–12)
NEUTROPHILS # BLD AUTO: 11.97 THOUSANDS/ΜL (ref 1.85–7.62)
NEUTS SEG NFR BLD AUTO: 79 % (ref 43–75)
NITRITE UR QL STRIP: NEGATIVE
NON-SQ EPI CELLS URNS QL MICRO: ABNORMAL /HPF
PH UR STRIP.AUTO: 5.5 [PH]
PLATELET # BLD AUTO: 205 THOUSANDS/UL (ref 149–390)
PMV BLD AUTO: 10.6 FL (ref 8.9–12.7)
POTASSIUM SERPL-SCNC: 4.8 MMOL/L (ref 3.5–5)
PROT SERPL-MCNC: 7.3 G/DL (ref 6.4–8.3)
PROT UR STRIP-MCNC: ABNORMAL MG/DL
RBC # BLD AUTO: 4.18 MILLION/UL (ref 3.81–5.12)
RBC #/AREA URNS AUTO: ABNORMAL /HPF
SODIUM SERPL-SCNC: 135 MMOL/L (ref 133–145)
SP GR UR STRIP.AUTO: 1.02 (ref 1–1.03)
TROPONIN I SERPL-MCNC: <0.03 NG/ML (ref 0–0.07)
TSH SERPL DL<=0.05 MIU/L-ACNC: 1.24 UIU/ML (ref 0.34–5.6)
UROBILINOGEN UR QL STRIP.AUTO: 0.2 E.U./DL
WBC # BLD AUTO: 15.34 THOUSAND/UL (ref 4.31–10.16)
WBC #/AREA URNS AUTO: ABNORMAL /HPF

## 2020-09-14 PROCEDURE — 36415 COLL VENOUS BLD VENIPUNCTURE: CPT

## 2020-09-14 PROCEDURE — 96360 HYDRATION IV INFUSION INIT: CPT

## 2020-09-14 PROCEDURE — 80053 COMPREHEN METABOLIC PANEL: CPT

## 2020-09-14 PROCEDURE — 99285 EMERGENCY DEPT VISIT HI MDM: CPT

## 2020-09-14 PROCEDURE — 85025 COMPLETE CBC W/AUTO DIFF WBC: CPT

## 2020-09-14 PROCEDURE — 93005 ELECTROCARDIOGRAM TRACING: CPT

## 2020-09-14 PROCEDURE — 81001 URINALYSIS AUTO W/SCOPE: CPT

## 2020-09-14 PROCEDURE — 71045 X-RAY EXAM CHEST 1 VIEW: CPT

## 2020-09-14 PROCEDURE — 84443 ASSAY THYROID STIM HORMONE: CPT | Performed by: INTERNAL MEDICINE

## 2020-09-14 PROCEDURE — 84484 ASSAY OF TROPONIN QUANT: CPT

## 2020-09-14 PROCEDURE — 83735 ASSAY OF MAGNESIUM: CPT

## 2020-09-14 PROCEDURE — 99223 1ST HOSP IP/OBS HIGH 75: CPT | Performed by: INTERNAL MEDICINE

## 2020-09-14 RX ORDER — PANTOPRAZOLE SODIUM 40 MG/1
40 TABLET, DELAYED RELEASE ORAL
Status: DISCONTINUED | OUTPATIENT
Start: 2020-09-15 | End: 2020-09-17 | Stop reason: HOSPADM

## 2020-09-14 RX ORDER — ALLOPURINOL 100 MG/1
50 TABLET ORAL DAILY
Status: DISCONTINUED | OUTPATIENT
Start: 2020-09-14 | End: 2020-09-17 | Stop reason: HOSPADM

## 2020-09-14 RX ORDER — ACETAMINOPHEN 325 MG/1
650 TABLET ORAL EVERY 6 HOURS PRN
Status: DISCONTINUED | OUTPATIENT
Start: 2020-09-14 | End: 2020-09-17 | Stop reason: HOSPADM

## 2020-09-14 RX ORDER — FLUOXETINE HYDROCHLORIDE 20 MG/1
40 CAPSULE ORAL DAILY
Status: DISCONTINUED | OUTPATIENT
Start: 2020-09-14 | End: 2020-09-17 | Stop reason: HOSPADM

## 2020-09-14 RX ORDER — ONDANSETRON 2 MG/ML
4 INJECTION INTRAMUSCULAR; INTRAVENOUS EVERY 6 HOURS PRN
Status: DISCONTINUED | OUTPATIENT
Start: 2020-09-14 | End: 2020-09-17 | Stop reason: HOSPADM

## 2020-09-14 RX ORDER — AMLODIPINE BESYLATE 2.5 MG/1
2.5 TABLET ORAL DAILY
Status: DISCONTINUED | OUTPATIENT
Start: 2020-09-14 | End: 2020-09-17 | Stop reason: HOSPADM

## 2020-09-14 RX ORDER — MEGESTROL ACETATE 40 MG/1
40 TABLET ORAL 4 TIMES DAILY
Status: DISCONTINUED | OUTPATIENT
Start: 2020-09-14 | End: 2020-09-17 | Stop reason: HOSPADM

## 2020-09-14 RX ORDER — DEXTROSE AND SODIUM CHLORIDE 5; .9 G/100ML; G/100ML
75 INJECTION, SOLUTION INTRAVENOUS CONTINUOUS
Status: DISCONTINUED | OUTPATIENT
Start: 2020-09-14 | End: 2020-09-17 | Stop reason: HOSPADM

## 2020-09-14 RX ORDER — ERGOCALCIFEROL 1.25 MG/1
50000 CAPSULE ORAL WEEKLY
Status: DISCONTINUED | OUTPATIENT
Start: 2020-09-14 | End: 2020-09-17 | Stop reason: HOSPADM

## 2020-09-14 RX ORDER — ALLOPURINOL 100 MG/1
200 TABLET ORAL DAILY
Status: DISCONTINUED | OUTPATIENT
Start: 2020-09-14 | End: 2020-09-14

## 2020-09-14 RX ORDER — HEPARIN SODIUM 5000 [USP'U]/ML
5000 INJECTION, SOLUTION INTRAVENOUS; SUBCUTANEOUS EVERY 8 HOURS SCHEDULED
Status: DISCONTINUED | OUTPATIENT
Start: 2020-09-14 | End: 2020-09-17 | Stop reason: HOSPADM

## 2020-09-14 RX ADMIN — DEXTROSE AND SODIUM CHLORIDE 75 ML/HR: 5; .9 INJECTION, SOLUTION INTRAVENOUS at 15:19

## 2020-09-14 RX ADMIN — FLUOXETINE HYDROCHLORIDE 40 MG: 20 CAPSULE ORAL at 16:13

## 2020-09-14 RX ADMIN — ALLOPURINOL 50 MG: 100 TABLET ORAL at 18:12

## 2020-09-14 RX ADMIN — AMLODIPINE BESYLATE 2.5 MG: 2.5 TABLET ORAL at 16:13

## 2020-09-14 RX ADMIN — MEGESTROL ACETATE 40 MG: 40 TABLET ORAL at 21:24

## 2020-09-14 RX ADMIN — HEPARIN SODIUM 5000 UNITS: 5000 INJECTION INTRAVENOUS; SUBCUTANEOUS at 16:14

## 2020-09-14 RX ADMIN — SODIUM CHLORIDE 1000 ML: 0.9 INJECTION, SOLUTION INTRAVENOUS at 12:24

## 2020-09-14 RX ADMIN — HEPARIN SODIUM 5000 UNITS: 5000 INJECTION INTRAVENOUS; SUBCUTANEOUS at 21:24

## 2020-09-14 RX ADMIN — MEGESTROL ACETATE 40 MG: 40 TABLET ORAL at 18:12

## 2020-09-14 NOTE — ED NOTES
Patient's family taking all rings, necklaces and earrings home  Leaving patient with hearing aids, glasses, and clothing        Gregorio Poe RN  09/14/20 5484

## 2020-09-14 NOTE — PLAN OF CARE
Problem: Potential for Falls  Goal: Patient will remain free of falls  Description: INTERVENTIONS:  - Assess patient frequently for physical needs  -  Identify cognitive and physical deficits and behaviors that affect risk of falls    -  [place] fall precautions as indicated by assessment   - Educate patient/family on patient safety including physical limitations  - Instruct patient to call for assistance with activity based on assessment  - Modify environment to reduce risk of injury  - Consider OT/PT consult to assist with strengthening/mobility  Outcome: Progressing     Problem: Prexisting or High Potential for Compromised Skin Integrity  Goal: Skin integrity is maintained or improved  Description: INTERVENTIONS:  - Identify patients at risk for skin breakdown  - Assess and monitor skin integrity  - Assess and monitor nutrition and hydration status  - Monitor labs   - Assess for incontinence   - Turn and reposition patient  - Assist with mobility/ambulation  - Relieve pressure over bony prominences  - Avoid friction and shearing  - Provide appropriate hygiene as needed including keeping skin clean and dry  - Evaluate need for skin moisturizer/barrier cream  - Collaborate with interdisciplinary team   - Patient/family teaching  - Consider wound care consult   Outcome: Progressing

## 2020-09-14 NOTE — ED PROVIDER NOTES
History  Chief Complaint   Patient presents with    Shaking     per EMS, family called becky patient was shaking more than normal and was dehydrated      80year-old female history of failure to thrive dementia GERD brought by family secondary to poor p o  Intake and increasing weakness  Patient has history of similar in admission to the [facility] about a month ago for something similar  Family that time declined palliative care or hospice care  Patient apparently was placed on Megace due to poor p o  Intake and according to family she has been taking least 1 meal a day  Family is concerned patient has significant increasing weakness in the past day in the is wanted to make sure she was evaluated for dehydration which is why they brought her to the hospital   There has been no reported vomiting reported cough for congestion patient is awake and does not appear to be in any significant distress on my evaluation  Prior to Admission Medications   Prescriptions Last Dose Informant Patient Reported? Taking?    FLUoxetine (PROzac) 40 MG capsule   No Yes   Sig: Take 1 capsule (40 mg total) by mouth daily   donepezil (ARICEPT) 10 mg tablet   Yes Yes   ergocalciferol (VITAMIN D2) 50,000 units   Yes Yes   febuxostat (ULORIC) 40 mg tablet   No Yes   Sig: TAKE ONE TABLET BY MOUTH EVERY DAY   linaCLOtide 145 MCG CAPS   No Yes   Sig: Take 1 capsule (145 mcg total) by mouth daily   megestrol (MEGACE) 40 mg tablet   No Yes   Sig: Take 1 tablet (40 mg total) by mouth 4 (four) times a day   memantine (NAMENDA) 10 mg tablet   Yes Yes   pantoprazole (PROTONIX) 40 mg tablet   No Yes   Sig: Take 1 tablet (40 mg total) by mouth 2 (two) times a day      Facility-Administered Medications: None       Past Medical History:   Diagnosis Date    Arthritis     reynaulds arthritis    Cancer ([address] )     Hyperlipidemia     Hypertension     Raynaud's disease     Renal disorder     pt states she had kidney disorders long ago, but cant recall the doctor or the problem       Past Surgical History:   Procedure Laterality Date    BREAST LUMPECTOMY  01/01/1970    lumpectomy    BREAST RECONSTRUCTION  01/01/1980    Bilaterally    CHOLECYSTECTOMY      COLONOSCOPY N/A 4/3/2017    Procedure: COLONOSCOPY;  Surgeon: Nick Brice MD;  [facility]; Service:     ELBOW SURGERY Right     ESOPHAGOGASTRODUODENOSCOPY N/A 4/3/2017    Procedure: ESOPHAGOGASTRODUODENOSCOPY (EGD); Surgeon: Nick Brice MD;  [facility]; Service:     FEMUR SURGERY Right     FRACTURE SURGERY      prem to r femur    JOINT REPLACEMENT Right 01/01/2004    knee    JOINT REPLACEMENT Right     hip    OTHER SURGICAL HISTORY  09/2018    Squamous cells removed from nose    REPLACEMENT TOTAL KNEE Right        Family History   Problem Relation Age of Onset    Cancer Mother     Cancer Father     Other Father         prostate disorder     I have reviewed and agree with the history as documented  E-Cigarette/Vaping    E-Cigarette Use Never User      E-Cigarette/Vaping Substances     Social History     Tobacco Use    Smoking status: Never Smoker    Smokeless tobacco: Never Used   Substance Use Topics    Alcohol use: Yes     Comment: occationally    Drug use: No       Review of Systems   Constitutional: Positive for activity change and fatigue  Negative for chills and fever  HENT: Negative for congestion  Eyes: Negative for visual disturbance  Respiratory: Negative for shortness of breath  Cardiovascular: Negative for chest pain  Gastrointestinal: Positive for nausea  Negative for abdominal pain  Endocrine: Negative for cold intolerance  Genitourinary: Negative for frequency  Musculoskeletal: Negative for gait problem  Skin: Negative for rash  Neurological: Negative for dizziness  Psychiatric/Behavioral: Positive for behavioral problems  Negative for confusion         Physical Exam  Physical Exam  Vitals signs and nursing note reviewed  Constitutional:       General: She is not in acute distress  Appearance: She is well-developed  Comments: Patient appears just weak as far as general appearance   HENT:      Head: Normocephalic and atraumatic  Mouth/Throat:      Mouth: Mucous membranes are dry  Eyes:      Conjunctiva/sclera: Conjunctivae normal       Pupils: Pupils are equal, round, and reactive to light  Neck:      Musculoskeletal: Normal range of motion and neck supple  Cardiovascular:      Rate and Rhythm: Normal rate and regular rhythm  Heart sounds: Normal heart sounds  Pulmonary:      Effort: Pulmonary effort is normal       Breath sounds: Normal breath sounds  Abdominal:      General: Bowel sounds are normal  There is no distension  Palpations: Abdomen is soft  Tenderness: There is no abdominal tenderness  There is no guarding  Musculoskeletal: Normal range of motion  Skin:     General: Skin is warm and dry  Capillary Refill: Capillary refill takes less than 2 seconds  Neurological:      Mental Status: She is oriented to person, place, and time     Psychiatric:         Behavior: Behavior normal          Vital Signs  ED Triage Vitals [09/14/20 1130]   Temperature Pulse Respirations Blood Pressure SpO2   98 2 °F (36 8 °C) 73 19 (!) 185/89 97 %      Temp Source Heart Rate Source Patient Position - Orthostatic VS BP Location FiO2 (%)   Tympanic Monitor -- -- --      Pain Score       --           Vitals:    09/14/20 1130   BP: (!) 185/89   Pulse: 73         Visual Acuity      ED Medications  Medications   sodium chloride 0 9 % bolus 1,000 mL (1,000 mL Intravenous New Bag 9/14/20 1224)       Diagnostic Studies  Results Reviewed     Procedure Component Value Units Date/Time    Troponin I [[id number]]  (Normal) Collected:  09/14/20 1222    Lab Status:  Final result Specimen:  Blood from Arm, Left Updated:  09/14/20 1253     Troponin I <0 03 ng/mL Comprehensive metabolic panel [[id number]]  (Abnormal) Collected:  09/14/20 1222    Lab Status:  Final result Specimen:  Blood from Arm, Left Updated:  09/14/20 1249     Sodium 135 mmol/L      Potassium 4 8 mmol/L      Chloride 107 mmol/L      CO2 17 mmol/L      ANION GAP 11 mmol/L      BUN 19 mg/dL      Creatinine 1 40 mg/dL      Glucose 102 mg/dL      Calcium 8 8 mg/dL      AST 22 U/L      ALT 9 U/L      Alkaline Phosphatase 50 5 U/L      Total Protein 7 3 g/dL      Albumin 3 5 g/dL      Total Bilirubin 1 25 mg/dL      eGFR 32 ml/min/1 73sq m     Narrative:       National Kidney Disease Foundation guidelines for Chronic Kidney Disease (CKD):     Stage 1 with normal or high GFR (GFR > 90 mL/min/1 73 square meters)    Stage 2 Mild CKD (GFR = 60-89 mL/min/1 73 square meters)    Stage 3A Moderate CKD (GFR = 45-59 mL/min/1 73 square meters)    Stage 3B Moderate CKD (GFR = 30-44 mL/min/1 73 square meters)    Stage 4 Severe CKD (GFR = 15-29 mL/min/1 73 square meters)    Stage 5 End Stage CKD (GFR <15 mL/min/1 73 square meters)  Note: GFR calculation is accurate only with a steady state creatinine    Magnesium [[id number]]  (Normal) Collected:  09/14/20 1222    Lab Status:  Final result Specimen:  Blood from Arm, Left Updated:  09/14/20 1248     Magnesium 1 7 mg/dL     CBC and differential [[id number]]  (Abnormal) Collected:  09/14/20 1222    Lab Status:  Final result Specimen:  Blood from Arm, Left Updated:  09/14/20 1234     WBC 15 34 Thousand/uL      RBC 4 18 Million/uL      Hemoglobin 12 5 g/dL      Hematocrit 37 7 %      MCV 90 fL      MCH 29 9 pg      MCHC 33 2 g/dL      RDW 14 9 %      MPV 10 6 fL      Platelets 261 Thousands/uL      Neutrophils Relative 79 %      Immat GRANS % 0 %      Lymphocytes Relative 10 %      Monocytes Relative 11 %      Eosinophils Relative 0 %      Basophils Relative 0 %      Neutrophils Absolute 11 97 Thousands/µL      Immature Grans Absolute 0 05 Thousand/uL      Lymphocytes Absolute 1 59 Thousands/µL      Monocytes Absolute 1 72 Thousand/µL      Eosinophils Absolute 0 00 Thousand/µL      Basophils Absolute 0 01 Thousands/µL     UA w Reflex to Microscopic w Reflex to Culture [[id number]]  (Abnormal) Collected:  09/14/20 1221    Lab Status:  Final result Specimen:  Urine, Straight Cath Updated:  09/14/20 1233     Color, UA Yellow     Clarity, UA Slightly Cloudy     Specific Gravity, UA 1 025     pH, UA 5 5     Leukocytes, UA Trace     Nitrite, UA Negative     Protein, UA 1+ mg/dl      Glucose, UA Negative mg/dl      Ketones, UA Negative mg/dl      Urobilinogen, UA 0 2 E U /dl      Bilirubin, UA Negative     Blood, UA 2+    Urine Microscopic [[id number]] Collected:  09/14/20 1221    Lab Status: In process Specimen:  Urine, Straight Cath Updated:  09/14/20 1233                 XR chest 1 view portable   ED Interpretation by Yvonne Zhong MD (09/14 1303)   Chest x-ray demonstrates normal cardiac silhouette no infiltrate no effusion no acute findings                 Procedures  Procedures         ED Course                                       MDM  Number of Diagnoses or Management Options  Diagnosis management comments: Workup demonstrates urine demonstrates protein no signs of infection patient had CBC which is essentially unremarkable chemistry does demonstrate patient has renal insufficiency creatinine 1 4 chest x-ray demonstrates no acute findings EKG demonstrates sinus rhythm with significant QT wave prolongation  Patient has had significant poor p o   Intake plan will be to admit the patient to the hospital for IV hydration cardiac monitoring secondary to prolonged QT interval   Patient will need further evaluation for etiology of her prolonged QT interval       Disposition  Final diagnoses:   None     ED Disposition     ED Disposition Condition Date/Time Comment    Admit Stable Mon Sep 14, 2020  1:14 PM Case was discussed with Hospitalist and the patient's admission status was agreed to be Admission Status: inpatient status to the service of Dr Catrina Jacome  Follow-up Information    None         Patient's Medications   Discharge Prescriptions    No medications on file     No discharge procedures on file      PDMP Review       Value Time User    PDMP Reviewed  Yes 8/18/2020  2:52 PM Gayle Thurman MD          ED Provider  Electronically Signed by           Arcadio Ojeda MD  09/14/20 8689

## 2020-09-14 NOTE — H&P
History & Physical - [facility]      PATIENT INFORMATION      Aisha Xiong 80 y o  female MRN: [id number]  Unit/Bed#: -01 Encounter: [id number]  Admitting Physician: Geetha Gastelum MD  PCP: En Garcia MD  Date of Admission:  09/14/20      ASSESSMENTS & PLAN       1  Generalized weakness      Ambulatory dysfunction      Poor oral intake      Dehydration  · Admit for observation  · Start D5 NS at 76 cc/hour  · Nutrition consult  · Continue soft diet-pending speech evaluation  · Maintain aspiration precautions  · Continue megase  · PT/OT Eval and TTT      2  Hypertensive urgency with systolic blood pressure 226W  · Start amlodipine 2 5 mg daily    3  Protein calorie malnutrition  · Add Ensure Plus t i d   · Nutrition input appreciated    4  Gout  · No evidence of acute flare-continue Feboxistat    5  Dementia  · Hold donepezil, memantine for now    6  CKD-stable-creatinine at the baseline  VTE Pharmacologic Prophylaxis: Heparin   VTE Mechanical Prophylaxis: SCD's      CHIEF COMPLAINT      Generalized weakness-failure to thrive      HISTORY OF PRESENT ILLNESS      Aisha Xiong is a 80 y o  female with past medical history of hypertension, gout, protein calorie malnutrition who presents with chief complaint of generalized weakness  Patient was brought to the hospital by her daughter who reported that patient not drinking enough water recently, feeling very weak compared to her baseline  Patient usually ambulate using a walker infrequently  Patient was recently admitted to [facility] for the same  Spent 5 days in the hospital   Was discharged on [facility] for poor appetite  Patient has chronic dysphagia over the upper endoscopy refused by the family given her age       Denies any nausea, vomiting, abdominal pain, diarrhea, fever, chills, chest pain, palpitation, shortness of breath, blurry vision, focal tingling numbness       Upon presentation to the hospital patient vital sign was significant for blood pressure of 188/69  Apparently patient did not have her morning medication  BMP significant for creatinine 1 4 at baseline is same  Normal liver enzyme  Negative troponin  Normal lactic acid  Patient EKG shows first-degree AV block, left anterior fascicular block  Patient was given 1 L of NS in ER  REVIEW OF SYSTEMS      ROS: A complete 12 point ROS is negative other than that noted in the HPI      PAST MEDICAL & SURGICAL HISTORY      Past Medical History:   Diagnosis Date    Arthritis     reynaulds arthritis    Cancer ([address] )     Hyperlipidemia     Hypertension     Raynaud's disease     Renal disorder     pt states she had kidney disorders long ago, but cant recall the doctor or the problem       Past Surgical History:   Procedure Laterality Date    BREAST LUMPECTOMY  01/01/1970    lumpectomy    BREAST RECONSTRUCTION  01/01/1980    Bilaterally    CHOLECYSTECTOMY      COLONOSCOPY N/A 4/3/2017    Procedure: COLONOSCOPY;  Surgeon: Viji Rhodes MD;  [facility]; Service:     ELBOW SURGERY Right     ESOPHAGOGASTRODUODENOSCOPY N/A 4/3/2017    Procedure: ESOPHAGOGASTRODUODENOSCOPY (EGD); Surgeon: Viji Rhodes MD;  [facility]; Service:     FEMUR SURGERY Right     FRACTURE SURGERY      prem to r femur    JOINT REPLACEMENT Right 01/01/2004    knee    JOINT REPLACEMENT Right     hip    OTHER SURGICAL HISTORY  09/2018    Squamous cells removed from nose    REPLACEMENT TOTAL KNEE Right          MEDICATIONS & ALLERGIES     Prior to Admission medications    Medication Sig Start Date End Date Taking?  Authorizing Provider   donepezil (ARICEPT) 10 mg tablet  4/16/20  Yes Historical Provider, MD   ergocalciferol (VITAMIN D2) 50,000 units  7/6/20  Yes Historical Provider, MD   febuxostat (ULORIC) 40 mg tablet TAKE ONE TABLET BY MOUTH EVERY DAY 8/13/20  Yes Ej Nam MD   FLUoxetine (PROzac) 40 MG capsule Take 1 capsule (40 mg total) by mouth daily 7/6/20  Yes Xochitl Lee MD   linaCLOtide 145 MCG CAPS Take 1 capsule (145 mcg total) by mouth daily 7/13/20  Yes Xochitl Lee MD   megestrol (MEGACE) 40 mg tablet Take 1 tablet (40 mg total) by mouth 4 (four) times a day 8/18/20 9/17/20 Yes Luisito Galvan MD   memantine [facility]) 10 mg tablet  3/13/20  Yes Ok Mak MD   pantoprazole (PROTONIX) 40 mg tablet Take 1 tablet (40 mg total) by mouth 2 (two) times a day 8/18/20 9/17/20 Yes Luisito Galvan MD         Allergies: Allergies   Allergen Reactions    Aspirin      Possible? Unsure of rx            SOCIAL HISTORY      Substance Use History:   Social History     Substance and Sexual Activity   Alcohol Use Yes    Comment: occationally     Social History     Tobacco Use   Smoking Status Never Smoker   Smokeless Tobacco Never Used     Social History     Substance and Sexual Activity   Drug Use No         FAMILY HISTORY      Family History   Problem Relation Age of Onset    Cancer Mother     Cancer Father     Other Father         prostate disorder       PHYSICAL EXAM      Vitals:   Blood Pressure: (!) 188/69 (09/14/20 1320)  Pulse: 62 (09/14/20 1320)  Temperature: 98 2 °F (36 8 °C) (09/14/20 1130)  Temp Source: Tympanic (09/14/20 1130)  Respirations: 18 (09/14/20 1320)  Weight - Scale: 55 4 kg (122 lb 2 2 oz) (09/14/20 1128)  SpO2: 97 % (09/14/20 1320)      HEENT:  Elderly female lying in a bed-looks frail  CARDIAC:  Irregular,, +S1/S2, no m/g/r, no S3/S4 heard  PULMONARY:  CTA B/L, no wheezing/rales/rhonci, non-labored breathing  ABDOMEN:  Soft, NT/ND, +BS, no rebound/guarding/rigidity  Extremities:  2+ Pulses in DP/PT  No edema, cyanosis, or clubbing  NEUROLOGIC:  Alert/oriented x3   No motor or sensory deficits  SKIN:  Dry skin  ADDITIONAL DATA     Lab Results:     Results from last 7 days   Lab Units 09/14/20  1222   WBC Thousand/uL 15 34*   HEMOGLOBIN g/dL 12 5   HEMATOCRIT % 37 7   PLATELETS Thousands/uL 205   NEUTROS PCT % 79*   LYMPHS PCT % 10*   MONOS PCT % 11   EOS PCT % 0     Results from last 7 days   Lab Units 09/14/20  1222   POTASSIUM mmol/L 4 8   CHLORIDE mmol/L 107   CO2 mmol/L 17*   BUN mg/dL 19   CREATININE mg/dL 1 40*   CALCIUM mg/dL 8 8   ALK PHOS U/L 50 5   ALT U/L 9   AST U/L 22           Imaging:     Xr Chest 1 View Portable    Result Date: 9/14/2020  Narrative: CHEST INDICATION:   weakness  COMPARISON:  8/13/2020 EXAM PERFORMED/VIEWS:  XR CHEST PORTABLE FINDINGS:  Hypoventilation is present  The heart is borderline in size on this AP study  Surgical clips are seen in the left axilla  The lungs are clear  No pneumothorax or pleural effusion  Healed rib fractures are noted on the left  Impression: Hypoventilation with borderline cardiomegaly  No focal infiltrate Workstation performed: [id number]       EKG, Pathology, and Other Studies Reviewed on Admission:   · EKG: Reviewed      Code Status: Level 1 - Full Code  Admission Status: SANAM Puentes  Internal Medicine PGY-3  9/14/2020 3:09 PM      Total Time for Visit, including Counseling / Coordination of Care: 1 hour total time spent admitting patient  Greater than 50% of this total time spent on direct patient counseling and coordination of care     ** Please Note: This note is constructed using a voice recognition dictation system   **

## 2020-09-15 DIAGNOSIS — Z71.89 COMPLEX CARE COORDINATION: Primary | ICD-10-CM

## 2020-09-15 PROBLEM — R62.51 FAILURE TO THRIVE (0-17): Status: ACTIVE | Noted: 2020-09-15

## 2020-09-15 LAB
ANION GAP SERPL CALCULATED.3IONS-SCNC: 10 MMOL/L (ref 4–13)
BASOPHILS # BLD AUTO: 0.02 THOUSANDS/ΜL (ref 0–0.1)
BASOPHILS NFR BLD AUTO: 0 % (ref 0–1)
BUN SERPL-MCNC: 16 MG/DL (ref 6–20)
CALCIUM SERPL-MCNC: 7.7 MG/DL (ref 8.4–10.2)
CHLORIDE SERPL-SCNC: 112 MMOL/L (ref 96–108)
CO2 SERPL-SCNC: 17 MMOL/L (ref 22–33)
CREAT SERPL-MCNC: 1.14 MG/DL (ref 0.4–1.1)
EOSINOPHIL # BLD AUTO: 0.01 THOUSAND/ΜL (ref 0–0.61)
EOSINOPHIL NFR BLD AUTO: 0 % (ref 0–6)
ERYTHROCYTE [DISTWIDTH] IN BLOOD BY AUTOMATED COUNT: 15.2 % (ref 11.6–15.1)
GFR SERPL CREATININE-BSD FRML MDRD: 41 ML/MIN/1.73SQ M
GLUCOSE SERPL-MCNC: 127 MG/DL (ref 65–140)
HCT VFR BLD AUTO: 35.8 % (ref 34.8–46.1)
HGB BLD-MCNC: 11.6 G/DL (ref 11.5–15.4)
IMM GRANULOCYTES # BLD AUTO: 0.02 THOUSAND/UL (ref 0–0.2)
IMM GRANULOCYTES NFR BLD AUTO: 0 % (ref 0–2)
LYMPHOCYTES # BLD AUTO: 1.51 THOUSANDS/ΜL (ref 0.6–4.47)
LYMPHOCYTES NFR BLD AUTO: 12 % (ref 14–44)
MCH RBC QN AUTO: 29.4 PG (ref 26.8–34.3)
MCHC RBC AUTO-ENTMCNC: 32.4 G/DL (ref 31.4–37.4)
MCV RBC AUTO: 91 FL (ref 82–98)
MONOCYTES # BLD AUTO: 1.34 THOUSAND/ΜL (ref 0.17–1.22)
MONOCYTES NFR BLD AUTO: 11 % (ref 4–12)
NEUTROPHILS # BLD AUTO: 9.67 THOUSANDS/ΜL (ref 1.85–7.62)
NEUTS SEG NFR BLD AUTO: 77 % (ref 43–75)
PLATELET # BLD AUTO: 202 THOUSANDS/UL (ref 149–390)
PMV BLD AUTO: 12 FL (ref 8.9–12.7)
POTASSIUM SERPL-SCNC: 4.1 MMOL/L (ref 3.5–5)
RBC # BLD AUTO: 3.95 MILLION/UL (ref 3.81–5.12)
SODIUM SERPL-SCNC: 139 MMOL/L (ref 133–145)
WBC # BLD AUTO: 12.57 THOUSAND/UL (ref 4.31–10.16)

## 2020-09-15 PROCEDURE — 85025 COMPLETE CBC W/AUTO DIFF WBC: CPT | Performed by: INTERNAL MEDICINE

## 2020-09-15 PROCEDURE — 97116 GAIT TRAINING THERAPY: CPT

## 2020-09-15 PROCEDURE — 99233 SBSQ HOSP IP/OBS HIGH 50: CPT | Performed by: INTERNAL MEDICINE

## 2020-09-15 PROCEDURE — 97163 PT EVAL HIGH COMPLEX 45 MIN: CPT

## 2020-09-15 PROCEDURE — 92610 EVALUATE SWALLOWING FUNCTION: CPT

## 2020-09-15 PROCEDURE — 80048 BASIC METABOLIC PNL TOTAL CA: CPT | Performed by: INTERNAL MEDICINE

## 2020-09-15 RX ORDER — MINERAL OIL AND PETROLATUM 150; 830 MG/G; MG/G
OINTMENT OPHTHALMIC
Status: DISCONTINUED | OUTPATIENT
Start: 2020-09-15 | End: 2020-09-17 | Stop reason: HOSPADM

## 2020-09-15 RX ORDER — SODIUM BICARBONATE 650 MG/1
650 TABLET ORAL
Status: DISCONTINUED | OUTPATIENT
Start: 2020-09-15 | End: 2020-09-17 | Stop reason: HOSPADM

## 2020-09-15 RX ADMIN — FLUOXETINE HYDROCHLORIDE 40 MG: 20 CAPSULE ORAL at 10:20

## 2020-09-15 RX ADMIN — MEGESTROL ACETATE 40 MG: 40 TABLET ORAL at 08:46

## 2020-09-15 RX ADMIN — HEPARIN SODIUM 5000 UNITS: 5000 INJECTION INTRAVENOUS; SUBCUTANEOUS at 14:35

## 2020-09-15 RX ADMIN — DEXTROSE AND SODIUM CHLORIDE 75 ML/HR: 5; .9 INJECTION, SOLUTION INTRAVENOUS at 05:19

## 2020-09-15 RX ADMIN — WHITE PETROLATUM 57.7 %-MINERAL OIL 31.9 % EYE OINTMENT: at 21:34

## 2020-09-15 RX ADMIN — HEPARIN SODIUM 5000 UNITS: 5000 INJECTION INTRAVENOUS; SUBCUTANEOUS at 21:34

## 2020-09-15 RX ADMIN — SODIUM BICARBONATE 650 MG TABLET 650 MG: at 18:00

## 2020-09-15 RX ADMIN — PANTOPRAZOLE SODIUM 40 MG: 40 TABLET, DELAYED RELEASE ORAL at 08:46

## 2020-09-15 RX ADMIN — ALLOPURINOL 50 MG: 100 TABLET ORAL at 08:46

## 2020-09-15 RX ADMIN — AMLODIPINE BESYLATE 2.5 MG: 2.5 TABLET ORAL at 08:46

## 2020-09-15 RX ADMIN — MEGESTROL ACETATE 40 MG: 40 TABLET ORAL at 12:59

## 2020-09-15 RX ADMIN — ACETAMINOPHEN 650 MG: 325 TABLET, FILM COATED ORAL at 14:47

## 2020-09-15 RX ADMIN — HEPARIN SODIUM 5000 UNITS: 5000 INJECTION INTRAVENOUS; SUBCUTANEOUS at 06:26

## 2020-09-15 RX ADMIN — DEXTROSE AND SODIUM CHLORIDE 75 ML/HR: 5; .9 INJECTION, SOLUTION INTRAVENOUS at 18:41

## 2020-09-15 RX ADMIN — MEGESTROL ACETATE 40 MG: 40 TABLET ORAL at 18:00

## 2020-09-15 NOTE — SPEECH THERAPY NOTE
Speech Language/Pathology  Speech-Language Pathology Bedside Swallow Evaluation        Patient Name: Joo Galloway    [id number] Date: 9/15/2020     Problem List  Principal Problem:    Failure to thrive (0-17)  Active Problems:    Dementia without behavioral disturbance ([facility] [address] )         Past Medical History  Past Medical History:   Diagnosis Date    Arthritis     reynaulds arthritis    Cancer ([facility] [address] )     Hyperlipidemia     Hypertension     Raynaud's disease     Renal disorder     pt states she had kidney disorders long ago, but cant recall the doctor or the problem       Past Surgical History  Past Surgical History:   Procedure Laterality Date    BREAST LUMPECTOMY  01/01/1970    lumpectomy    BREAST RECONSTRUCTION  01/01/1980    Bilaterally    CHOLECYSTECTOMY      COLONOSCOPY N/A 4/3/2017    Procedure: COLONOSCOPY;  Surgeon: Bee Rhodes MD;  [facility]: [facility]; Service:     ELBOW SURGERY Right     ESOPHAGOGASTRODUODENOSCOPY N/A 4/3/2017    Procedure: ESOPHAGOGASTRODUODENOSCOPY (EGD); Surgeon: Bee Rhodes MD;  [facility]; Service:     FEMUR SURGERY Right     FRACTURE SURGERY      prem to r femur    JOINT REPLACEMENT Right 01/01/2004    knee    JOINT REPLACEMENT Right     hip    OTHER SURGICAL HISTORY  09/2018    Squamous cells removed from nose    REPLACEMENT TOTAL KNEE Right        Summary    Pt presents with swallowing skills grossly WFL, however pt  Is resistive to intake overall on materials presented during this assessment       Recommendations:   Diet: soft/level 3 diet and thin liquids   Meds: whole with liquid, whole with puree and crushed with puree   Frequent Oral care: 4x/day  Aspiration precautions and compensatory swallowing strategies: upright posture, only feed when fully alert and FINGER FOODS  Other Recommendations/ considerations: if pt is resistant to utensils, can place finger foods in hand and she will eat them; may need alternate source of nutrition if intake continues to decline  Current Medical Status  Pt is a 80 y o  female who presented to [facility] with history of failure to thrive dementia GERD brought by family secondary to poor p o  Intake and increasing weakness  Patient has history of similar in admission to the [facility] about a month ago for something similar  Family that time declined palliative care or hospice care  Patient apparently was placed on Megace due to poor p o  Intake and according to family she has been taking least 1 meal a day  Family is concerned patient has significant increasing weakness in the past day in the is wanted to make sure she was evaluated for dehydration which is why they brought her to the hospital   There has been no reported vomiting reported cough for congestion patient is awake and does not appear to be in any significant distress on my evaluation       Past medical history:   Please see H&P for details    Special Studies:  Chest Xray 9/14/20: Hypoventilation with borderline cardiomegaly    No focal infiltrate       Social/Education/Vocational Hx:  Pt lives with family    Swallow Information   Current Risks for Dysphagia & Aspiration: decreased alertness  Current Symptoms/Concerns: does not participate in meals fully  Current Diet: soft/level 3 diet and thin liquids   Baseline Diet: regular diet and thin liquids    Baseline Assessment   Behavior/Cognition: low alertness level  Speech/Language Status: no verbal output noted  Patient Positioning: upright in recliner     Swallow Mechanism Exam   Facial: symmetrical  Labial: unable to test 2/2 limited command following  Lingual: unable to test 2/2 limited command following  Velum: unable to visualize  Mandible: unable to test 2/2 limited command following  Dentition: could only visualize mandible  Insicors were present  Unable to assess/visualize rest of oral cavity    Vocal quality:non-verbal   Volitional Cough: unable to initiate volitional cough   Respiratory: room air    Consistencies Assessed and Performance   Consistencies Administered: soft solids and hard solids- Pt  Was sitting OOB in a recliner chair, however her eyes were closed and her head was tilted into her arm  When attempt to reposition her she yelled and put her head back in her arm  Lunch tray was in front of her untouched  It consisted of several juices, pasta, chopped carrots, applesauce, and a  fresh fruit cup  Oral Stage: impaired secondary to orientation, not dysphagia  Pt was trialed with 1/2 tsp of applesauce, she would not open her eyes  When spoon touched her lip, she started swatting her arms and hands and covered her mouth with her hand  Attempted x 3 and she repeated the motion  Some applesauce got on her finger and she licked it off  Then attempted to give her a saltine cracker and she swatted in the same manner  When a piece of the cracker was put in her fingers, she held it appropriately and put it in her mouth  When placing appropriate foods in her hand, she was able to fed herself  Noted lengthy mastication, however it was complete and did not note residue from the anterior portion of her oral cavity (all that she would let me visualize)  In this manner, she fed herself- 2 saltines, 5 grapes, 1 piece of pineapple, 1 piece of honeydew, and 4 pieces of cantaloupe  Retried offering liquids and spoon of puree and she again resisted and covered her mouth  Pharyngeal Stage: grossly WFL  For the items that she was able to self feed herself, she did not have any overt s/s of aspiration  She would not allow neck palpitation for swallow but was able to visualize hyolaryngeal rise and audible swallow was noted         Esophageal Concerns: none reported      Results Reviewed with: RN and MD   Dysphagia Goals: none at this time  Discharge recommendation: likely no follow up needed for swallowing    Speech Therapy Prognosis   Prognosis: poor    Prognosis Considerations: age, medical status and prior medical history

## 2020-09-15 NOTE — UTILIZATION REVIEW
Notification of Inpatient Admission/Inpatient Authorization Request   This is a Notification of Inpatient Admission for [address]  Be advised that this patient was admitted to our facility under Inpatient Status  Contact Glo Alonso at [phone] for additional admission information  [address] [facility]  DEDICATED [phone]  Patient Name:   Kylie Mai   YOB: 1924       [address]   [address]:   [facility]  Tax ID: [id number]  NPI: [id number] Attending Provider/NPI:  Phone:  Address: Sylvia Kumar [[id number]]  [phone]  Same as the facility   Place of Service Code: 24 [facility]   Start Date: 9/14/20 1316 Discharge Date & Time: No discharge date for patient encounter  Type of Admission: Inpatient Status Discharge Disposition   (if discharged): Home/Self Care   Patient Diagnoses: No admission diagnoses are documented for this encounter  Orders: Admission Orders (From admission, onward)     Ordered        09/14/20 1316  Inpatient Admission (expected length of stay for this patient Order details is greater than two midnights)  Once                    Assigned Utilization Review Contact: Glo Alonso  Utilization   Network Utilization Review Department  Phone: [phone]; Fax [phone]  Email: [email or web address]   ATTENTION PAYERS: Please call the assigned Utilization  directly with any questions or concerns ALL voicemails in the department are confidential  Send all requests for admission clinical reviews, approved or denied determinations and any other requests to dedicated fax number belonging to the campus where the patient is receiving treatment

## 2020-09-15 NOTE — PROGRESS NOTES
[facility] - Internal Medicine Progress Note      PATIENT INFORMATION      Patient: Mayi Sanders 80 y o  female   MRN: [id number]  PCP: Nirmala Gandhi MD  Unit/Bed#: [facility] Encounter: [id number]  Date Of Visit: 09/15/20  Current Length of Stay: 1 day(s)     ASSESSMENTS & PLAN      1  Generalized weakness  2  Ambulatory dysfunction - Poor oral intake - Dehydration  Need more hydration-continue D5 NS at 75 cc/hour  Nutrition on board  Mari Prows Speech evaluate patient-continue mechanical soft diet  Maintain aspiration precautions  Continue megase  PT/OT      3  Protein calorie malnutrition  · Ensure Plus t i d   · Nutrition input appreciated    4  Gout  · continue allopurinol-renally dosed    5  Dementia  · Stable    6  CKD--chronic met metabolic acidosis-start bicarb tablets 600 b i d  stable-creatinine at the baseline  VTE Pharmacologic Prophylaxis: Heparin   VTE Mechanical Prophylaxis: SCD's    Discussed with the family-no plan for rehab placement-refused by the same  SUBJECTIVE     No acute event during the night  Still poor oral intake  Fluid continued overnight  OBJECTIVE     Vitals:   Temp (24hrs), Av 1 °F (37 3 °C), Min:98 5 °F (36 9 °C), Max:100 °F (37 8 °C)    Temp:  [98 5 °F (36 9 °C)-100 °F (37 8 °C)] 100 °F (37 8 °C)  HR:  [60-80] 80  Resp:  [20-28] 28  BP: (139-174)/() 139/73  SpO2:  [95 %-98 %] 95 %  Body mass index is 24 67 kg/m²  Input and Output Summary (last 24 hours): Intake/Output Summary (Last 24 hours) at 9/15/2020 1711  Last data filed at 9/15/2020 1001  Gross per 24 hour   Intake 1267 5 ml   Output --   Net 1267 5 ml       Physical Exam:   GENERAL:  Elderly female lying on the bed-demented-frail    HEENT:  NC/AT, PERRL, EOMI, MMM, no scleral icterus  CARDIAC:  RRR, +S1/S2, no S3/S4 heard, no m/g/r  PULMONARY:  Decreased air movement bilaterally basis-pacemaker in place  ABDOMEN:  Soft, NT/ND, +BS, no rebound/guarding/rigidity  Extremities:  2+ Pulses in DP/PT  No edema, cyanosis, or clubbing  NEUROLOGIC:  Alert/oriented x0 at baseline  No motor or sensory deficits  SKIN:  No rashes or erythema          ADDITIONAL DATA     Labs & Recent Cultures:     Results from last 7 days   Lab Units 09/15/20  1527   WBC Thousand/uL 12 57*   HEMOGLOBIN g/dL 11 6   HEMATOCRIT % 35 8   PLATELETS Thousands/uL 202   NEUTROS PCT % 77*   LYMPHS PCT % 12*   MONOS PCT % 11   EOS PCT % 0     Results from last 7 days   Lab Units 09/15/20  1527 09/14/20  1222   POTASSIUM mmol/L 4 1 4 8   CHLORIDE mmol/L 112* 107   CO2 mmol/L 17* 17*   BUN mg/dL 16 19   CREATININE mg/dL 1 14* 1 40*   CALCIUM mg/dL 7 7* 8 8   ALK PHOS U/L  --  50 5   ALT U/L  --  9   AST U/L  --  22                     Nutrition:  Diet Dysphagia/Modified Consistency; Dysphagia 3-Dental Soft; Thin Liquid; No Milk, No Caffeine  Radiology Results:   XR chest 1 view portable   ED Interpretation by Gino Bullard MD (09/14 1303)   Chest x-ray demonstrates normal cardiac silhouette no infiltrate no effusion no acute findings      Final Result by Dennise Serrano MD (09/14 1331)      Hypoventilation with borderline cardiomegaly        No focal infiltrate            Workstation performed: [id number]           Scheduled Medications:  allopurinol, 50 mg, Daily  amLODIPine, 2 5 mg, Daily  ergocalciferol, 50,000 Units, Weekly  FLUoxetine, 40 mg, Daily  heparin (porcine), 5,000 Units, Q8H RENZO  linaCLOtide, 1 capsule, Daily  megestrol, 40 mg, 4x Daily  pantoprazole, 40 mg, Early Morning        PRN MEDS:  acetaminophen, 650 mg, Q6H PRN  ondansetron, 4 mg, Q6H PRN        Last 24 Hours Medication List:   Current Facility-Administered Medications   Medication Dose Route Frequency Provider Last Rate    acetaminophen  650 mg Oral Q6H PRN Adela Cervantes MD      allopurinol  50 mg Oral Daily Vargas Goode MD      amLODIPine  2 5 mg Oral Daily Vargas Goode MD      dextrose 5 % and sodium chloride 0 9 %  75 mL/hr Intravenous Continuous Jessy Taylor MD 75 mL/hr (09/15/20 0519)    ergocalciferol  50,000 Units Oral Weekly Vargas Goode MD      FLUoxetine  40 mg Oral Daily Vargas Goode MD      heparin (porcine)  5,000 Units Subcutaneous Q8H [address] Vargas Goode MD      linaCLOtide  1 capsule Oral Daily Vargas Goode MD      megestrol  40 mg Oral 4x Daily Vargas Goode MD      ondansetron  4 mg Intravenous Q6H PRN Jessy Taylor MD      pantoprazole  40 mg Oral Early Morning Jessy Taylor MD            Time Spent for Care: 30 mins spent in total   More than 50% of total time spent on counseling and coordination of care as described above  Current Length of Stay: 1 day(s)      Code Status: Level 1 - Full Code          ** Please Note: This note is constructed using a voice recognition dictation system   **

## 2020-09-15 NOTE — CASE MANAGEMENT
LOS: 1 GMLOS: 2    Patient is a 30 day readmission with a score of 18  Patient is not a bundle  Patient presented to [facility] from her residence with weakness and dehydration  Pt was seen at the [address] [place] [address] from 8/13-8/18 for failure to thrive and similar complaints  At that time, family was not interested in comfort measures and chose to take patient home and follow-up with GI in one month  Patient was admitted to [facility] for further evaluation  Sw informed by attending that patient has been medically approved for discharge today  PT evaluated the patient and is recommending discharge home w/ [facility]  Patient has good family support system consisting of daughter Kvng Goldsmith  [phone] and friend Freddie Corbett   Due to patient [facility], SW called the daughter to know the history of patients mobility  Daughter stated " my mother needs assistance with her ADLs  Myself and my brother help her with almost everything  She has hearing problems and we usually write things in paper to address our concerns " SW discussed options for home assistance  Dtr noted pt is not financially eligible for Medicaid; therefore, she would not qualify for the waiver program  SW advised family would have to privately pay for non-skilled [address]  Recommended for skilled [address] discussed as well as Freedom of choice  Dtr noted no preference for [address] agency and was agreeable to referral sent to [facility]  Referral sent via [employer] for SN/PT/OT/MSW/HHA  Dtr noted she will transport patient home after she gets out of work this evening  Dtr would like to schedule follow-up appointments with PCP and GI as they need to coordinate with her schedule  Referral made for O/P case management

## 2020-09-15 NOTE — PLAN OF CARE
Problem: PHYSICAL THERAPY ADULT  Goal: Performs mobility at highest level of function for planned discharge setting  See evaluation for individualized goals  Description: Treatment/Interventions: Functional transfer training, LE strengthening/ROM, Elevations, Endurance training, Therapeutic exercise, Cognitive reorientation, Patient/family training, Bed mobility, Gait training, Spoke to nursing, Spoke to case management, OT  Equipment Recommended: Meseret Roque, Wheelchair(recommend transport WC for community needs)       See flowsheet documentation for full assessment, interventions and recommendations  Outcome: Progressing  Note: Prognosis: Fair  Problem List: Decreased strength, Decreased endurance, Impaired balance, Decreased mobility, Decreased cognition, Decreased coordination, Impaired judgement, Decreased safety awareness, Impaired hearing  Assessment: Pt seen for PT evaluation, after clearance by RN Marty Senior, with activity orders of "up with assist"  Pt admitted on 9/14/2020 from home with weakness and increased shakiness, and dx of Failure to thrive (0-17)  Comorbidities affecting pt's functional performance include: arthritis, dementia, GERD, hypertension, hyperlipidemia, DEREK and TKA, renal disorder, breast CA, failure to thrive  Personal factors affecting patient at time of initial evaluation include: ambulating with assistive device, stairs to enter home, communication issues, inability to ambulate household distances, inability to navigate level surfaces without external assistance, inability to perform dynamic tasks in community, decreased cognition, hearing impairments, limited insight into impairments, inability to perform ADLS, inability to perform IADLS  and inability to live alone   Prior to admission, patient was requiring assist for functional mobility with use of RW, requiring assist for ADLS, requiring assist for IADLS, living with family in a [address] in a 2 level home with 4 steps to enter, ambulating household distance and home with family assist  The Barthel Index was used as a functional outcome tool presenting with a score of 40 indicating severe limitations of functional mobility and ADLS  Pt is at risk of falls due to impaired balance, impaired cognition, impaired insight/safety awareness, acuity of medical illness, use of assistive device, advanced age and hearing impairment, baseline level of assist for mobility  Pt's clinical presentation is currently evolving and unstable/unpredictable as seen in patient's presentation of varying levels of cognitive performance, increased fall risk, new onset of impairment of functional mobility, decreased endurance and new onset of weakness  This PT evaluation requires clinical decision making of high complexity, based on multiple medical/physiological/functional factors which affect pt's performance with evaluation and therapist judgement for future treatment sessions  Pt would benefit from continued PT treatment in order to address above impairments, decrease risk of falls, maximize independence with functional mobility, and ensure safety with mobility for transition to next level of care  Based on pt presentation and impairments, pt would most appropriately benefit from return to home health PT services and 24 hr care/supervision from family upon d/c    Barriers to Discharge: Inaccessible home environment(home has [address])     PT Discharge Recommendation: Home with skilled therapy(24 hour care/supervision from family, + HHPT )          See flowsheet documentation for full assessment

## 2020-09-15 NOTE — UTILIZATION REVIEW
Initial Clinical Review    Admission: Date/Time/Statement:   Admission Orders (From admission, onward)     Ordered        09/14/20 1316  Inpatient Admission (expected length of stay for this patient Order details is greater than two midnights)  Once                Orders Placed This Encounter   Procedures    Inpatient Admission (expected length of stay for this patient Order details is greater than two midnights)     Standing Status:   Standing     Number of Occurrences:   1     Order Specific Question:   Admitting Physician     Answer:   Mary Vidal [[id number]]     Order Specific Question:   Level of Care     Answer:   Med Surg [[id number]]     Order Specific Question:   Estimated length of stay     Answer:   More than 2 Midnights     Order Specific Question:   Certification     Answer:   I certify that inpatient services are medically necessary for this patient for a duration of greater than two midnights  See H&P and MD Progress Notes for additional information about the patient's course of treatment  ED Arrival Information     Expected Arrival Acuity Means of Arrival Escorted By Service Admission Type    - 9/14/2020 11:24 [facility]gent [facility] Hospitalist Urgent    Arrival Complaint    Shaking - dehydrated per family     Chief Complaint   Patient presents with    Shaking     per EMS, family called becky patient was shaking more than normal and was dehydrated      Assessment/Plan: 80year old female with PMHx HTN, HLD, Gout, Raynaud's, arthritis, chronic dysphagia - family refuses endoscopy, protein calorie malnutrition, dementia  Presented via EMS to [facility] ED on 9/14/20 2nd increasing generalized weakness and shaking with poor oral intake - family reports "she is dehydrated again"  Daughter reported that patient has not be drinking enough water recently, feeling very weak compared to her baseline  Patient is infrequently able to ambulate with her walker but is too weak to do so  Recently admitted to [facility] 8/13 - 8/18/20 2nd same complaints and was discharged on megase for poor appetite  In ED -    /89  Before reciving am BP medication   LABS:  WBC 15,340  Creatinine 1 4   EKG shows first-degree AV block, left anterior fascicular block      Chest X Ray - no acute abnormality  ED Tx:  IVF 1 L of NS    Admitted inpatient 9/14/20 at 1316 2nd generalized weakness with ambulatory dysfunction, poor oral intake, moderate protein calorie malnutrition - Tx Plan: continue IVF, Soft diet-pending speech evaluation, aspiration precautions, cotinue megase, Ensure Plus tid, PT + OT evals    ED Triage Vitals   Temperature Pulse Respirations Blood Pressure SpO2   09/14/20 1130 09/14/20 1130 09/14/20 1130 09/14/20 1130 09/14/20 1130   98 2 °F (36 8 °C) 73 19 (!) 185/89 97 %      Temp Source Heart Rate Source Patient Position - Orthostatic VS BP Location FiO2 (%)   09/14/20 1130 09/14/20 1130 09/14/20 2000 09/14/20 2000 --   Tympanic Monitor Lying Left arm       Wt Readings from Last 1 Encounters:   09/14/20 55 4 kg (122 lb 2 2 oz)     Additional Vital Signs:   Date/Time   Temp   Pulse   Resp   BP   MAP (mmHg)   SpO2   O2 Device     09/15/20 0716   98 6 °F (37 °C)   66   20   155/104Abnormal     140   98 %   None (Room air)     09/15/20 0020   99 4 °F (37 4 °C)   60   22   174/79Abnormal     125   95 %   None (Room air)     09/14/20 2000   98 5 °F (36 9 °C)   63   20   155/76   119   98 %   None (Room air)     09/14/20 1500   97 8 °F (36 6 °C)   66   20   153/98   --   97 %   None (Room air)     09/14/20 1320   --   62   18   188/69Abnormal     --   97 %         I/O 09/14 0701   09/15 0700  09/15 0701   -   P O    240    I V  (mL/kg)  1027 5 (18 5)     IV Piggyback  1000     Total Intake(mL/kg)  2027 5 (36 6)  240 (4 3)    Urine (mL/kg/hr)  50     Total Output  50     Net  +1977 5  +240         Unmeasured Urine Occurrence  7 x  1 x    Unmeasured Stool Occurrence  1 x       Pertinent Labs/Diagnostic Test Results:     Results from last 7 days   Lab Units 09/14/20  1222   WBC Thousand/uL 15 34*   HEMOGLOBIN g/dL 12 5   HEMATOCRIT % 37 7   PLATELETS Thousands/uL 205   NEUTROS ABS Thousands/µL 11 97*     Results from last 7 days   Lab Units 09/14/20  1222   SODIUM mmol/L 135   POTASSIUM mmol/L 4 8   CHLORIDE mmol/L 107   CO2 mmol/L 17*   ANION GAP mmol/L 11   BUN mg/dL 19   CREATININE mg/dL 1 40*   EGFR ml/min/1 73sq m 32   CALCIUM mg/dL 8 8   MAGNESIUM mg/dL 1 7     Results from last 7 days   Lab Units 09/14/20  1222   AST U/L 22   ALT U/L 9   ALK PHOS U/L 50 5   TOTAL PROTEIN g/dL 7 3   ALBUMIN g/dL 3 5   TOTAL BILIRUBIN mg/dL 1 25*     Results from last 7 days   Lab Units 09/14/20  1222   GLUCOSE RANDOM mg/dL 102     Results from last 7 days   Lab Units 09/14/20  1222   TROPONIN I ng/mL <0 03     Results from last 7 days   Lab Units 09/14/20  1222   TSH 3RD GENERATON uIU/mL 1 239     Results from last 7 days   Lab Units 09/14/20  1221   CLARITY UA  Slightly Cloudy*   COLOR UA  Yellow   SPEC GRAV UA  1 025   PH UA  5 5   GLUCOSE UA mg/dl Negative   KETONES UA mg/dl Negative   BLOOD UA  2+*   PROTEIN UA mg/dl 1+*   NITRITE UA  Negative   BILIRUBIN UA  Negative   UROBILINOGEN UA E U /dl 0 2   LEUKOCYTES UA  Trace*   WBC UA /hpf 0-5   RBC UA /hpf 0-5   BACTERIA UA /hpf None Seen   EPITHELIAL CELLS WET PREP /hpf Moderate*     CHEST X RAY -  Hypoventilation with borderline cardiomegaly     No focal infiltrate     ED Treatment:   Medication Administration from 09/14/2020 1124 to 09/14/2020 1503       Date/Time Order Dose Route Action     09/14/2020 1332 sodium chloride 0 9 % bolus 1,000 mL 0 mL Intravenous Stopped     09/14/2020 1224 sodium chloride 0 9 % bolus 1,000 mL 1,000 mL Intravenous New Bag     Past Medical History:   Diagnosis Date    Arthritis     reynaulds arthritis    Cancer ([address] )     Hyperlipidemia     Hypertension     Raynaud's disease     Renal disorder     pt states she had kidney disorders long ago, but cant recall the doctor or the problem     Present on Admission:   Dementia without behavioral disturbance ([facility] [address] )    Admitting Diagnosis: No admission diagnoses are documented for this encounter  Age/Sex: 80 y o  female    Admission Orders:  Telemetry  VS q4hrs  Up with assistance  I+O  Diet Dysphagia/Modified Consistency; Dysphagia 3-Dental Soft; Thin Liquid; No Milk, No Caffeine   Dietary nutrition supplements - Banatrol Plus Pudding TID with meals  PT + OT Evals  Speech EVal    Scheduled Medications:  allopurinol, 50 mg, Oral, Daily  amLODIPine, 2 5 mg, Oral, Daily  ergocalciferol, 50,000 Units, Oral, Weekly  FLUoxetine, 40 mg, Oral, Daily  heparin (porcine), 5,000 Units, Subcutaneous, Q8H RENZO  linaCLOtide, 1 capsule, Oral, Daily  megestrol, 40 mg, Oral, 4x Daily  pantoprazole, 40 mg, Oral, Early Morning    Continuous IV Infusions:  IVF dextrose 5 % and sodium chloride 0 9 %, 75 mL/hr, Intravenous, Continuous    PRN Meds:  acetaminophen, 650 mg, Oral, Q6H PRN  ondansetron, 4 mg, Intravenous, Q6H PRN    IP CONSULT TO NUTRITION SERVICES    Network Utilization Review Department  [email or web address]  ATTENTION: Please call with any questions or concerns to [phone] and carefully listen to the prompts so that you are directed to the right person  All voicemails are confidential   Lilian Harp all requests for admission clinical reviews, approved or denied determinations and any other requests to dedicated fax number below belonging to the campus where the patient is receiving treatment   List of dedicated fax numbers for the Facilities:  FACILITY NAME UR FAX NUMBER   ADMISSION DENIALS (Administrative/Medical Necessity) [phone]   [address] (Maternity/NICU/Pediatrics) [phone]   Fabiola Leon [phone]   Bree Goldsmith [phone]   Dmitry Dixon [phone]   Gerri Nyhan [phone]     Denver Natter [address] [facility] [phone]   Leisa Patel [address] [phone] [address] [facility]  [phone]   [address] [place] [address] [phone]

## 2020-09-15 NOTE — PHYSICAL THERAPY NOTE
PHYSICAL THERAPY EVALUATION  Time: 6754-5063    NAME:  Jose Francisco Coffey  DATE: 09/15/20    AGE:   80 y o  Mrn:   [id number]  Length Of Stay: 1    ADMIT DX:  No admission diagnoses are documented for this encounter  Past Medical History:   Diagnosis Date    Arthritis     reynaulds arthritis    [address] [address]     Hyperlipidemia     Hypertension     Raynaud's disease     Renal disorder     pt states she had kidney disorders long ago, but cant recall the doctor or the problem     Past Surgical History:   Procedure Laterality Date    BREAST LUMPECTOMY  01/01/1970    lumpectomy    BREAST RECONSTRUCTION  01/01/1980    Bilaterally    CHOLECYSTECTOMY      COLONOSCOPY N/A 4/3/2017    Procedure: COLONOSCOPY;  Surgeon: Aldair Guajardo MD;  [facility]: [facility]; Service:     ELBOW SURGERY Right     ESOPHAGOGASTRODUODENOSCOPY N/A 4/3/2017    Procedure: ESOPHAGOGASTRODUODENOSCOPY (EGD); Surgeon: Aldair Guajardo MD;  [facility]; Service:     FEMUR SURGERY Right     FRACTURE SURGERY      prem to r femur    JOINT REPLACEMENT Right 01/01/2004    knee    JOINT REPLACEMENT Right     hip    OTHER SURGICAL HISTORY  09/2018    Squamous cells removed from nose    REPLACEMENT TOTAL KNEE Right        Performed at least 2 patient identifiers during session: Name and ID bracelet       09/15/20 0925   PT Last Visit   PT Visit Date 09/15/20   Note Type   Note type Eval/Treat   Pain Assessment   Pain Assessment Tool 0-10   Pain Score No Pain   Home Living   Type of [address] Two level;Stairs to enter with rails; Performs ADLs on one level; Able to live on main level with bedroom/bathroom  (2 SH with 4 BRIELLE then FFSU )   Bathroom Shower/Tub Walk-in shower   Bathroom Toilet Standard   Bathroom Equipment Grab bars in shower;Built-in shower seat   [address] Walker   Additional Comments Pt is VERY [place] and is also a poor/unreliable historian due to cognitive impairments  Pt unable to provide PLOF or home setup  Information obtained from previous hospital admission/therapy records  Prior Function   Level of [place] Needs assistance with IADLs; Needs assistance with ADLs and functional mobility   Lives With Daughter  (& son in law)   Receives Help From Family   ADL Assistance Needs assistance   IADLs Needs assistance   Falls in the last 6 months   (pt unable to recall, no family present)   Comments Per chart, pt previously had assist from family with all ADLs and IADLs, assist prn for ambulation with RW for short household distances  Restrictions/Precautions   Weight Bearing Precautions Per Order No   Other Precautions Cognitive; Chair Alarm; Bed Alarm;Multiple lines;Telemetry; Fall Risk;Hard of hearing   General   Additional Pertinent History Pt VERY [place] (despite B hearing aides) - requiring therapist to write pen/paper for communication  Family/Caregiver Present No   Cognition   Overall Cognitive Status Impaired   Arousal/Participation Cooperative   Orientation Level Oriented to person;Disoriented to place; Disoriented to time;Disoriented to situation   Memory Decreased short term memory;Decreased recall of recent events   Following Commands Follows one step commands inconsistently  (limited due to severe hearing impairment)   RUE Assessment   RUE Assessment WFL   RUE Strength   RUE Overall Strength Within Functional Limits - able to perform ADL tasks with strength   LUE Assessment   LUE Assessment WFL   LUE Strength   LUE Overall Strength Within Functional Limits - able to perform ADL tasks with strength   RLE Assessment   RLE Assessment X   Strength RLE   RLE Overall Strength 3-/5  (grossly, observed functionally, pt unable to complete MMT)   LLE Assessment   LLE Assessment X   Strength LLE   LLE Overall Strength 3-/5  (grossly, observed functionally, pt unable to complete MMT)   Coordination   Movements are Fluid and Coordinated 0   Coordination and Movement Description Impaired B UE coordination due to intention tremors   Sensation WFL   Light Touch   RLE Light Touch Grossly intact   LLE Light Touch Grossly intact   Bed Mobility   Supine to Sit 4  Minimal assistance   Additional items Assist x 1;HOB elevated; Bedrails; Increased time required;Verbal cues; Other  (visual cues)   Sit to Supine Unable to assess   Additional Comments MAXA for scooting at EOB  Pt required DAWSON to maintain upright sitting position at EOB approx 3 minutes prior to gait training  Transfers   Sit to Stand 4  Minimal assistance   Additional items Assist x 1; Increased time required; Other  (visual cues)   Stand to Sit 4  Minimal assistance   Additional items Assist x 1; Increased time required; Other  (visual cues )   Balance   Static Sitting Fair -   Dynamic Sitting Fair -   Static Standing Poor +   Dynamic Standing Poor   Ambulatory Poor   Endurance Deficit   Endurance Deficit Yes   Endurance Deficit Description Pt visibly SOB after minimal activity, resolves quickly  Activity Tolerance   Activity Tolerance Patient limited by fatigue; Other (Comment)  (limited due to hearing and cognitive impairments)   Nurse Made Aware Yes, spoke with DALE Pérez pre/post session    Assessment   Prognosis Fair   Problem List Decreased strength;Decreased endurance; Impaired balance;Decreased mobility; Decreased cognition;Decreased coordination; Impaired judgement;Decreased safety awareness; Impaired hearing   Assessment Pt seen for PT evaluation, after clearance by DALE Pérez, with activity orders of "up with assist"  Pt admitted on 9/14/2020 from home with weakness and increased shakiness, and dx of Failure to thrive (0-17)  Comorbidities affecting pt's functional performance include: arthritis, dementia, GERD, hypertension, hyperlipidemia, DEREK and TKA, renal disorder, breast CA, failure to thrive   Personal factors affecting patient at time of initial evaluation include: ambulating with assistive device, stairs to enter home, communication issues, inability to ambulate household distances, inability to navigate level surfaces without external assistance, inability to perform dynamic tasks in community, decreased cognition, hearing impairments, limited insight into impairments, inability to perform ADLS, inability to perform IADLS  and inability to live alone  Prior to admission, patient was requiring assist for functional mobility with use of RW, requiring assist for ADLS, requiring assist for IADLS, living with family in a [address] in a 2 level home with 4 steps to enter, ambulating household distance and home with family assist  The Barthel Index was used as a functional outcome tool presenting with a score of 40 indicating severe limitations of functional mobility and ADLS  Pt is at risk of falls due to impaired balance, impaired cognition, impaired insight/safety awareness, acuity of medical illness, use of assistive device, advanced age and hearing impairment, baseline level of assist for mobility  Pt's clinical presentation is currently evolving and unstable/unpredictable as seen in patient's presentation of varying levels of cognitive performance, increased fall risk, new onset of impairment of functional mobility, decreased endurance and new onset of weakness  This PT evaluation requires clinical decision making of high complexity, based on multiple medical/physiological/functional factors which affect pt's performance with evaluation and therapist judgement for future treatment sessions  Pt would benefit from continued PT treatment in order to address above impairments, decrease risk of falls, maximize independence with functional mobility, and ensure safety with mobility for transition to next level of care   Based on pt presentation and impairments, pt would most appropriately benefit from return to home health PT services and 24 hr care/supervision from family upon d/c     Barriers to Discharge Inaccessible home environment  (home has 4 BRIELLE)   Goals   Patient Goals unable to state at time of IE, will follow up to obtain as able   STG Expiration Date 09/22/20   Short Term Goal #1 1  Pt will complete all bed mobility in flat bed at CHRIS level, in order to promote increased OOB functional mobility to improve overall activity tolerance  2  Pt will complete all transfers with RW and S, in order to increase safety with functional mobility  3  Pt will ambulate >50ft with RW and S in order to increase safety with household distance functional mobility  4  Pt will negotiate 4 stairs with HR and DAWSON in order to access the first floor of her home  5  Pt will improve B LE strength to >/= 4/5 MMT throughout, in order to increase safety with functional mobility and decrease risk of falls  6  Pt will improve Barthel Index score to >/= 50/100 in order to increase independence and decrease risk of falls  7  Pt will demonstrate understanding and independence with LE strengthening HEP  8  Pt will improve standing balance by >/= 1/2 grade in order to promote safety and increased independence with mobility  9  Pt will tolerate >3hrs OOB in chair with good endurance evidenced by no signif change in vitals or behaviors  PT Treatment Day 1  (See below for treatment note )   Plan   Treatment/Interventions Functional transfer training;LE strengthening/ROM; Elevations; Endurance training; Therapeutic exercise;Cognitive reorientation;Patient/family training;Bed mobility;Gait training;Spoke to nursing;Spoke to case management;OT   PT Frequency 5x/wk   Recommendation   PT Discharge Recommendation Home with skilled therapy  (24 hour care/supervision from family, + HHPT )   Equipment Recommended Walker; Wheelchair  (recommend transport WC for community needs)   Modified [place] Scale   Modified Jacques Scale 4   Barthel Index   Feeding 5   Bathing 0   Grooming Score 0   Dressing Score 5   Bladder Score 5   Bowels Score 10   Toilet Use Score 5   Transfers (Bed/Chair) Score 10   Mobility (Level Surface) Score 0   Stairs Score 0   Barthel Index Score 40           PHYSICAL THERAPY TREATMENT NOTE:    TIME IN: 0905  TIME OUT: 0925  TOTAL TREATMENT TIME: 20 minutes      S: "I don't know what you're saying " Pt VERY [place] (despite B hearing aides) and requires therapist to communicate via pen/paper for successful communication  O: Pt transfers from EOB with DAWSON and RW - visual cues for hand placement on bed as pt pulling up on RW  Pt then ambulates 16ft in room with RW and DAWSON  Pt noted with increased degree of intention tremors in B UEs  Pt also noted with poor problem solving and decreased safety awareness  Pt getting RW wheel caught at chair leg, however unable to problem solve for obstacle negotiation, resulting in increased level of instability in standing and requiring increased therapist assist  At end of session pt was left seated in bedside recliner chair, +chair alarm engaged, needs within reach, care returned to RN      A: Regarding functional mobility, pt continues to present below her baseline level of mobility  Pt limited by hearing impairment, cognitive impairment, decreased strength, decreased activity tolerance, and impaired balance  P: Recommend return to home health PT services and 24hr care/supervision from family once medically cleared for d/c from the acute care setting  If 24hr care from family unable to be provided, pt may benefit from supervised setting in LTC facility       Kadi Lynn PT, DPT  9/15/2020

## 2020-09-15 NOTE — PLAN OF CARE
Problem: Potential for Falls  Goal: Patient will remain free of falls  Description: INTERVENTIONS:  - Assess patient frequently for physical needs  -  Identify cognitive and physical deficits and behaviors that affect risk of falls  -  [place] fall precautions as indicated by assessment   - Educate patient/family on patient safety including physical limitations  - Instruct patient to call for assistance with activity based on assessment  - Modify environment to reduce risk of injury  - Consider OT/PT consult to assist with strengthening/mobility  Outcome: Progressing     Problem: Prexisting or High Potential for Compromised Skin Integrity  Goal: Skin integrity is maintained or improved  Description: INTERVENTIONS:  - Identify patients at risk for skin breakdown  - Assess and monitor skin integrity  - Assess and monitor nutrition and hydration status  - Monitor labs   - Assess for incontinence   - Turn and reposition patient  - Assist with mobility/ambulation  - Relieve pressure over bony prominences  - Avoid friction and shearing  - Provide appropriate hygiene as needed including keeping skin clean and dry  - Evaluate need for skin moisturizer/barrier cream  - Collaborate with interdisciplinary team   - Patient/family teaching  - Consider wound care consult   Outcome: Progressing     Problem: Nutrition/Hydration-ADULT  Goal: Nutrient/Hydration intake appropriate for improving, restoring or maintaining nutritional needs  Description: Monitor and assess patient's nutrition/hydration status for malnutrition  Collaborate with interdisciplinary team and initiate plan and interventions as ordered  Monitor patient's weight and dietary intake as ordered or per policy  Utilize nutrition screening tool and intervene as necessary  Determine patient's food preferences and provide high-protein, high-caloric foods as appropriate       INTERVENTIONS:  - Monitor oral intake, urinary output, labs, and treatment plans  - Assess nutrition and hydration status and recommend course of action  - Evaluate amount of meals eaten  - Assist patient with eating if necessary   - Allow adequate time for meals  - Recommend/ encourage appropriate diets, oral nutritional supplements, and vitamin/mineral supplements  - Order, calculate, and assess calorie counts as needed  - Recommend, monitor, and adjust tube feedings and TPN/PPN based on assessed needs  - Assess need for intravenous fluids  - Provide specific nutrition/hydration education as appropriate  - Include patient/family/caregiver in decisions related to nutrition  Outcome: Progressing

## 2020-09-16 PROBLEM — E43 SEVERE PROTEIN-CALORIE MALNUTRITION (HCC): Status: ACTIVE | Noted: 2020-09-16

## 2020-09-16 LAB — GLUCOSE SERPL-MCNC: 96 MG/DL (ref 65–140)

## 2020-09-16 PROCEDURE — 97530 THERAPEUTIC ACTIVITIES: CPT

## 2020-09-16 PROCEDURE — 99232 SBSQ HOSP IP/OBS MODERATE 35: CPT | Performed by: INTERNAL MEDICINE

## 2020-09-16 PROCEDURE — 82948 REAGENT STRIP/BLOOD GLUCOSE: CPT

## 2020-09-16 RX ADMIN — ALLOPURINOL 50 MG: 100 TABLET ORAL at 09:03

## 2020-09-16 RX ADMIN — MEGESTROL ACETATE 40 MG: 40 TABLET ORAL at 17:35

## 2020-09-16 RX ADMIN — WHITE PETROLATUM 57.7 %-MINERAL OIL 31.9 % EYE OINTMENT 1 APPLICATION: at 22:08

## 2020-09-16 RX ADMIN — SODIUM BICARBONATE 650 MG TABLET 650 MG: at 09:03

## 2020-09-16 RX ADMIN — HEPARIN SODIUM 5000 UNITS: 5000 INJECTION INTRAVENOUS; SUBCUTANEOUS at 22:09

## 2020-09-16 RX ADMIN — MEGESTROL ACETATE 40 MG: 40 TABLET ORAL at 22:10

## 2020-09-16 RX ADMIN — PANTOPRAZOLE SODIUM 40 MG: 40 TABLET, DELAYED RELEASE ORAL at 06:21

## 2020-09-16 RX ADMIN — AMLODIPINE BESYLATE 2.5 MG: 2.5 TABLET ORAL at 09:03

## 2020-09-16 RX ADMIN — SODIUM BICARBONATE 650 MG TABLET 650 MG: at 17:35

## 2020-09-16 RX ADMIN — HEPARIN SODIUM 5000 UNITS: 5000 INJECTION INTRAVENOUS; SUBCUTANEOUS at 15:16

## 2020-09-16 RX ADMIN — FLUOXETINE HYDROCHLORIDE 40 MG: 20 CAPSULE ORAL at 09:02

## 2020-09-16 RX ADMIN — MEGESTROL ACETATE 40 MG: 40 TABLET ORAL at 09:04

## 2020-09-16 RX ADMIN — HEPARIN SODIUM 5000 UNITS: 5000 INJECTION INTRAVENOUS; SUBCUTANEOUS at 06:21

## 2020-09-16 RX ADMIN — DEXTROSE AND SODIUM CHLORIDE 75 ML/HR: 5; .9 INJECTION, SOLUTION INTRAVENOUS at 12:05

## 2020-09-16 NOTE — CASE MANAGEMENT
LOS:2 GMLOS: 3    During rounds attending expressed concerns with family plans to take patient home and requested SW to discuss possible rehab admission  SW called the Daughter to ask her to consider rehab as the doctors have recommended it  Dtr stated that she is concerned mother will not benefit from rehab as it will make her more depressed and anxious leading to further decline  Pt would do better in her own home as she will be mentally happier  Daughter also said, they will consider taking her home as they already have all the needed medical equipment including a rolling walker, an electric bed w/o railing (not hospital bed) and wheelchair  Patient is very close to her dog; therefore, dtr requested SW to ask the nurses if they can implement techniques (such as talking about her dog, writing sentences on paper as she has hearing loss, and giving her Ensure), to motivate the patient to participate with therapy and increase her intake  SW advised dtr that as of today, pt is mostly bedbound  She has not walked more than 4ft with therapy  She requires a max assist with ADLs including toileting  Dtr confirmed that son would be the primary caregiver during the day  SW asked if son would be comfortable with changing his mother's brief as this is new-dtr could not confirm  SW expressed concern for both pt and family's safety as pt is requiring an increased amount of care  Dtr and son are unable to come to hospital until after dtr gets out of work this evening  SW requested they evaluate pt for themselves and meet with the physician to make a decision if they feel they can safely take care of patient at home  Attending made aware of above  They will meet with family on arrival to make decision on discharge disposition

## 2020-09-16 NOTE — PLAN OF CARE
Problem: Potential for Falls  Goal: Patient will remain free of falls  Description: INTERVENTIONS:  - Assess patient frequently for physical needs  -  Identify cognitive and physical deficits and behaviors that affect risk of falls  -  [place] fall precautions as indicated by assessment   - Educate patient/family on patient safety including physical limitations  - Instruct patient to call for assistance with activity based on assessment  - Modify environment to reduce risk of injury  - Consider OT/PT consult to assist with strengthening/mobility  Outcome: Progressing     Problem: Prexisting or High Potential for Compromised Skin Integrity  Goal: Skin integrity is maintained or improved  Description: INTERVENTIONS:  - Identify patients at risk for skin breakdown  - Assess and monitor skin integrity  - Assess and monitor nutrition and hydration status  - Monitor labs   - Assess for incontinence   - Turn and reposition patient  - Assist with mobility/ambulation  - Relieve pressure over bony prominences  - Avoid friction and shearing  - Provide appropriate hygiene as needed including keeping skin clean and dry  - Evaluate need for skin moisturizer/barrier cream  - Collaborate with interdisciplinary team   - Patient/family teaching  - Consider wound care consult   Outcome: Progressing     Problem: SAFETY ADULT  Goal: Patient will remain free of falls  Description: INTERVENTIONS:  - Assess patient frequently for physical needs  -  Identify cognitive and physical deficits and behaviors that affect risk of falls    -  [place] fall precautions as indicated by assessment   - Educate patient/family on patient safety including physical limitations  - Instruct patient to call for assistance with activity based on assessment  - Modify environment to reduce risk of injury  - Consider OT/PT consult to assist with strengthening/mobility  Outcome: Progressing

## 2020-09-16 NOTE — NURSING NOTE
Medical resident Daisy Gandhi made aware of this Nurses concern in patient's condition  Patient more weak than usual  Daisy Gandhi in room to see patient  Will continue to monitor

## 2020-09-16 NOTE — UTILIZATION REVIEW
Continued Stay Review    Date: 9/15/20                        Current Patient Class: Inpatient Current Level of Care: Med Surg    HPI:89 y o  female initially admitted on 9/14/20 for evaluation and treatment of generalized weakness with ambulatory dysfunction, poor oral intake, moderate protein calorie malnutrition  Placed on IV fluids  PT/OT and Speech to evaluate  9/15 Internal Medicine: Lethargic, poor oral intake  Alert and oriented x 0 at baseline  Continue IV hydration  Encourage PO intake with full assist feed  Start bicarb tablets 600 mg PO BID  Speech Therapy:  Pt presents with swallowing skills grossly WFL, however pt  Is resistive to intake overall on materials presented during this assessment  Recommendations:  Diet: soft/level 3 diet and thin liquids  Meds: whole with liquid, whole with puree and crushed with puree  Aspiration precautions and compensatory swallowing  strategies: upright posture, only feed when fully alert  May need alternate source of nutrition if intake continues to decline   Physical Therapy recommendation: Home with 24 hour care/supervision from family and HHPT when medically cleared for D/C        Pertinent Labs/Diagnostic Results:       Results from last 7 days   Lab Units 09/15/20  1527 09/14/20  1222   WBC Thousand/uL 12 57* 15 34*   HEMOGLOBIN g/dL 11 6 12 5   HEMATOCRIT % 35 8 37 7   PLATELETS Thousands/uL 202 205   NEUTROS ABS Thousands/µL 9 67* 11 97*         Results from last 7 days   Lab Units 09/15/20  1527 09/14/20  1222   SODIUM mmol/L 139 135   POTASSIUM mmol/L 4 1 4 8   CHLORIDE mmol/L 112* 107   CO2 mmol/L 17* 17*   ANION GAP mmol/L 10 11   BUN mg/dL 16 19   CREATININE mg/dL 1 14* 1 40*   EGFR ml/min/1 73sq m 41 32   CALCIUM mg/dL 7 7* 8 8   MAGNESIUM mg/dL  --  1 7     Results from last 7 days   Lab Units 09/14/20  1222   AST U/L 22   ALT U/L 9   ALK PHOS U/L 50 5   TOTAL PROTEIN g/dL 7 3   ALBUMIN g/dL 3 5   TOTAL BILIRUBIN mg/dL 1 25*     Results from last 7 days   Lab Units 09/16/20  0255   POC GLUCOSE mg/dl 96     Results from last 7 days   Lab Units 09/15/20  1527 09/14/20  1222   GLUCOSE RANDOM mg/dL 127 102             Results from last 7 days   Lab Units 09/14/20  1222   TROPONIN I ng/mL <0 03             Results from last 7 days   Lab Units 09/14/20  1222   TSH 3RD GENERATON uIU/mL 1 239           Results from last 7 days   Lab Units 09/14/20  1221   CLARITY UA  Slightly Cloudy*   COLOR UA  Yellow   SPEC GRAV UA  1 025   PH UA  5 5   GLUCOSE UA mg/dl Negative   KETONES UA mg/dl Negative   BLOOD UA  2+*   PROTEIN UA mg/dl 1+*   NITRITE UA  Negative   BILIRUBIN UA  Negative   UROBILINOGEN UA E U /dl 0 2   LEUKOCYTES UA  Trace*   WBC UA /hpf 0-5   RBC UA /hpf 0-5   BACTERIA UA /hpf None Seen   EPITHELIAL CELLS WET PREP /hpf Moderate*       Vital Signs:       Date/Time   Temp   Pulse   Resp   BP   MAP (mmHg)   SpO2   O2 Device    09/15/20 2143   --   --   --   136/85   --   --   --    09/15/20 1900   99 1   90   24Abnormal     155/77   116   96 %   None (Room air)    09/15/20 1441   100   80   28Abnormal     139/73   --   95 %   None (Room air)    09/15/20 0716   98 6   66   20   155/104    140   98 %   None (Room air)    09/15/20 0020   99 4   60   22   174/79  125   95 %   None (Room air)            Medications:   Scheduled Medications:  allopurinol, 50 mg, Oral, Daily  amLODIPine, 2 5 mg, Oral, Daily  artificial tear, , Both Eyes, HS  ergocalciferol, 50,000 Units, Oral, Weekly  FLUoxetine, 40 mg, Oral, Daily  heparin (porcine), 5,000 Units, Subcutaneous, Q8H RENZO  megestrol, 40 mg, Oral, 4x Daily  pantoprazole, 40 mg, Oral, Early Morning  sodium bicarbonate, 650 mg, Oral, BID after meals      Continuous IV Infusions:    dextrose 5 % and sodium chloride 0 9 % infusion    Rate: 75 mL/hr Dose: 75 mL/hr  Freq: Continuous Route: IV  Start: 09/14/20 1515       PRN Meds:  acetaminophen, 650 mg, Oral, Q6H PRN  ondansetron, 4 mg, Intravenous, Q6H PRN        Discharge Plan: TBD        Network Utilization Review Department  [email or web address]  ATTENTION: Please call with any questions or concerns to [phone] and carefully listen to the prompts so that you are directed to the right person  All voicemails are confidential   Valeta Pastel all requests for admission clinical reviews, approved or denied determinations and any other requests to dedicated fax number below belonging to the campus where the patient is receiving treatment   List of dedicated fax numbers for the Facilities:  [address] DENIALS (Administrative/Medical Necessity) [phone]   [address] (Maternity/NICU/Pediatrics) [phone]   Dayday Stover [phone]   Aleksandr Major [phone]   Nayely Perez [phone]   Erica Flood [phone]   [address] [place] [address] [phone]   [facility]  [phone]   [address] [place] [address], [address]  [address] [facility] [address] [facility] [phone]

## 2020-09-16 NOTE — PLAN OF CARE
Problem: PHYSICAL THERAPY ADULT  Goal: Performs mobility at highest level of function for planned discharge setting  See evaluation for individualized goals  Description: Treatment/Interventions: Functional transfer training, LE strengthening/ROM, Therapeutic exercise, Elevations, Endurance training, Cognitive reorientation, Bed mobility, Gait training, Spoke to nursing, Spoke to case management, OT  Equipment Recommended: Obie Castleman, Wheelchair(recommend transport WC for community needs )       See flowsheet documentation for full assessment, interventions and recommendations  Outcome: Progressing  Note: Prognosis: Fair  Problem List: Decreased strength, Decreased endurance, Impaired balance, Decreased mobility, Decreased coordination, Decreased cognition, Impaired judgement, Decreased safety awareness, Impaired hearing, Decreased skin integrity, Pain  Assessment: Pt seen for PT treatment today with focus on gait training and OOB mobility  Pt presents sleeping soundly in bed, easily awoke however required max encouragement and stimulation for pt active engagement with therapist  Pt c/o pain in R wrist/arm (RN reports recent change of IV)  Pt requires [address] for bed mobility in hospital bed on this date  Transfers STS with DAWSON and RW  Pt noted with moderate sized BM - RN aware  Pt tolerates approx 20 seconds of supported standing at EOB for dependent hygiene care, requires x2 seated rest breaks during hygiene care  Pt then ambulates 4ft with RW and DAWSON, however refuses further due to fatigue and impulsively attempts to sit in recliner chair with uncontrolled descent to surface  Pt requires max encouragement and assist for feeding/eating breakfast  At end of session pt was left seated in bedside recliner chair with all needs in place and lines intact, chair alarm engaged  Care returned to RN  Continue to recommend HHPT and 24hr S/care from family upon d/c  Will continue skilled PT POC as able and appropriate  Barriers to Discharge: Inaccessible home environment(pt has 4 BRIELLE )     PT Discharge Recommendation: Home with skilled therapy(24 hour care/supervision from family, + HHPT )          See flowsheet documentation for full assessment

## 2020-09-16 NOTE — PROGRESS NOTES
Progress Note - Madai Cadena 3/14/1924, 80 y o  female MRN: [id number]    Unit/Bed#: -Chris Encounter: [id number]    Primary Care Provider: Xochitl Lee MD   Date and time admitted to hospital: 2020 11:24 AM        * Failure to thrive (0-17)  Assessment & Plan    Ambulatory dysfunction - Poor oral intake - Dehydration  Need more hydration-continue D5 NS at 75 cc/hour  Nutrition on board  [facility] Speech evaluate patient-continue mechanical soft diet  Maintain aspiration precautions  Continue megase  PT/OT    Dementia without behavioral disturbance ([facility] [address] )  Assessment & Plan  Stable  At baseline  PT/OT on board    Severe protein-calorie malnutrition ([facility] [address] )  Assessment & Plan  Malnutrition Findings:      ·   Ensure Plus t i d                Nutrition on board    BMI Findings: Body mass index is 28 39 kg/m²  CKD (chronic kidney disease) stage 4, GFR 15-29 ml/min ([facility])  Assessment & Plan  chronic met metabolic acidosis-start bicarb tablets 600 b i d  stable-creatinine at the baseline    Gout  Assessment & Plan  · continue allopurinol-renally dosed        VTE Pharmacologic Prophylaxis:   Pharmacologic: Heparin  Mechanical VTE Prophylaxis in Place: Yes    Discussions with Specialists or Other Care Team Provider: yes    Education and Discussions with Family / Patient: yes    Current Length of Stay: 2 day(s)    Current Patient Status: Inpatient     Discharge Plan / Estimated Discharge Date: 24-48 hrs    Code Status: Level 3 - DNAR and DNI      Subjective:   Patient awake and alert  Sitting on the recliner  Refusing to eat  Breathing normally on room air  Looks weak  No overnight events reported  Objective:     Vitals:   Temp (24hrs), Av 8 °F (37 1 °C), Min:98 3 °F (36 8 °C), Max:99 2 °F (37 3 °C)    Temp:  [98 3 °F (36 8 °C)-99 2 °F (37 3 °C)] 98 3 °F (36 8 °C)  HR:  [61-90] 61  Resp:  [17-24] 17  BP: (136-155)/(46-92) 148/92  SpO2:  [95 %-99 %] 95 %  Body mass index is 28 39 kg/m²  Input and Output Summary (last 24 hours): Intake/Output Summary (Last 24 hours) at 9/16/2020 1714  Last data filed at 9/16/2020 1558  Gross per 24 hour   Intake 1700 ml   Output --   Net 1700 ml       Physical Exam:     Physical Exam  Constitutional:       Appearance: She is ill-appearing  Cardiovascular:      Heart sounds: Normal heart sounds  Pulmonary:      Breath sounds: Normal breath sounds  Abdominal:      General: Bowel sounds are normal       Palpations: Abdomen is soft  Skin:     General: Skin is warm and dry  Neurological:      Mental Status: She is alert  Additional Data:     Labs:    Results from last 7 days   Lab Units 09/15/20  1527   WBC Thousand/uL 12 57*   HEMOGLOBIN g/dL 11 6   HEMATOCRIT % 35 8   PLATELETS Thousands/uL 202   NEUTROS PCT % 77*   LYMPHS PCT % 12*   MONOS PCT % 11   EOS PCT % 0     Results from last 7 days   Lab Units 09/15/20  1527 09/14/20  1222   POTASSIUM mmol/L 4 1 4 8   CHLORIDE mmol/L 112* 107   CO2 mmol/L 17* 17*   BUN mg/dL 16 19   CREATININE mg/dL 1 14* 1 40*   CALCIUM mg/dL 7 7* 8 8   ALK PHOS U/L  --  50 5   ALT U/L  --  9   AST U/L  --  22           * I Have Reviewed All Lab Data Listed Above  * Additional Pertinent Lab Tests Reviewed:  Susan [address] Admission Reviewed    Imaging:    Imaging Reports Reviewed Today Include: na  Imaging Personally Reviewed by Myself Includes:  na    Recent Cultures (last 7 days):           Last 24 Hours Medication List:   Current Facility-Administered Medications   Medication Dose Route Frequency Provider Last Rate    acetaminophen  650 mg Oral Q6H PRN Nicolasa Parker MD      allopurinol  50 mg Oral Daily Vargas Goode MD      amLODIPine  2 5 mg Oral Daily Vargas Goode MD      artificial tear   Both Eyes HS Maria Isabel Salinas MD      dextrose 5 % and sodium chloride 0 9 %  75 mL/hr Intravenous Continuous Vargas Goode MD 75 mL/hr (09/16/20 1205)    ergocalciferol  50,000 Units Oral Weekly Juni Feliciano MD      FLUoxetine  40 mg Oral Daily Vargas Goode MD      heparin (porcine)  5,000 Units Subcutaneous Q8H [address] Vargas Goode MD      megestrol  40 mg Oral 4x Daily Vargas Goode MD      ondansetron  4 mg Intravenous Q6H PRN Juni Feliciano MD      pantoprazole  40 mg Oral Early Morning Vargas Goode MD      sodium bicarbonate  650 mg Oral BID after meals Juni Feliciano MD          Today, Patient Was Seen By: Megan Beth MD    ** Please Note: This note has been constructed using a voice recognition system   **

## 2020-09-16 NOTE — ASSESSMENT & PLAN NOTE
Malnutrition Findings:      ·   Ensure Plus t i d                Nutrition on board    BMI Findings: Body mass index is 28 39 kg/m²

## 2020-09-16 NOTE — PHYSICAL THERAPY NOTE
PHYSICAL THERAPY TREATMENT    NAME:  Praveen Speaker  DATE: 09/16/20    AGE:   80 y o  Mrn:   [id number]  Length Of Stay: 2    ADMIT DX:  Dehydration [E86 0]    Past Medical History:   Diagnosis Date    Arthritis     reynaulds arthritis    Cancer ([facility] [address] )     Hyperlipidemia     Hypertension     Raynaud's disease     Renal disorder     pt states she had kidney disorders long ago, but cant recall the doctor or the problem     Past Surgical History:   Procedure Laterality Date    BREAST LUMPECTOMY  01/01/1970    lumpectomy    BREAST RECONSTRUCTION  01/01/1980    Bilaterally    CHOLECYSTECTOMY      COLONOSCOPY N/A 4/3/2017    Procedure: COLONOSCOPY;  Surgeon: Rajinder Ochoa MD;  [facility]; Service:     ELBOW SURGERY Right     ESOPHAGOGASTRODUODENOSCOPY N/A 4/3/2017    Procedure: ESOPHAGOGASTRODUODENOSCOPY (EGD); Surgeon: Rajinder Ochoa MD;  [facility]; Service:     FEMUR SURGERY Right     FRACTURE SURGERY      prem to r femur    JOINT REPLACEMENT Right 01/01/2004    knee    JOINT REPLACEMENT Right     hip    OTHER SURGICAL HISTORY  09/2018    Squamous cells removed from nose    REPLACEMENT TOTAL KNEE Right        Performed at least 2 patient identifiers during session: Name and ID bracelet       09/16/20 0855   PT Last Visit   PT Visit Date 09/16/20   Pain Assessment   Pain Assessment Tool Tai-Baker FACES   Tai-Baker FACES Pain Rating 4   Pain Location/Orientation Orientation: Right;Location: Arm   Pain Onset/Description Onset: Ongoing   Effect of Pain on Daily Activities limits use of R UE in functional mobility    Patient's Stated Pain Goal No pain   Hospital Pain Intervention(s) Repositioned; Emotional support; Ambulation/increased activity   Restrictions/Precautions   Weight Bearing Precautions Per Order No   Other Precautions Cognitive; Chair Alarm; Bed Alarm; Fall Risk;Pain;Multiple lines;Telemetry;Hard of hearing   General   Chart Reviewed Yes   Response to Previous Treatment Patient with no complaints from previous session  Family/Caregiver Present No   Cognition   Overall Cognitive Status Impaired   Arousal/Participation Lethargic   Attention Difficulty attending to directions   Orientation Level Oriented to person;Disoriented to situation;Disoriented to time;Disoriented to place   Memory Decreased short term memory;Decreased recall of recent events   Following Commands Follows one step commands inconsistently  (limited due to cognitive and hearing impairment)   Subjective   Subjective "I hurt"    Bed Mobility   Rolling R 2  Maximal assistance   Additional items Assist x 1;Bedrails; Increased time required;Verbal cues;LE management   Supine to Sit 2  Maximal assistance   Additional items Assist x 1;HOB elevated; Increased time required;Verbal cues;LE management   Sit to Supine Unable to assess   Transfers   Sit to Stand 4  Minimal assistance   Additional items Assist x 1; Increased time required;Verbal cues  (visual cues )   Stand to Sit 4  Minimal assistance   Additional items Assist x 1; Armrests; Increased time required;Verbal cues  (visual cues)   Additional Comments x4 STS transfers completed t/o session, all with DAWSON and requiring verb/vis cues for safe hand placement    Ambulation/Elevation   Gait pattern Decreased foot clearance; Short stride; Shuffling   Gait Assistance 4  Minimal assist   Additional items Assist x 1; Tactile cues; Verbal cues   Assistive Device Rolling walker   Distance 4ft x1  (pt refuses further due to fatigue )   Stair Management Assistance Not tested   Balance   Static Sitting Fair   Dynamic Sitting Fair -   Static Standing Fair   Dynamic Standing Poor   Ambulatory Poor   Endurance Deficit   Endurance Deficit Yes   Endurance Deficit Description Pt with generalized fatigue post minimal mobility; pt unable to tolerate >20seconds of standing supported   Activity Tolerance   Activity Tolerance Patient limited by fatigue;Patient limited by pain;Other (Comment)  (limited by hearing and cognitive impairments )   Nurse Made Aware Yes, spoke with RN May pre/post session    Assessment   Prognosis Fair   Problem List Decreased strength;Decreased endurance; Impaired balance;Decreased mobility; Decreased coordination;Decreased cognition; Impaired judgement;Decreased safety awareness; Impaired hearing;Decreased skin integrity;Pain   Assessment Pt seen for PT treatment today with focus on gait training and OOB mobility  Pt presents sleeping soundly in bed, easily awoke however required max encouragement and stimulation for pt active engagement with therapist  Pt c/o pain in R wrist/arm (RN reports recent change of IV)  Pt requires [address] for bed mobility in hospital bed on this date  Transfers STS with DAWSON and RW  Pt noted with moderate sized BM - RN aware  Pt tolerates approx 20 seconds of supported standing at EOB for dependent hygiene care, requires x2 seated rest breaks during hygiene care  Pt then ambulates 4ft with RW and DAWSON, however refuses further due to fatigue and impulsively attempts to sit in recliner chair with uncontrolled descent to surface  Pt requires max encouragement and assist for feeding/eating breakfast  At end of session pt was left seated in bedside recliner chair with all needs in place and lines intact, chair alarm engaged  Care returned to RN  Continue to recommend HHPT and 24hr S/care from family upon d/c  Will continue skilled PT POC as able and appropriate  Barriers to Discharge Inaccessible home environment  (pt has 4 BRIELLE )   Goals   Patient Goals none stated    STG Expiration Date 09/22/20   Short Term Goal #1 1  Pt will complete all bed mobility in flat bed at CHRIS level, in order to promote increased OOB functional mobility to improve overall activity tolerance  2  Pt will complete all transfers with RW and S, in order to increase safety with functional mobility   3  Pt will ambulate >50ft with RW and S in order to increase safety with household distance functional mobility  4  Pt will negotiate 4 stairs with HR and DAWSON in order to access the first floor of her home  5  Pt will improve B LE strength to >/= 4/5 MMT throughout, in order to increase safety with functional mobility and decrease risk of falls  6  Pt will improve Barthel Index score to >/= 50/100 in order to increase independence and decrease risk of falls  7  Pt will demonstrate understanding and independence with LE strengthening HEP  8  Pt will improve standing balance by >/= 1/2 grade in order to promote safety and increased independence with mobility  9  Pt will tolerate >3hrs OOB in chair with good endurance evidenced by no signif change in vitals or behaviors  PT Treatment Day 2   Plan   Treatment/Interventions Functional transfer training;LE strengthening/ROM; Therapeutic exercise;Elevations; Endurance training;Cognitive reorientation; Bed mobility;Gait training;Spoke to nursing;Spoke to case management;OT   Progress Slow progress, cognitive deficits   PT Frequency 5x/wk   Recommendation   PT Discharge Recommendation Home with skilled therapy  (24 hour care/supervision from family, + HHPT )   Equipment Recommended Walker; Wheelchair  (recommend transport [address] for community needs )       Time in: 0815  Time out: 0855  Total treatment time: 40 minutes     Elinor Chaparro PT, DPT  9/16/2020

## 2020-09-16 NOTE — ASSESSMENT & PLAN NOTE
Ambulatory dysfunction - Poor oral intake - Dehydration  Need more hydration-continue D5 NS at 75 cc/hour  Nutrition on board  Gumaro Mercado Speech evaluate patient-continue mechanical soft diet    Maintain aspiration precautions  Continue megase  PT/OT

## 2020-09-17 VITALS
WEIGHT: 122.14 LBS | TEMPERATURE: 98.9 F | HEIGHT: 55 IN | HEART RATE: 73 BPM | SYSTOLIC BLOOD PRESSURE: 152 MMHG | RESPIRATION RATE: 18 BRPM | DIASTOLIC BLOOD PRESSURE: 80 MMHG | BODY MASS INDEX: 28.27 KG/M2 | OXYGEN SATURATION: 96 %

## 2020-09-17 LAB
ANION GAP SERPL CALCULATED.3IONS-SCNC: 8 MMOL/L (ref 4–13)
BASOPHILS # BLD AUTO: 0.03 THOUSANDS/ΜL (ref 0–0.1)
BASOPHILS NFR BLD AUTO: 0 % (ref 0–1)
BUN SERPL-MCNC: 8 MG/DL (ref 6–20)
CALCIUM SERPL-MCNC: 7.1 MG/DL (ref 8.4–10.2)
CHLORIDE SERPL-SCNC: 112 MMOL/L (ref 96–108)
CO2 SERPL-SCNC: 21 MMOL/L (ref 22–33)
CREAT SERPL-MCNC: 1 MG/DL (ref 0.4–1.1)
EOSINOPHIL # BLD AUTO: 0.03 THOUSAND/ΜL (ref 0–0.61)
EOSINOPHIL NFR BLD AUTO: 0 % (ref 0–6)
ERYTHROCYTE [DISTWIDTH] IN BLOOD BY AUTOMATED COUNT: 15.1 % (ref 11.6–15.1)
GFR SERPL CREATININE-BSD FRML MDRD: 48 ML/MIN/1.73SQ M
GLUCOSE SERPL-MCNC: 104 MG/DL (ref 65–140)
HCT VFR BLD AUTO: 33.4 % (ref 34.8–46.1)
HGB BLD-MCNC: 11.1 G/DL (ref 11.5–15.4)
IMM GRANULOCYTES # BLD AUTO: 0.03 THOUSAND/UL (ref 0–0.2)
IMM GRANULOCYTES NFR BLD AUTO: 0 % (ref 0–2)
LYMPHOCYTES # BLD AUTO: 1.86 THOUSANDS/ΜL (ref 0.6–4.47)
LYMPHOCYTES NFR BLD AUTO: 15 % (ref 14–44)
MCH RBC QN AUTO: 30 PG (ref 26.8–34.3)
MCHC RBC AUTO-ENTMCNC: 33.2 G/DL (ref 31.4–37.4)
MCV RBC AUTO: 90 FL (ref 82–98)
MONOCYTES # BLD AUTO: 1.5 THOUSAND/ΜL (ref 0.17–1.22)
MONOCYTES NFR BLD AUTO: 12 % (ref 4–12)
NEUTROPHILS # BLD AUTO: 9.06 THOUSANDS/ΜL (ref 1.85–7.62)
NEUTS SEG NFR BLD AUTO: 73 % (ref 43–75)
PLATELET # BLD AUTO: 197 THOUSANDS/UL (ref 149–390)
PMV BLD AUTO: 11.5 FL (ref 8.9–12.7)
POTASSIUM SERPL-SCNC: 3.1 MMOL/L (ref 3.5–5)
RBC # BLD AUTO: 3.7 MILLION/UL (ref 3.81–5.12)
SODIUM SERPL-SCNC: 141 MMOL/L (ref 133–145)
WBC # BLD AUTO: 12.51 THOUSAND/UL (ref 4.31–10.16)

## 2020-09-17 PROCEDURE — 97530 THERAPEUTIC ACTIVITIES: CPT

## 2020-09-17 PROCEDURE — 85025 COMPLETE CBC W/AUTO DIFF WBC: CPT | Performed by: INTERNAL MEDICINE

## 2020-09-17 PROCEDURE — 97116 GAIT TRAINING THERAPY: CPT

## 2020-09-17 PROCEDURE — 97167 OT EVAL HIGH COMPLEX 60 MIN: CPT

## 2020-09-17 PROCEDURE — 99239 HOSP IP/OBS DSCHRG MGMT >30: CPT | Performed by: INTERNAL MEDICINE

## 2020-09-17 PROCEDURE — 80048 BASIC METABOLIC PNL TOTAL CA: CPT | Performed by: INTERNAL MEDICINE

## 2020-09-17 RX ORDER — MINERAL OIL AND PETROLATUM 150; 830 MG/G; MG/G
OINTMENT OPHTHALMIC
Qty: 1 TUBE | Refills: 0 | Status: SHIPPED | OUTPATIENT
Start: 2020-09-17

## 2020-09-17 RX ORDER — AMLODIPINE BESYLATE 2.5 MG/1
2.5 TABLET ORAL DAILY
Qty: 30 TABLET | Refills: 0 | Status: SHIPPED | OUTPATIENT
Start: 2020-09-18

## 2020-09-17 RX ORDER — MEGESTROL ACETATE 40 MG/1
40 TABLET ORAL 4 TIMES DAILY
Qty: 160 TABLET | Refills: 0 | Status: SHIPPED | OUTPATIENT
Start: 2020-09-17

## 2020-09-17 RX ADMIN — HEPARIN SODIUM 5000 UNITS: 5000 INJECTION INTRAVENOUS; SUBCUTANEOUS at 06:15

## 2020-09-17 RX ADMIN — HEPARIN SODIUM 5000 UNITS: 5000 INJECTION INTRAVENOUS; SUBCUTANEOUS at 15:26

## 2020-09-17 RX ADMIN — PANTOPRAZOLE SODIUM 40 MG: 40 TABLET, DELAYED RELEASE ORAL at 09:59

## 2020-09-17 RX ADMIN — ALLOPURINOL 50 MG: 100 TABLET ORAL at 09:57

## 2020-09-17 RX ADMIN — FLUOXETINE HYDROCHLORIDE 40 MG: 20 CAPSULE ORAL at 09:57

## 2020-09-17 RX ADMIN — SODIUM BICARBONATE 650 MG TABLET 650 MG: at 09:57

## 2020-09-17 RX ADMIN — AMLODIPINE BESYLATE 2.5 MG: 2.5 TABLET ORAL at 09:57

## 2020-09-17 RX ADMIN — MEGESTROL ACETATE 40 MG: 40 TABLET ORAL at 09:57

## 2020-09-17 RX ADMIN — MEGESTROL ACETATE 40 MG: 40 TABLET ORAL at 12:54

## 2020-09-17 NOTE — OCCUPATIONAL THERAPY NOTE
Occupational Therapy Evaluation       09/17/20 0905   Note Type   Note type Eval only   Pain Assessment   Pain Assessment Tool Tai-Baker FACES   Pain Score No Pain   Home Living   Type of Home House   Home Layout Two level;Stairs to enter with rails; Performs ADLs on one level; Able to live on main level with bedroom/bathroom  (2 + 4 BRIELLE)   Bathroom Shower/Tub Walk-in shower   Bathroom Toilet Standard   Bathroom Equipment Grab bars in shower;Built-in [address]   Additional Comments Patient is a poor historian due to [facility] and impaired cognition; PLOF and home setup obtained from previous therapy documentation   Prior Function   Level of [place] Needs assistance with ADLs and functional mobility   Lives With Daughter   Receives Help From Family   ADL Assistance Needs assistance   IADLs Needs assistance   Comments Per chart patient has assist with all ADLs and iADLs, was assist PRN for ambulation with RW short household distances   ADL   Eating Assistance 4  Minimal Assistance   Eating Deficit   (Simulated, pt declining to eat or drink)   Grooming Assistance 4  Minimal Assistance   UB Bathing Assistance 2  Maximal Assistance   LB Bathing Assistance 2  Maximal Assistance   UB Dressing Assistance 2  Maximal Assistance   LB Dressing Assistance 2  Maximal [address]  1  Total Assistance   Bed Mobility   Supine to Sit 3  Moderate assistance   Additional items Assist x 1; Increased time required;HOB elevated   Transfers   Sit to Stand 4  Minimal assistance   Additional items Assist x 1;Verbal cues   Stand to Sit 4  Minimal assistance   Additional items Assist x 1;Verbal cues   Stand pivot 4  Minimal assistance   Additional items Assist x 1;Verbal cues  (RW)   Functional Mobility   Functional Mobility 4  Minimal assistance   Additional Comments Patient ambulated few feet with RW to recliner chair with min assist, mod verbal and tactile cues for safe RW management; patient declined further mobility after sitting in chair   Balance   Static Sitting Fair   Dynamic Sitting Fair -   Static Standing Poor +   Dynamic Standing Poor   Activity Tolerance   Activity Tolerance Patient limited by fatigue; Other (Comment)  (Limited by [facility], limited by cognition)   RUE Assessment   RUE Assessment WFL   LUE Assessment   LUE Assessment WFL   Cognition   Overall Cognitive Status Impaired   Arousal/Participation Alert; Cooperative   Attention Difficulty attending to directions   Orientation Level Oriented to person;Disoriented to place; Disoriented to time   Following Commands Follows one step commands inconsistently   Comments Patient is limited by baseline dementia and [place]  Patient is alert and oriented to self  Answering "yes" to all questions but not appearing to understand the questions  When asked if patient was hungry and wanted to eat breakfast she responded 'yes' but when offered food pushed it away and said "no"   Assessment   Limitation Decreased ADL status; Decreased UE strength;Decreased Safe judgement during ADL;Decreased endurance;Decreased self-care trans;Decreased high-level ADLs   Prognosis Good   Assessment Patient evaluated by Occupational Therapy  Patient admitted with Failure to thrive (0-17)  The patients occupational profile, medical and therapy history includes a extensive additional review of physical, cognitive, or psychosocial history related to current functional performance  Comorbidities affecting functional mobility and ADLS include: cancer, HLD, HTN , arthritis, dementia  Prior to admission, patient was requiring assist for functional mobility with RW, requiring assist for ADLS, requiring assist for IADLS and having 24 hour care     The evaluation identifies the following performance deficits: weakness, impaired balance, decreased endurance, decreased coordination, increased fall risk, new onset of impairment of functional mobility, decreased ADLS, decreased IADLS, decreased activity tolerance, decreased safety awareness, impaired judgement, decreased cognition and decreased strength, that result in activity limitations and/or participation restrictions  This evaluation requires clinical decision making of high complexity, because the patient presents with comorbidites that affect occupational performance and required significant modification of tasks or assistance with consideration of multiple treatment options  The Barthel Index was used as a functional outcome tool presenting with a score of 35, indicating marked limitations of functional mobility and ADLS  Patient will benefit from skilled Occupational Therapy services to address above deficits and facilitate a safe return to prior level of function  Goals   Patient Goals Pt unable to state due to impaired cognition   STG Time Frame   (STG=LTG)   LTG Time Frame   (1-7 days)   Long Term Goal Patient will increase standing tolerance to 10 minutes during ADL task to decrease assistance level and decrease fall risk; Patient will increase bed mobility to supervision in preparation for ADLS and transfers;  Patient will increase functional mobility to and from bathroom with rolling walker with supervision to increase performance with ADLS and to use a toilet; Patient will tolerate 10 minutes of UE ROM/strengthening to increase general activity tolerance and performance in ADLS/IADLS; Patient will improve functional activity tolerance to 10 minutes of sustained functional tasks to increase participation in basic self-care and decrease assistance level;  Patient will be able to to verbalize understanding and perform energy conservation/proper body mechanics during ADLS and functional mobility at least 50% of the time with moderate cueing to decrease signs of fatigue and increase stamina to return to prior level of function; Patient will increase static/dynamic sitting balance to fair to improve the ability to sit at edge of bed or on a chair for ADLS;  Patient will increase static/dynamic standing balance to fair- to improve postural stability and decrease fall risk during standing ADLS and transfers  Functional Transfer Goals   Pt Will Perform All Functional Transfers   (LTG supervision)   ADL Goals   Pt Will Perform Eating   (LTG supervision)   Pt Will Perform Grooming   (LTG supervision)   Pt Will Perform Bathing   (LTG mod assist)   Pt Will Perform UE Dressing   (LTG min assist)   Pt Will Perform LE Dressing   (LTG mod assist)   Pt Will Perform Toileting   (LTG min assist)   Plan   Treatment Interventions ADL retraining;Functional transfer training;UE strengthening/ROM; Endurance training;Cognitive reorientation;Patient/family training;Equipment evaluation/education; Compensatory technique education;Continued evaluation; Energy conservation; Activityengagement   Goal Expiration Date 10/01/20   OT Frequency 1-2x/wk   Recommendation   OT Discharge Recommendation Return to previous environment with social support  (return home with previous assist from family)   Barthel Index   Feeding 5   Bathing 0   Grooming Score 0   Dressing Score 5   Bladder Score 5   Bowels Score 5   Toilet Use Score 5   Transfers (Bed/Chair) Score 10   Mobility (Level Surface) Score 0   Stairs Score 0   Barthel Index Score 35       Dee Dee Dec, OTR/L

## 2020-09-17 NOTE — ASSESSMENT & PLAN NOTE
Ambulatory dysfunction - Poor oral intake - Dehydration  Need more hydration-continue D5 NS at 75 cc/hour  Nutrition on board  Tracie Kinsey dysphagia 3/mechanical soft diet  Speech evaluate patient-continue mechanical soft diet    Maintain aspiration precautions  Continue megase

## 2020-09-17 NOTE — DISCHARGE SUMMARY
Discharge- Cierra Hernández 3/14/1924, 80 y o  female MRN: [id number]    Unit/Bed#: -01 Encounter: [id number]    Primary Care Provider: Yamileth Shen MD   Date and time admitted to hospital: 9/14/2020 11:24 AM        * Failure to thrive (0-17)  Assessment & Plan    Ambulatory dysfunction - Poor oral intake - Dehydration  Need more hydration-continue D5 NS at 75 cc/hour  Nutrition on board  Ella Laurany dysphagia 3/mechanical soft diet  Speech evaluate patient-continue mechanical soft diet  Maintain aspiration precautions  Continue megase      Dementia without behavioral disturbance ([facility] [address] )  Assessment & Plan  Stable/ much improved  At baseline      Severe protein-calorie malnutrition (HCC)  Assessment & Plan  Malnutrition Findings:      ·   Ensure Plus t i d                Nutrition on board    BMI Findings: Body mass index is 28 39 kg/m²  CKD (chronic kidney disease) stage 4, GFR 15-29 ml/min ([facility])  Assessment & Plan  chronic met metabolic acidosis-start bicarb tablets 600 b i d  stable-creatinine at the baseline    Gout  Assessment & Plan  · continue allopurinol-renally dosed                Resolved Problems  Date Reviewed: 9/16/2020    None          Admission Date:   Admission Orders (From admission, onward)     Ordered        09/14/20 1316  Inpatient Admission (expected length of stay for this patient Order details is greater than two midnights)  Once                     Admitting Diagnosis: Dehydration [E86 0]    HPI:   Cierra Hernández is a 80 y o  female with past medical history of hypertension, gout, protein calorie malnutrition who presents with chief complaint of generalized weakness      Patient was brought to the hospital by her daughter who reported that patient not drinking enough water recently, feeling very weak compared to her baseline  Patient usually ambulate using a walker infrequently  Patient was [facility] for the same    Spent 5 days in the hospital   Was discharged on [facility] for poor appetite  Patient has chronic dysphagia over the upper endoscopy refused by the family given her age        Denies any nausea, vomiting, abdominal pain, diarrhea, fever, chills, chest pain, palpitation, shortness of breath, blurry vision, focal tingling numbness       Upon presentation to the hospital patient vital sign was significant for blood pressure of 188/69  Apparently patient did not have her morning medication  BMP significant for creatinine 1 4 at baseline is same  Normal liver enzyme  Negative troponin  Normal lactic acid  Patient EKG shows first-degree AV block, left anterior fascicular block  Patient was given 1 L of NS in ER  During the hospital stay patient was given intravenous hydration, nutrition consult was done, patient's diet was changed as per nutritionist recommendation, PT OT was on board-patient was ambulated and walked around as per her tolerance  Patient and patient's family was was offered short-term rehab for patient's placement but family denied    Procedures Performed: No orders of the defined types were placed in this encounter  Significant Findings, Care, Treatment and Services Provided: none    Complications: none    Condition at Discharge: stable         Discharge instructions/Information to patient and family:   See after visit summary for information provided to patient and family  Provisions for Follow-Up Care:  See after visit summary for information related to follow-up care and any pertinent home health orders  PCP: Marie Hart MD    Disposition: Home    Planned Readmission: No    Discharge Statement   I spent 35 minutes discharging the patient  This time was spent on the day of discharge  I had direct contact with the patient on the day of discharge  Additional documentation is required if more than 30 minutes were spent on discharge       Discharge Medications:  See after visit summary for reconciled discharge medications provided to patient and family

## 2020-09-17 NOTE — CASE MANAGEMENT
LOS: 3 GMLOS: 3    YOLANDA had a conversation with patient's daughter Marcell Hays [phone] over the phone, SW informed the Dtr that patient is doing well, she is walking and is ready to go home according to doctors orders  YOLANDA discussed IMM with the dtr  Dtr gave verbal consent to understanding and for SW to sign on dtr's behalf  Dtr agreeing with discharge determination  Yolanda informed dtr that [facility] visiting nurse have been arranged for in home care  Contact info written on AVS  Dtr requested for medicine Stefan Fine and nurse notified  Dtr noted she will transport patient home after she gets out of work this evening  No other needs identified at this time  Nursing aware of above

## 2020-09-17 NOTE — PHYSICAL THERAPY NOTE
PHYSICAL THERAPY TREATMENT    NAME:  Cierra Hernández  DATE: 09/17/20    AGE:   80 y o  Mrn:   [id number]  Length Of Stay: 3    ADMIT DX:  Dehydration [E86 0]    Past Medical History:   Diagnosis Date    Arthritis     reynaulds arthritis    Cancer ([facility] [address] )     Hyperlipidemia     Hypertension     Raynaud's disease     Renal disorder     pt states she had kidney disorders long ago, but cant recall the doctor or the problem     Past Surgical History:   Procedure Laterality Date    BREAST LUMPECTOMY  01/01/1970    lumpectomy    BREAST RECONSTRUCTION  01/01/1980    Bilaterally    CHOLECYSTECTOMY      COLONOSCOPY N/A 4/3/2017    Procedure: COLONOSCOPY;  Surgeon: Rolan Cline MD;  [facility]; Service:     ELBOW SURGERY Right     ESOPHAGOGASTRODUODENOSCOPY N/A 4/3/2017    Procedure: ESOPHAGOGASTRODUODENOSCOPY (EGD); Surgeon: Rolan Cline MD;  [facility]; Service:     FEMUR SURGERY Right     FRACTURE SURGERY      prem to r femur    JOINT REPLACEMENT Right 01/01/2004    knee    JOINT REPLACEMENT Right     hip    OTHER SURGICAL HISTORY  09/2018    Squamous cells removed from nose    REPLACEMENT TOTAL KNEE Right        Performed at least 2 patient identifiers during session: Name and ID bracelet     09/17/20 1143   PT Last Visit   PT Visit Date 09/17/20   Pain Assessment   Pain Assessment Tool 0-10   Pain Score No Pain   Restrictions/Precautions   Weight Bearing Precautions Per Order No   Other Precautions Cognitive; Chair Alarm; Bed Alarm;Multiple lines; Fall Risk;Hard of hearing   General   Chart Reviewed Yes   Additional Pertinent History pt planned for tentative d/c today    Response to Previous Treatment Patient with no complaints from previous session     Family/Caregiver Present No   Cognition   Overall Cognitive Status Impaired   Arousal/Participation Responsive   Attention Difficulty attending to directions   Orientation Level Oriented to person   Memory Decreased recall of recent events;Decreased short term memory   Following Commands Follows one step commands inconsistently   Subjective   Subjective "You want me to sit here?"   Bed Mobility   Rolling R 2  Maximal assistance   Additional items Assist x 1   Rolling L 2  Maximal assistance   Additional items Assist x 1   Sit to Supine 5  Supervision   Additional items Impulsive   Transfers   Sit to Stand 4  Minimal assistance   Additional items Assist x 1; Increased time required; Other  (visual cues )   Stand to Sit 4  Minimal assistance   Additional items Assist x 1; Impulsive  (visual cues )   Ambulation/Elevation   Gait pattern Decreased foot clearance; Short stride; Shuffling  (slight ataxic gait due to tremors )   Gait Assistance 4  Minimal assist  (CGA)   Additional items Assist x 1   Assistive Device Rolling walker   Distance 16ft x1    Stair Management Assistance Not tested   Balance   Static Sitting Fair   Dynamic Sitting Fair -   Static Standing Poor +   Dynamic Standing Poor +   Ambulatory Poor +   Endurance Deficit   Endurance Deficit Yes   Activity Tolerance   Activity Tolerance Patient limited by fatigue; Other (Comment)  (limited due to hearing and cognitive impairments)   Nurse Made Aware Yes spoke with DALE chawla pre/post session   Assessment   Prognosis Fair   Problem List Decreased strength;Decreased endurance; Impaired balance;Decreased mobility; Decreased cognition; Impaired judgement;Decreased safety awareness;Decreased coordination;Decreased skin integrity; Impaired hearing   Assessment Pt seen for PT treatment today, after clearance by DALE Chawla  Pt presents seated in bedside recliner chair  All communication continues to require pen/paper due to pt's hearing impairments  Despite pen/paper commands, pt continues to have difficulty following commands and maintaining active engagement with therapist  Pt completes transfers with [address] [facility] and RW, and ambulates 16ft with RW and CGA/DAWSON   Pt with improved stability with gait as compared to previous sessions however pt impulsive to return to sit with uncontrolled descent to EOB, impulsively returns to semi-supine in bed, tangling herself in IV tubing  Pt was repositioned and left semi-supine in bed, all needs within reach and lines intact, +bed alarm engaged, care returned to RN  Continue skilled PT POC as able  Pt remains significantly limited due to hearing and cognitive impairments, as well as decreased strength/endurance and impaired balance  Continue to recommend HHPT and 24hrs care/supervision from family  Barriers to Discharge Inaccessible home environment  (pt has 4 BRIELLE )   Goals   Patient Goals none stated   STG Expiration Date 09/22/20   Short Term Goal #1 1  Pt will complete all bed mobility in flat bed at CHRIS level, in order to promote increased OOB functional mobility to improve overall activity tolerance  2  Pt will complete all transfers with RW and S, in order to increase safety with functional mobility  3  Pt will ambulate >50ft with RW and S in order to increase safety with household distance functional mobility  4  Pt will negotiate 4 stairs with HR and DAWSON in order to access the first floor of her home  5  Pt will improve B LE strength to >/= 4/5 MMT throughout, in order to increase safety with functional mobility and decrease risk of falls  6  Pt will improve Barthel Index score to >/= 50/100 in order to increase independence and decrease risk of falls  7  Pt will demonstrate understanding and independence with LE strengthening HEP  8  Pt will improve standing balance by >/= 1/2 grade in order to promote safety and increased independence with mobility  9  Pt will tolerate >3hrs OOB in chair with good endurance evidenced by no signif change in vitals or behaviors  PT Treatment Day 3   Plan   Treatment/Interventions Functional transfer training;LE strengthening/ROM; Elevations; Therapeutic exercise; Endurance training;Cognitive reorientation;Gait training; Bed mobility;Spoke to nursing;Spoke to case management;OT   Progress Slow progress, cognitive deficits   PT Frequency 5x/wk   Recommendation   PT Discharge Recommendation Home with skilled therapy  (24 hr care/supervision from family +HHPT)   Equipment Recommended Wheelchair;Walker       Time in: 1120  Time out: 1143  Total treatment time: 23 minutes     Jolynn Sharp, PT, DPT  9/17/2020

## 2020-09-17 NOTE — PLAN OF CARE
Problem: Potential for Falls  Goal: Patient will remain free of falls  Description: INTERVENTIONS:  - Assess patient frequently for physical needs  -  Identify cognitive and physical deficits and behaviors that affect risk of falls    -  [place] fall precautions as indicated by assessment   - Educate patient/family on patient safety including physical limitations  - Instruct patient to call for assistance with activity based on assessment  - Modify environment to reduce risk of injury  - Consider OT/PT consult to assist with strengthening/mobility  Outcome: Not Progressing

## 2020-09-17 NOTE — PLAN OF CARE
Problem: PHYSICAL THERAPY ADULT  Goal: Performs mobility at highest level of function for planned discharge setting  See evaluation for individualized goals  Description: Treatment/Interventions: Functional transfer training, LE strengthening/ROM, Elevations, Therapeutic exercise, Endurance training, Cognitive reorientation, Gait training, Bed mobility, Spoke to nursing, Spoke to case management, OT  Equipment Recommended: Wheelchair, Elfida Ayers       See flowsheet documentation for full assessment, interventions and recommendations  Outcome: Progressing  Note: Prognosis: Fair  Problem List: Decreased strength, Decreased endurance, Impaired balance, Decreased mobility, Decreased cognition, Impaired judgement, Decreased safety awareness, Decreased coordination, Decreased skin integrity, Impaired hearing  Assessment: Pt seen for PT treatment today, after clearance by DALE Olvera  Pt presents seated in bedside recliner chair  All communication continues to require pen/paper due to pt's hearing impairments  Despite pen/paper commands, pt continues to have difficulty following commands and maintaining active engagement with therapist  Pt completes transfers with [address] and RW, and ambulates 16ft with RW and CGA/DAWSON  Pt with improved stability with gait as compared to previous sessions however pt impulsive to return to sit with uncontrolled descent to EOB, impulsively returns to semi-supine in bed, tangling herself in IV tubing  Pt was repositioned and left semi-supine in bed, all needs within reach and lines intact, +bed alarm engaged, care returned to RN  Continue skilled PT POC as able  Pt remains significantly limited due to hearing and cognitive impairments, as well as decreased strength/endurance and impaired balance  Continue to recommend HHPT and 24hrs care/supervision from family     Barriers to Discharge: Inaccessible home environment(pt has 4 BRIELLE )     PT Discharge Recommendation: Home with skilled therapy(24 hr care/supervision from family +HHPT)          See flowsheet documentation for full assessment

## 2020-09-18 ENCOUNTER — TRANSITIONAL CARE MANAGEMENT (OUTPATIENT)
Dept: FAMILY MEDICINE CLINIC | Facility: CLINIC | Age: 85
End: 2020-09-18

## 2020-09-18 ENCOUNTER — TELEPHONE (OUTPATIENT)
Dept: FAMILY MEDICINE CLINIC | Facility: CLINIC | Age: 85
End: 2020-09-18

## 2020-09-18 LAB
ATRIAL RATE: 66 BPM
P AXIS: 45 DEGREES
PR INTERVAL: 222 MS
QRS AXIS: -55 DEGREES
QRSD INTERVAL: 93 MS
QT INTERVAL: 561 MS
QTC INTERVAL: 588 MS
T WAVE AXIS: -5 DEGREES
VENTRICULAR RATE: 66 BPM

## 2020-09-18 PROCEDURE — 93010 ELECTROCARDIOGRAM REPORT: CPT | Performed by: INTERNAL MEDICINE

## 2020-09-18 NOTE — CASE MANAGEMENT
Entry for 9/18/2020 at [place] [id number] notified by resident that dtr Castro Gomez called unit this morning requesting a hospice evaluation  They do not want to take pt back to the hospital as it is pt's wish to die at home  Resident placed hospice evaluation order   [facility] referral made to [facility] requesting they contact the dtr ASAP for an in-home eval

## 2020-09-21 ENCOUNTER — PATIENT OUTREACH (OUTPATIENT)
Dept: FAMILY MEDICINE CLINIC | Facility: CLINIC | Age: 85
End: 2020-09-21

## 2020-09-21 NOTE — PROGRESS NOTES
Referral received via [email or web address] message  Chart reviewed  Pt enrolled in [address] [place] [address] program 9/18/20

## 2020-09-22 NOTE — PROGRESS NOTES
Pt care managed by [facility]  Will close to outpatient care management at this time

## 2020-09-24 ENCOUNTER — TELEPHONE (OUTPATIENT)
Dept: FAMILY MEDICINE CLINIC | Facility: CLINIC | Age: 85
End: 2020-09-24

## 2020-09-28 ENCOUNTER — TELEPHONE (OUTPATIENT)
Dept: GERIATRICS | Age: 85
End: 2020-09-28